# Patient Record
Sex: MALE | Race: WHITE | NOT HISPANIC OR LATINO
[De-identification: names, ages, dates, MRNs, and addresses within clinical notes are randomized per-mention and may not be internally consistent; named-entity substitution may affect disease eponyms.]

---

## 2017-06-26 PROBLEM — Z00.00 ENCOUNTER FOR PREVENTIVE HEALTH EXAMINATION: Status: ACTIVE | Noted: 2017-06-26

## 2017-07-21 ENCOUNTER — APPOINTMENT (OUTPATIENT)
Dept: VASCULAR SURGERY | Facility: CLINIC | Age: 57
End: 2017-07-21

## 2017-10-03 ENCOUNTER — APPOINTMENT (OUTPATIENT)
Dept: VASCULAR SURGERY | Facility: CLINIC | Age: 57
End: 2017-10-03
Payer: MEDICAID

## 2017-10-03 VITALS
BODY MASS INDEX: 37.22 KG/M2 | SYSTOLIC BLOOD PRESSURE: 118 MMHG | WEIGHT: 290 LBS | DIASTOLIC BLOOD PRESSURE: 80 MMHG | HEIGHT: 74 IN

## 2017-10-03 DIAGNOSIS — Z78.9 OTHER SPECIFIED HEALTH STATUS: ICD-10-CM

## 2017-10-03 DIAGNOSIS — Z86.39 PERSONAL HISTORY OF OTHER ENDOCRINE, NUTRITIONAL AND METABOLIC DISEASE: ICD-10-CM

## 2017-10-03 DIAGNOSIS — Z86.79 PERSONAL HISTORY OF OTHER DISEASES OF THE CIRCULATORY SYSTEM: ICD-10-CM

## 2017-10-03 PROCEDURE — 99213 OFFICE O/P EST LOW 20 MIN: CPT

## 2017-10-03 PROCEDURE — 93978 VASCULAR STUDY: CPT

## 2018-02-13 ENCOUNTER — APPOINTMENT (OUTPATIENT)
Dept: VASCULAR SURGERY | Facility: CLINIC | Age: 58
End: 2018-02-13
Payer: MEDICAID

## 2018-02-13 ENCOUNTER — OUTPATIENT (OUTPATIENT)
Dept: OUTPATIENT SERVICES | Facility: HOSPITAL | Age: 58
LOS: 1 days | Discharge: HOME | End: 2018-02-13

## 2018-02-13 DIAGNOSIS — I71.4 ABDOMINAL AORTIC ANEURYSM, WITHOUT RUPTURE: ICD-10-CM

## 2018-02-13 PROCEDURE — 93978 VASCULAR STUDY: CPT

## 2018-02-13 PROCEDURE — 99213 OFFICE O/P EST LOW 20 MIN: CPT

## 2018-02-20 ENCOUNTER — OUTPATIENT (OUTPATIENT)
Dept: OUTPATIENT SERVICES | Facility: HOSPITAL | Age: 58
LOS: 1 days | Discharge: HOME | End: 2018-02-20

## 2018-02-20 DIAGNOSIS — I71.4 ABDOMINAL AORTIC ANEURYSM, WITHOUT RUPTURE: ICD-10-CM

## 2018-02-23 ENCOUNTER — APPOINTMENT (OUTPATIENT)
Dept: VASCULAR SURGERY | Facility: CLINIC | Age: 58
End: 2018-02-23
Payer: MEDICAID

## 2018-02-23 PROCEDURE — 99212 OFFICE O/P EST SF 10 MIN: CPT

## 2018-04-03 ENCOUNTER — APPOINTMENT (OUTPATIENT)
Dept: VASCULAR SURGERY | Facility: CLINIC | Age: 58
End: 2018-04-03

## 2018-08-23 ENCOUNTER — OTHER (OUTPATIENT)
Age: 58
End: 2018-08-23

## 2018-08-24 ENCOUNTER — APPOINTMENT (OUTPATIENT)
Dept: VASCULAR SURGERY | Facility: CLINIC | Age: 58
End: 2018-08-24
Payer: MEDICAID

## 2018-08-24 ENCOUNTER — APPOINTMENT (OUTPATIENT)
Dept: VASCULAR SURGERY | Facility: CLINIC | Age: 58
End: 2018-08-24

## 2018-08-31 ENCOUNTER — APPOINTMENT (OUTPATIENT)
Dept: VASCULAR SURGERY | Facility: CLINIC | Age: 58
End: 2018-08-31
Payer: MEDICAID

## 2018-08-31 PROCEDURE — 99213 OFFICE O/P EST LOW 20 MIN: CPT

## 2018-08-31 PROCEDURE — 93978 VASCULAR STUDY: CPT

## 2018-11-01 ENCOUNTER — OUTPATIENT (OUTPATIENT)
Dept: OUTPATIENT SERVICES | Facility: HOSPITAL | Age: 58
LOS: 1 days | End: 2018-11-01
Payer: MEDICAID

## 2018-11-25 ENCOUNTER — INPATIENT (INPATIENT)
Facility: HOSPITAL | Age: 58
LOS: 1 days | Discharge: HOME | End: 2018-11-27
Attending: INTERNAL MEDICINE | Admitting: INTERNAL MEDICINE

## 2018-11-25 VITALS
HEART RATE: 83 BPM | RESPIRATION RATE: 18 BRPM | SYSTOLIC BLOOD PRESSURE: 193 MMHG | OXYGEN SATURATION: 96 % | TEMPERATURE: 99 F | DIASTOLIC BLOOD PRESSURE: 100 MMHG

## 2018-11-25 LAB
ALBUMIN SERPL ELPH-MCNC: 4 G/DL — SIGNIFICANT CHANGE UP (ref 3.5–5.2)
ALP SERPL-CCNC: 105 U/L — SIGNIFICANT CHANGE UP (ref 30–115)
ALT FLD-CCNC: 30 U/L — SIGNIFICANT CHANGE UP (ref 0–41)
ANION GAP SERPL CALC-SCNC: 16 MMOL/L — HIGH (ref 7–14)
APTT BLD: 33 SEC — SIGNIFICANT CHANGE UP (ref 27–39.2)
AST SERPL-CCNC: 22 U/L — SIGNIFICANT CHANGE UP (ref 0–41)
BASOPHILS # BLD AUTO: 0.07 K/UL — SIGNIFICANT CHANGE UP (ref 0–0.2)
BASOPHILS NFR BLD AUTO: 0.7 % — SIGNIFICANT CHANGE UP (ref 0–1)
BILIRUB SERPL-MCNC: 0.3 MG/DL — SIGNIFICANT CHANGE UP (ref 0.2–1.2)
BUN SERPL-MCNC: 21 MG/DL — HIGH (ref 10–20)
CALCIUM SERPL-MCNC: 10 MG/DL — SIGNIFICANT CHANGE UP (ref 8.5–10.1)
CHLORIDE SERPL-SCNC: 98 MMOL/L — SIGNIFICANT CHANGE UP (ref 98–110)
CO2 SERPL-SCNC: 23 MMOL/L — SIGNIFICANT CHANGE UP (ref 17–32)
CREAT SERPL-MCNC: 1.2 MG/DL — SIGNIFICANT CHANGE UP (ref 0.7–1.5)
EOSINOPHIL # BLD AUTO: 0.31 K/UL — SIGNIFICANT CHANGE UP (ref 0–0.7)
EOSINOPHIL NFR BLD AUTO: 3.3 % — SIGNIFICANT CHANGE UP (ref 0–8)
GLUCOSE BLDC GLUCOMTR-MCNC: 166 MG/DL — HIGH (ref 70–99)
GLUCOSE SERPL-MCNC: 266 MG/DL — HIGH (ref 70–99)
HCT VFR BLD CALC: 46.8 % — SIGNIFICANT CHANGE UP (ref 42–52)
HGB BLD-MCNC: 16.1 G/DL — SIGNIFICANT CHANGE UP (ref 14–18)
IMM GRANULOCYTES NFR BLD AUTO: 0.4 % — HIGH (ref 0.1–0.3)
INR BLD: 0.97 RATIO — SIGNIFICANT CHANGE UP (ref 0.65–1.3)
LYMPHOCYTES # BLD AUTO: 2.49 K/UL — SIGNIFICANT CHANGE UP (ref 1.2–3.4)
LYMPHOCYTES # BLD AUTO: 26.2 % — SIGNIFICANT CHANGE UP (ref 20.5–51.1)
MCHC RBC-ENTMCNC: 30.3 PG — SIGNIFICANT CHANGE UP (ref 27–31)
MCHC RBC-ENTMCNC: 34.4 G/DL — SIGNIFICANT CHANGE UP (ref 32–37)
MCV RBC AUTO: 88.1 FL — SIGNIFICANT CHANGE UP (ref 80–94)
MONOCYTES # BLD AUTO: 0.7 K/UL — HIGH (ref 0.1–0.6)
MONOCYTES NFR BLD AUTO: 7.4 % — SIGNIFICANT CHANGE UP (ref 1.7–9.3)
NEUTROPHILS # BLD AUTO: 5.91 K/UL — SIGNIFICANT CHANGE UP (ref 1.4–6.5)
NEUTROPHILS NFR BLD AUTO: 62 % — SIGNIFICANT CHANGE UP (ref 42.2–75.2)
NRBC # BLD: 0 /100 WBCS — SIGNIFICANT CHANGE UP (ref 0–0)
PLATELET # BLD AUTO: 293 K/UL — SIGNIFICANT CHANGE UP (ref 130–400)
POTASSIUM SERPL-MCNC: 4.7 MMOL/L — SIGNIFICANT CHANGE UP (ref 3.5–5)
POTASSIUM SERPL-SCNC: 4.7 MMOL/L — SIGNIFICANT CHANGE UP (ref 3.5–5)
PROT SERPL-MCNC: 7.3 G/DL — SIGNIFICANT CHANGE UP (ref 6–8)
PROTHROM AB SERPL-ACNC: 11.2 SEC — SIGNIFICANT CHANGE UP (ref 9.95–12.87)
RBC # BLD: 5.31 M/UL — SIGNIFICANT CHANGE UP (ref 4.7–6.1)
RBC # FLD: 12 % — SIGNIFICANT CHANGE UP (ref 11.5–14.5)
SODIUM SERPL-SCNC: 137 MMOL/L — SIGNIFICANT CHANGE UP (ref 135–146)
TROPONIN T SERPL-MCNC: <0.01 NG/ML — SIGNIFICANT CHANGE UP
WBC # BLD: 9.52 K/UL — SIGNIFICANT CHANGE UP (ref 4.8–10.8)
WBC # FLD AUTO: 9.52 K/UL — SIGNIFICANT CHANGE UP (ref 4.8–10.8)

## 2018-11-25 RX ORDER — PANTOPRAZOLE SODIUM 20 MG/1
40 TABLET, DELAYED RELEASE ORAL
Qty: 0 | Refills: 0 | Status: DISCONTINUED | OUTPATIENT
Start: 2018-11-25 | End: 2018-11-27

## 2018-11-25 RX ORDER — SODIUM CHLORIDE 9 MG/ML
1000 INJECTION, SOLUTION INTRAVENOUS ONCE
Qty: 0 | Refills: 0 | Status: COMPLETED | OUTPATIENT
Start: 2018-11-25 | End: 2018-11-25

## 2018-11-25 RX ORDER — ATORVASTATIN CALCIUM 80 MG/1
80 TABLET, FILM COATED ORAL AT BEDTIME
Qty: 0 | Refills: 0 | Status: DISCONTINUED | OUTPATIENT
Start: 2018-11-25 | End: 2018-11-27

## 2018-11-25 RX ORDER — ASPIRIN/CALCIUM CARB/MAGNESIUM 324 MG
325 TABLET ORAL DAILY
Qty: 0 | Refills: 0 | Status: DISCONTINUED | OUTPATIENT
Start: 2018-11-25 | End: 2018-11-27

## 2018-11-25 RX ORDER — CLOPIDOGREL BISULFATE 75 MG/1
75 TABLET, FILM COATED ORAL DAILY
Qty: 0 | Refills: 0 | Status: DISCONTINUED | OUTPATIENT
Start: 2018-11-25 | End: 2018-11-27

## 2018-11-25 RX ORDER — ENOXAPARIN SODIUM 100 MG/ML
40 INJECTION SUBCUTANEOUS DAILY
Qty: 0 | Refills: 0 | Status: DISCONTINUED | OUTPATIENT
Start: 2018-11-25 | End: 2018-11-27

## 2018-11-25 RX ORDER — SODIUM CHLORIDE 9 MG/ML
1000 INJECTION INTRAMUSCULAR; INTRAVENOUS; SUBCUTANEOUS ONCE
Qty: 0 | Refills: 0 | Status: COMPLETED | OUTPATIENT
Start: 2018-11-25 | End: 2018-11-25

## 2018-11-25 RX ORDER — CLOPIDOGREL BISULFATE 75 MG/1
300 TABLET, FILM COATED ORAL ONCE
Qty: 0 | Refills: 0 | Status: COMPLETED | OUTPATIENT
Start: 2018-11-25 | End: 2018-11-25

## 2018-11-25 RX ORDER — LEVOTHYROXINE SODIUM 125 MCG
125 TABLET ORAL DAILY
Qty: 0 | Refills: 0 | Status: DISCONTINUED | OUTPATIENT
Start: 2018-11-25 | End: 2018-11-27

## 2018-11-25 RX ORDER — DILTIAZEM HCL 120 MG
360 CAPSULE, EXT RELEASE 24 HR ORAL DAILY
Qty: 0 | Refills: 0 | Status: DISCONTINUED | OUTPATIENT
Start: 2018-11-25 | End: 2018-11-27

## 2018-11-25 RX ORDER — ASPIRIN/CALCIUM CARB/MAGNESIUM 324 MG
325 TABLET ORAL ONCE
Qty: 0 | Refills: 0 | Status: COMPLETED | OUTPATIENT
Start: 2018-11-25 | End: 2018-11-25

## 2018-11-25 RX ORDER — LISINOPRIL 2.5 MG/1
40 TABLET ORAL DAILY
Qty: 0 | Refills: 0 | Status: DISCONTINUED | OUTPATIENT
Start: 2018-11-25 | End: 2018-11-27

## 2018-11-25 RX ADMIN — CLOPIDOGREL BISULFATE 300 MILLIGRAM(S): 75 TABLET, FILM COATED ORAL at 19:52

## 2018-11-25 RX ADMIN — SODIUM CHLORIDE 500 MILLILITER(S): 9 INJECTION, SOLUTION INTRAVENOUS at 20:00

## 2018-11-25 RX ADMIN — ENOXAPARIN SODIUM 40 MILLIGRAM(S): 100 INJECTION SUBCUTANEOUS at 23:00

## 2018-11-25 RX ADMIN — SODIUM CHLORIDE 2000 MILLILITER(S): 9 INJECTION INTRAMUSCULAR; INTRAVENOUS; SUBCUTANEOUS at 14:18

## 2018-11-25 RX ADMIN — SODIUM CHLORIDE 1000 MILLILITER(S): 9 INJECTION INTRAMUSCULAR; INTRAVENOUS; SUBCUTANEOUS at 15:18

## 2018-11-25 RX ADMIN — SODIUM CHLORIDE 1000 MILLILITER(S): 9 INJECTION, SOLUTION INTRAVENOUS at 20:44

## 2018-11-25 RX ADMIN — Medication 162 MILLIGRAM(S): at 20:00

## 2018-11-25 RX ADMIN — ATORVASTATIN CALCIUM 80 MILLIGRAM(S): 80 TABLET, FILM COATED ORAL at 20:44

## 2018-11-25 NOTE — CONSULT NOTE ADULT - SUBJECTIVE AND OBJECTIVE BOX
CC: Mid basilar artery stenosis and TIA    HPI:  59 y/o right handed male with PMHX of DM II, HTN, hyperlipidemia, abdominal aortic aneurysm last measuring 4.7 2 months ago, RA not on any meds, hypothyroidism presents to ED with 1 week of several transient episodes of right sided weakness and slurred speech. Patient states that he is otherwise a health and active and as of 1 week ago he woke up from his sleep experiencing burning sensation under his eyes then he stood up to go to the bathroom but his right leg was weak and he was having difficulty walking to the bathroom. His symptoms lasted for approx. 5 minute duration with complete resolution. In the following days he had multiple episodes of right sided weakness , slurred speech and blurry vision with varying intensity   reports symptoms first began ~ a week ago, he develops a burning under his eyes, right sided facial numbness, right UE & LE complete paralysis, and slurred speech. This has happened 3 times over the course of the week, lasting one a few minutes. The third time occurred last night, he states symptoms lasted less than 5 minutes while he was sleeping and got up from sleep. He did not come to the hospital as there was no one to take care of his kids and the  came today.     Denies fever, chills, cough, abdominal pain, n/v/d, chest pain, numbness, tingling. Denies blurry vision or visual changes. Pt does not have symptoms at this time while present in the ED and NIHSS scale is 0. Patient's BP was 196/100 HR 90s, was given 1L NS and 1L RL. Patient was also given Plavix 300mg,  with Lipitor 80. (25 Nov 2018 19:40)            Neuro Exam:  Orientation: oriented to person, oriented to place and oriented to time.   Attention: normal concentrating ability and visual attention was not decreased.   Language: no difficulty naming common objects, no difficulty repeating a phrase, no difficulty writing a sentence, fluency intact, comprehension intact and reading intact.   Fund of knowledge: displays adequate knowledge of personal past history.   Cranial Nerves: visual acuity intact bilaterally, visual fields full to confrontation, pupils equal round and reactive to light, extraocular motion intact, facial sensation intact symmetrically, face symmetrical, hearing was intact bilaterally, tongue and palate midline, head turning and shoulder shrug symmetric and there was no tongue deviation with protrusion.   Motor: muscle tone was normal in all four extremities, muscle strength was normal in all four extremities and normal bulk in all four extremities.   Sensory exam: light touch was intact.   Coordination:. normal gait. balance was intact. there was no past-pointing. no tremor present.   Deep tendon reflexes:   Biceps right 2+. Biceps left 2+.    Triceps right 2+. Triceps left 2+.  LOC  Brachioradialis right 2+. Brachioradialis left 2+.    Patella right 2+. Patella left 2+.    Ankle jerk right 2+. Ankle jerk left 2+.   Plantar responses normal on the right, normal on the left.      NIHSS:     Allergies    penicillin (Unknown)    Intolerances      MEDICATIONS  (STANDING):  aspirin  chewable 325 milliGRAM(s) Oral daily  atorvastatin 80 milliGRAM(s) Oral at bedtime  atorvastatin 80 milliGRAM(s) Oral at bedtime  clopidogrel Tablet 75 milliGRAM(s) Oral daily  diltiazem    milliGRAM(s) Oral daily  enoxaparin Injectable 40 milliGRAM(s) SubCutaneous daily  levothyroxine 125 MICROGram(s) Oral daily  lisinopril 40 milliGRAM(s) Oral daily  pantoprazole    Tablet 40 milliGRAM(s) Oral before breakfast    MEDICATIONS  (PRN):      LABS:                        16.1   9.52  )-----------( 293      ( 25 Nov 2018 13:35 )             46.8     11-25    137  |  98  |  21<H>  ----------------------------<  266<H>  4.7   |  23  |  1.2    Ca    10.0      25 Nov 2018 13:35    TPro  7.3  /  Alb  4.0  /  TBili  0.3  /  DBili  x   /  AST  22  /  ALT  30  /  AlkPhos  105  11-25    PT/INR - ( 25 Nov 2018 13:35 )   PT: 11.20 sec;   INR: 0.97 ratio         PTT - ( 25 Nov 2018 13:35 )  PTT:33.0 sec        Neuro Imaging:    EEG:     Echo:   Carotid Doppler: N/A  Telemetry: CC: Mid basilar artery stenosis and TIA    HPI:  57 y/o right handed male with PMHX of DM II, HTN, hyperlipidemia, abdominal aortic aneurysm last measuring 4.7 2 months ago, RA not on any meds, hypothyroidism presents to ED with 1 week of several transient episodes of right sided weakness and slurred speech. Patient states that he is otherwise a health and active and as of 1 week ago he woke up from his sleep experiencing burning sensation under his eyes then he stood up to go to the bathroom but his right leg was weak and he was having difficulty walking to the bathroom. His symptoms lasted for approx. 5 minute duration with complete resolution. In the following days he had multiple episodes of right sided weakness , slurred speech and blurry vision with varying intensity and duration worst being last night.  He did not come to the hospital as he is a single parent and there was no one to take care of his kids and the  came today. Denies fever, chills, cough, abdominal pain, n/v/d, chest pain, numbness, tingling. Pt does not have symptoms at this time in the ED and NIHSS scale is 0. Patient's BP was 196/100 HR 90s. Patient was also given Plavix 300mg,  with Lipitor 80.       Home Medications:  aspirin 81 mg oral tablet, chewable: 1 tab(s) orally once a day   dilTIAZem 360 mg/24 hours oral tablet, extended release: 1 tab(s) orally once a day  levothyroxine 125 mcg (0.125 mg) oral capsule: 1 cap(s) orally once a day   metFORMIN 500 mg oral tablet: 1 tab(s) orally 3 times a day   ramipril 10 mg oral capsule: 1 cap(s) orally once a day     Social history  one and a half ppd x 30 +yrs   denies alcohol and drug use    Family history  Denies significant family history in 1st degree relatives    Neuro Exam:  Orientation: oriented to person, place time and situation, speech fluent , normal naming and repeats, normal comprehension, able to give history of events.  Cranial Nerves: EOMI, VFF, Speech and language intact without dysarthria or aphasia, face is symmetric, facial sensation is intact, tongue protrudes midline , normal shoulder shrug  Motor:  Normal muscle bulk and tone              5/5 throughout / no drift              No abnormal involuntary mob\vements  Sensory exam: lntact and symmetric  Coordination:. no dysmetria or limb ataxia , ROM intact   gait: deferred     NIHSS: 0    Allergies    penicillin (Unknown)    Intolerances      MEDICATIONS  (STANDING):  aspirin  chewable 325 milliGRAM(s) Oral daily  atorvastatin 80 milliGRAM(s) Oral at bedtime  atorvastatin 80 milliGRAM(s) Oral at bedtime  clopidogrel Tablet 75 milliGRAM(s) Oral daily  diltiazem    milliGRAM(s) Oral daily  enoxaparin Injectable 40 milliGRAM(s) SubCutaneous daily  levothyroxine 125 MICROGram(s) Oral daily  lisinopril 40 milliGRAM(s) Oral daily  pantoprazole    Tablet 40 milliGRAM(s) Oral before breakfast    MEDICATIONS  (PRN):      LABS:                        16.1   9.52  )-----------( 293      ( 25 Nov 2018 13:35 )             46.8     11-25    137  |  98  |  21<H>  ----------------------------<  266<H>  4.7   |  23  |  1.2    Ca    10.0      25 Nov 2018 13:35    TPro  7.3  /  Alb  4.0  /  TBili  0.3  /  DBili  x   /  AST  22  /  ALT  30  /  AlkPhos  105  11-25    PT/INR - ( 25 Nov 2018 13:35 )   PT: 11.20 sec;   INR: 0.97 ratio         PTT - ( 25 Nov 2018 13:35 )  PTT:33.0 sec        Neuro Imaging:  < from: CT Head No Cont (11.25.18 @ 17:00) >  NTERPRETATION:  Clinical History / Reason for exam: Slurred speech  The study was performed without IV contrast.  No previous exam.  The cisterns and ventricles appear normal with no shift of midline   structures.  No hemorrhage, infarct or mass formation is seen..  There is several low density areas in the right left central white matter   probably representing chronic ischemic disease.  The skull is intact.  Impression  No acute process seen.    < end of copied text >      < from: CT Angio Neck w/ IV Cont (11.25.18 @ 17:00) >  FINDINGS:    CTA neck:   The visualized aortic arch and great vessel origins demonstrate small   amount of calcific plaque without significant stenosis.    The right common, internal and external carotid arteries are patent.    The left common carotid artery is patent. There is atheromatous plaque at   the left carotid bifurcation without significant stenosis.    The vertebral arteries are patent bilaterally, dominant on the left.    CTA head:   There is calcific plaque at the carotid siphons with mild stenosis on the   left. There is contour irregularity of the left cavernous ICA likely due   to atherosclerosis.    The anterior and middle cerebral arteries are patent.    The nondominant right vertebral artery terminates as the PICA, normal   variant. The left vertebral artery is patent.    Basilar artery demonstrates a short segment critical (greater than 90%)   stenosis on the basis of noncalcified filling defect. There is   desiccation of the distal basilar artery and posterior cerebral arteries.   There is a prominent right posterior communicating artery. The left   posterior communicating artery is not visualized.    The posterior cerebral arteries are patent with mild stenoses bilaterally.    Other:   Mild scattered paranasal sinus mucosal disease.  Moderate cervical spine degenerative changes. Partial fusion across C2-3   there is likely congenital.  Emphysematous changes at the lung apices.     < end of copied text >    EEG:     Echo:   Carotid Doppler: N/A  Telemetry:

## 2018-11-25 NOTE — CONSULT NOTE ADULT - ASSESSMENT
57 y/o right handed male with PMHX of DM II, HTN, hyperlipidemia, abdominal aortic aneurysm last measuring 4.7 2 months ago, RA not on any meds, hypothyroidism presents to ED with 1 week of several transient episodes of right sided weakness , slurred speech and blurry vision , he had multiple episodes with varying intensity and duration worst being last night. Noted to have Basilar artery  short segment critical (greater than 90%) stenosis on the basis of noncalcified filling defect. Case was discussed with neuro interventionalist Dr Nilda Koenig , who recommends no acute neuro intervention at this time. Case was also discussed with neuro attending on call Dr Lani Espinoza. Patient was also given Plavix 300mg,  and  Lipitor 80 while in ED                       Basilar artery stenosis (>90%)    Plan  Admit to ICU  Continuos cardiac monitoring  Neuro checks q 1 hrs with nihss  keep sbp between 130-160  hydration  Bedrest with Hob no greater than 30 degrees  start  mg q daily  start Plavix 75mg q daily  start Lipitor 80mg q daily  check lipid profile and hbaic  For acute onset symptoms please call a stroke code   Neuro attending note will follow 57 y/o right handed male with PMHX of DM II, HTN, hyperlipidemia, abdominal aortic aneurysm last measuring 4.7 2 months ago, RA not on any meds, hypothyroidism presents to ED with 1 week of several transient episodes of right sided weakness , slurred speech and blurry vision , he had multiple episodes with varying intensity and duration worst being last night. Noted to have Basilar artery  short segment critical (greater than 90%) stenosis on the basis of noncalcified filling defect. Case was discussed with neuro interventionalist Dr Nilda Koenig , who recommends no acute neuro intervention at this time. Case was also discussed with neuro attending on call Dr Lani Espinoza. Patient was also given Plavix 300mg,  and  Lipitor 80 while in ED                       Basilar artery stenosis (>90%)    Plan  Admit to ICU  Continuos cardiac monitoring  Neuro checks q 1 hrs with nihss  keep sbp between 130-160  Avoid hypotension, give saline boluses to maintain bp goal   hydration  Bedrest with Hob no greater than 30 degrees  start  mg q daily  start Plavix 75mg q daily  start Lipitor 80mg q daily  check lipid profile and hbaic  For acute onset symptoms please call a stroke code   Neuro attending note will follow 59 y/o right handed male with PMHX of DM II, HTN, hyperlipidemia, abdominal aortic aneurysm last measuring 4.7 2 months ago, RA not on any meds, hypothyroidism presents to ED with 1 week of several transient episodes of right sided weakness , slurred speech and blurry vision , he had multiple episodes with varying intensity and duration worst being last night. Noted to have Basilar artery  short segment critical (greater than 90%) stenosis on the basis of noncalcified filling defect. Case was discussed with neuro interventionalist Dr Nilda Koenig , who recommends no acute neuro intervention at this time. Case was also discussed with neuro attending on call Dr Lani Espinoza. Patient was also given Plavix 300mg,  and  Lipitor 80 while in ED                       Basilar artery stenosis (>90%)    Plan  Admit to ICU  Continuos cardiac monitoring  Neuro checks q 1 hrs with nihss  keep sbp between 130-160  Avoid hypotension, give saline boluses to maintain bp goal   hydration  Bedrest with Hob no greater than 30 degrees  start  mg q daily  start Plavix 75mg q daily  start Lipitor 80mg q daily  check lipid profile and hbaic  N/c mri brain when stable  For acute onset symptoms please call a stroke code   Neuro attending note will follow 59 y/o right handed male with PMHX of DM II, HTN, hyperlipidemia, abdominal aortic aneurysm last measuring 4.7 2 months ago, RA not on any meds, hypothyroidism presents to ED with 1 week of several transient episodes of right sided weakness , slurred speech and blurry vision , he had multiple episodes with varying intensity and duration worst being last night. Noted to have Basilar artery  short segment critical (greater than 90%) stenosis on the basis of noncalcified filling defect. Case was discussed with neuro interventionalist Dr Nilda Koenig , who recommends no acute neuro intervention at this time will do medical management with frequent neuro checks and ICU monitoring  for now. Case was also discussed with neuro attending on call Dr Lani Espinoza. Patient was also given Plavix 300mg,  and  Lipitor 80 while in ED                       Basilar artery stenosis (>90%)    Plan  Admit to ICU  Continuos cardiac monitoring  Neuro checks q 1 hrs with nihss  keep sbp between 130-160  Avoid hypotension, give saline boluses to maintain bp goal   hydration  Bedrest with Hob no greater than 30 degrees  start  mg q daily  start Plavix 75mg q daily  start Lipitor 80mg q daily  check lipid profile and hbaic  N/c mri brain when stable  For acute onset symptoms please call a stroke code   Neuro attending note will follow

## 2018-11-25 NOTE — CONSULT NOTE ADULT - ATTENDING COMMENTS
Patient seen and examined this AM and history reviewed with patient.  Additionally overnight patient had transient episode of diplopia lasting <30 seconds.  Currently asymptomatic     Exam  A+OX3, language and attention normal  CN 2-12 Normal  power 5/5 with no drift  Sensory symmetric to PP, Temp, Vib with no neglect  FTN NL  Gait normal    NIHSS 0  mRankin 0 (baseline and current)    A/P  Patient with multiple TIA's over last 1-2 weeks and new finding of mid basilar severe stenosis.  Patient recently quit smoking cigarettes when symptoms began.  Also increased his aspirin form one baby aspirin to 2 baby aspirins a day.  Will need risk factor modification not an interventional candidate  1. ASA and plavix 75mg QD  2. High intensity statin therapy  3. Diet and weight modification and reduction  4. Check Hgba1c  5. BP control 130-150 goal (permissive hypertension)  6. Neurochecks q4 hours  7. 2DECHO  8. Platelet function assay for aspirin (can be as an out patient if not available as in patient)

## 2018-11-25 NOTE — H&P ADULT - PMH
Aortic aneurysm    Diabetes    HTN (hypertension)    Rheumatoid arthritis    Skull fracture    Thyroid disease

## 2018-11-25 NOTE — ED PROVIDER NOTE - OBJECTIVE STATEMENT
Pt is a 59 y/o Male, PMHX of DM, htn, hyperlipidemia, abdominal aortic aneurysm last measuring 4.7 2 months ago, presents to ED with slurred speech & weakness. Pt reports symptoms first began ~ a week ago, he develops a burning under his eyes, right sided facial numbness, right UE & LE weakness, and slurred speech. This has happened 3 times over the course of the week, lasting one a few minutes. The third time occurred last night, he states symptoms lasted less than 10 minutes. He does reports + family hx of strokes. Pt states he was > 1 pack per day smoker, but quit last week at start of symptoms and has not had cigarette since. Denies fever, chills, cough, abdominal pain, n/v/d, chest pain, numbness, tingling. Denies blurry vision or visual changes. Pt does not have symptoms at this time while present in the ED.

## 2018-11-25 NOTE — ED PROVIDER NOTE - PRINCIPAL DIAGNOSIS
----- Message from Penny Rahman sent at 6/5/2017  8:18 AM CDT -----  Contact: pt  Pt need a refill on his Crestor 20 mg and Coreg 12.5 mg and he would like a call once this is done because he is out.  LOV 6/21/16 Dr. Gordon    Thanks    Basilar artery stenosis

## 2018-11-25 NOTE — ED PROVIDER NOTE - CARE PLAN
This is a 26year old       who presents today for her 6 week postpartum exam.  She gave birth to a 7 lb 15 oz male named Louis Earing on 2017. Her pregnancy was uncomplicated  She had . This birth was uncomplicated. She is currently bottlefeeding . Her bleeding stopped after 3 weeks then started again last week. On 7/3 she called office reporting saturating pad per hour and passing large clots. She was advised by triage to have CBC checked but she did not have blood work done. She states bleeding is now just brown spotting. She has been afebrile. She has not experienced symptoms of postpartum depression. She has had intercourse since delivery. She plans to use oral contraceptive pill for birth control. Polyarthritis                                                 Anemia                                                        JRA (juvenile rheumatoid arthritis) (CMS/[facility])                   Comment: Per patient    Excessive anger                                                 Comment: Has many physical alterations stemming from her               anger. Uterine fibroid                                               Generalized anxiety disorder                                  History of chickenpox                                         Depression during pregnancy                                     Comment: zoloft in last pregnancy    Genital herpes                                                [place]  Depression Scale  Please check the answer that best describes how you have felt over the past 7 days. 1. Have you been able to laugh and see the funny side of things: (0)  As much as I always could  2. I have looked with enjoyment to things: (0)  As much as I ever did  3. I have blamed myself unnecessarily when things went wrong: (2)  Yes, sometimes  4. I have been anxious or worried for no good reason: (2)  Yes, sometimes  5.  I have felt scared or panicky for no good reason: "(0)  No, not at all  6. Things have been getting on top of me (overwhelming): (0)  No, I have been coping as well as ever  7. I have been so unhappy that I have difficulty sleeping: (1)  Sometimes  8. I have felt sad or miserable: (1)  Hardly ever  9. I have been so unhappy that I have been crying: (0)  No, not at all  10. The thought of harming myself has occurred to me: (0)  No, not at all    Total: 6  Any score>10 needs follow up for possible postpartum depression    PHYSICAL EXAM:  Visit Vitals  /62   Ht 5' 4"" (1.626 m)   Wt 67.2 kg   LMP 08/16/2016 (Exact Date)   Breastfeeding? No   BMI 25.44 kg/mÂ²     Thyroid - normal to inspection and palpation  Heart - regular rate and rhythm, no murmurs and no clicks or gallops  Lungs - clear to auscultation bilaterally  Breasts - normal  Abdomen - soft, non-tender  Perineum - healed  Vulva - normal   Vagina - Normal mucosa, no unusual discharge no blood  Cervix - pink  Uterus - well-involuted firm small  Adnexa - nontender, normal size    Impression:  1. Normal 6 week postpartum exam, well-involuted, normal post-partum course    2. No contraindications to OCP    Plan:  1. Pap done    2. Reviewed indications of oral contraceptive pill. Rx Microgestin FE 1.5/30 x 3 mo sent to preferred pharmacy    3. Draw CBC today    4.  Return in 3 months for pill check follow up  " Principal Discharge DX:	Basilar artery stenosis  Secondary Diagnosis:	Weakness  Secondary Diagnosis:	Numbness

## 2018-11-25 NOTE — ED PROVIDER NOTE - MEDICAL DECISION MAKING DETAILS
Patient to be admitted to an intensive care unit. Case discussed with on-call pulmonologist. Care endorsed to ICU resident.

## 2018-11-25 NOTE — ED PROVIDER NOTE - PHYSICAL EXAMINATION
VITAL SIGNS: I have reviewed the initial vital signs.   CONSTITUTIONAL: Awake, alert. Well-developed; well-nourished; in no distress. Non-toxic appearing.   SKIN: No rash, vesicles/lesion, abrasions or lacerations. No ecchymosis or signs of trauma.  HEAD: Normocephalic; atraumatic. No step offs or tenderness.   EYES: Symmetrical, no discharge or signs of trauma. Conjunctiva and sclera clear. PERRL, EOM intact with no nystagmus or pain.   ENT: Airway patent. MMM.   NECK: Supple; non-tender.   CARD: No chest wall deformity or tenderness. S1, S2 normal; no murmurs, gallops, or rubs. Regular rate and rhythm.  RESP: Good air movement. Lungs CTAB. No crackles, wheezes, rales or rhonchi.  ABD: Soft; non-distended; non-tender.   EXT: No bony deformity or tenderness. Normal ROM x 4 extremities.   NEURO: A&Ox3. GCS 15. Normal speech. CN 2-12 intact. Strength 5/5 UE/LE b/l. No sensory deficits. n/v intact UE/LE b/l, pulses symmetrical. Normal gait.

## 2018-11-25 NOTE — H&P ADULT - NSHPPHYSICALEXAM_GEN_ALL_CORE
PHYSICAL EXAM:      Constitutional: obese male with NAD    Respiratory: lungs B/L clear on auscultation    Cardiovascular: S1S2 normal, no murmur heard    Gastrointestinal: Abd soft, non distended, non tender, BS+    Extremities: No pedal edema    Neurological: AAOX3, No FND

## 2018-11-25 NOTE — ED PROVIDER NOTE - PROGRESS NOTE DETAILS
Spoke with Neurointerventionalist Dr. Grier, regarding findings on CT. Explained to me that the best step at this time is medical management. Requesting dual antiplatelet therapy with specifically 325mg ASA PO daily, Plavix 300mg loading with ongoing dosing, IV fluids, maintaining -160 mmHg, and Q1Hour neuro checks - admission to ICU - should symptoms occur again. I discussed the findings extensively with the patient. I spoke with the ICU fellow and resident regarding the admission. Did not get call back from neuro, spoke w/ neuro NP, aware of pt, will come to ER and evaluate.

## 2018-11-25 NOTE — ED ADULT TRIAGE NOTE - CHIEF COMPLAINT QUOTE
pt states "I had a stroke last night" pt reports extreme slurred speech for approximately ten minutes pt also reports right sided weakness. pt reports 3 episodes x 10 days

## 2018-11-25 NOTE — H&P ADULT - ASSESSMENT
IMPRESSION:    Basilar artery stenosis  TIA  HTN      PLAN:    CNS: Keep head at 45.  Send HgA1c, lipid profile  Neuro checks q1hr.  SBP goal 130-160  Start on ASA 325mg daily, Plavix 75mg daily, Lipitor 80mg HS.  If patient has stroke symptoms please called stroke code and Dr. Constantino should be informed.  Neurointerventional Consult for Dr. Constantino (above recommendations are from him)      HEENT:  Oral care    PULMONARY:  HOB @ 45 degrees    CARDIOVASCULAR:  HTN: c/w lisinopril, Cardizem.    GI: GI prophylaxis with protonix                                     Feeding: NPO    RENAL:  F/u  lytes.  Correct as needed. accurate I/O    INFECTIOUS DISEASE: No ABx    HEMATOLOGICAL:  DVT prophylaxis with lovenox    ENDOCRINE:  Follow up FS.  Insulin protocol if needed.  c/w levothyroxine, get TSH  Get HgA1c, lipid profile.    MUSCULOSKELETAL: c/o neck pain, get XR neck as H/O RA.    CODE STATUS: FULL CODE    DISPOSITION: Pt requires continued monitoring in the MICU

## 2018-11-25 NOTE — ED ADULT NURSE NOTE - OBJECTIVE STATEMENT
Patient complains of stroke like symptoms including right sided weakness and slurred speech last night; states this has happened several times over the last few weeks.

## 2018-11-25 NOTE — H&P ADULT - HISTORY OF PRESENT ILLNESS
59 y/o Male, coming from home  PMHX of DM II, HTN, hyperlipidemia, abdominal aortic aneurysm last measuring 4.7 2 months ago, RA not on any meds, hypothyroidism presents to ED with H/O  slurred speech & R sided weakness. Pt reports symptoms first began ~ a week ago, he develops a burning under his eyes, right sided facial numbness, right UE & LE complete paralysis, and slurred speech. This has happened 3 times over the course of the week, lasting one a few minutes. The third time occurred last night, he states symptoms lasted less than 5 minutes while he was sleeping and got up from sleep. He did not come to the hospital as there was no one to take care of his kids and the  came today.     Denies fever, chills, cough, abdominal pain, n/v/d, chest pain, numbness, tingling. Denies blurry vision or visual changes. Pt does not have symptoms at this time while present in the ED and NIHSS scale is 0. Patient's BP was 196/100 HR 90s, was given 1L NS and 1L RL. Patient was also given Plavix 300mg,  with Lipitor 80.

## 2018-11-25 NOTE — ED PROVIDER NOTE - ATTENDING CONTRIBUTION TO CARE
58 y M PMH DM, HTN, smoker 1+ ppd until 1 wk ago at onset of current symptoms, AAA monitored closely by Dr. Raygoza 4.7cm on latest measurement, no proximal aortic pathology on the same studies, pw right facial, right upper and right lower ext numbness lasting 90 seconds to 15 minutes, 3 episodes over the past 1 wk. Also having sound of rushing with each beat of his pulse heard in his right ear over the same period, and b/l neck pain in posterior aspect occurring only with twisting and bending his neck. Last episode of numbness was last night. FHx of massive stroke and brain death in his father at a young age.   Exam: NAD, NCAT, HEENT: mmm, EOMI, PERRLA, Ears w bl nl TMs and external canals. OP clear. Neck: supple, nontender, nl ROM, no auscultated bruit. Heart: RRR, no murmur, 2+ b/l DP and radial pulses intact. Lungs: BCTA, no signs of increased WOB, Abd: NTND, no guarding or rebound, no hernia palpated, no CVAT. No palpated pulsatile mass. MSK: chest, back, and ext nontender, nl rom, no deformity. Neuro: A&Ox3, CN II-XII intact, normal strength 5/5 all 4 ext, nl sensation throughout, normal speech, gait, and coordination.   A/P: Eval TIAs, concern for cervical vessel disease. EKG, labs, imaging, cardiac monitor, reassess.

## 2018-11-25 NOTE — H&P ADULT - NSHPLABSRESULTS_GEN_ALL_CORE
16.1   9.52  )-----------( 293      ( 25 Nov 2018 13:35 )             46.8       11-25    137  |  98  |  21<H>  ----------------------------<  266<H>  4.7   |  23  |  1.2    Ca    10.0      25 Nov 2018 13:35    TPro  7.3  /  Alb  4.0  /  TBili  0.3  /  DBili  x   /  AST  22  /  ALT  30  /  AlkPhos  105  11-25                  PT/INR - ( 25 Nov 2018 13:35 )   PT: 11.20 sec;   INR: 0.97 ratio         PTT - ( 25 Nov 2018 13:35 )  PTT:33.0 sec    Lactate Trend      CARDIAC MARKERS ( 25 Nov 2018 13:35 )  x     / <0.01 ng/mL / x     / x     / x

## 2018-11-25 NOTE — H&P ADULT - ATTENDING COMMENTS
Patient seen and examined independently. Agree with resident note/ history / physical exam and plan of care with following exceptions/additions/updates. Case discussed with house-staff, nursing and patient/pt decision maker.     pt was seen by neurology and cleared for dc home today. has no residual symptoms, wants to go home. ok with neuro, ( spoke with Dr Chang)   pt is ambulating independently. has no slurred speech and no weakness.     advised, hydration, asa, plavix, Glc and BP control, fu with neurology   will dc home,

## 2018-11-25 NOTE — ED PROVIDER NOTE - NS ED ROS FT
Except as documented in HPI, all other ROS negative.   GENERAL: Denies fever/chills, loss of appetite/weight or fatigue.  SKIN: Denies rashes, abrasions, lacerations, ecchymosis, erythema, or edema.  HEAD: Denies headache, dizziness or trauma.  EYES: Denies blurry vision, diplopia, or photophobia.  CARDIAC: Denies chest pain, palpitations, or SOB.   RESPIRATORY: Denies SOB, cough, hemoptysis or wheezing.   GI: Denies abdominal pain, n/v/d.   MSK: Denies myalgias, bony deformity or pain.   NEURO: + slurred speech. + RUE & RLE weakness. + facial numbness.

## 2018-11-26 LAB
ANION GAP SERPL CALC-SCNC: 17 MMOL/L — HIGH (ref 7–14)
BASOPHILS # BLD AUTO: 0.04 K/UL — SIGNIFICANT CHANGE UP (ref 0–0.2)
BASOPHILS NFR BLD AUTO: 0.4 % — SIGNIFICANT CHANGE UP (ref 0–1)
BUN SERPL-MCNC: 17 MG/DL — SIGNIFICANT CHANGE UP (ref 10–20)
CALCIUM SERPL-MCNC: 9.4 MG/DL — SIGNIFICANT CHANGE UP (ref 8.5–10.1)
CHLORIDE SERPL-SCNC: 95 MMOL/L — LOW (ref 98–110)
CHOLEST SERPL-MCNC: 191 MG/DL — SIGNIFICANT CHANGE UP (ref 100–200)
CO2 SERPL-SCNC: 24 MMOL/L — SIGNIFICANT CHANGE UP (ref 17–32)
CREAT SERPL-MCNC: 1.1 MG/DL — SIGNIFICANT CHANGE UP (ref 0.7–1.5)
EOSINOPHIL # BLD AUTO: 0.33 K/UL — SIGNIFICANT CHANGE UP (ref 0–0.7)
EOSINOPHIL NFR BLD AUTO: 3.7 % — SIGNIFICANT CHANGE UP (ref 0–8)
ESTIMATED AVERAGE GLUCOSE: 214 MG/DL — HIGH (ref 68–114)
GLUCOSE SERPL-MCNC: 235 MG/DL — HIGH (ref 70–99)
HBA1C BLD-MCNC: 9.1 % — HIGH (ref 4–5.6)
HCT VFR BLD CALC: 43.8 % — SIGNIFICANT CHANGE UP (ref 42–52)
HDLC SERPL-MCNC: 25 MG/DL — LOW
HGB BLD-MCNC: 15 G/DL — SIGNIFICANT CHANGE UP (ref 14–18)
IMM GRANULOCYTES NFR BLD AUTO: 0.6 % — HIGH (ref 0.1–0.3)
LIPID PNL WITH DIRECT LDL SERPL: 119 MG/DL — SIGNIFICANT CHANGE UP (ref 4–129)
LYMPHOCYTES # BLD AUTO: 2.18 K/UL — SIGNIFICANT CHANGE UP (ref 1.2–3.4)
LYMPHOCYTES # BLD AUTO: 24.3 % — SIGNIFICANT CHANGE UP (ref 20.5–51.1)
MAGNESIUM SERPL-MCNC: 2 MG/DL — SIGNIFICANT CHANGE UP (ref 1.8–2.4)
MCHC RBC-ENTMCNC: 30.4 PG — SIGNIFICANT CHANGE UP (ref 27–31)
MCHC RBC-ENTMCNC: 34.2 G/DL — SIGNIFICANT CHANGE UP (ref 32–37)
MCV RBC AUTO: 88.8 FL — SIGNIFICANT CHANGE UP (ref 80–94)
MONOCYTES # BLD AUTO: 0.74 K/UL — HIGH (ref 0.1–0.6)
MONOCYTES NFR BLD AUTO: 8.2 % — SIGNIFICANT CHANGE UP (ref 1.7–9.3)
NEUTROPHILS # BLD AUTO: 5.63 K/UL — SIGNIFICANT CHANGE UP (ref 1.4–6.5)
NEUTROPHILS NFR BLD AUTO: 62.8 % — SIGNIFICANT CHANGE UP (ref 42.2–75.2)
PHOSPHATE SERPL-MCNC: 2.7 MG/DL — SIGNIFICANT CHANGE UP (ref 2.1–4.9)
PLATELET # BLD AUTO: 278 K/UL — SIGNIFICANT CHANGE UP (ref 130–400)
POTASSIUM SERPL-MCNC: 4.4 MMOL/L — SIGNIFICANT CHANGE UP (ref 3.5–5)
POTASSIUM SERPL-SCNC: 4.4 MMOL/L — SIGNIFICANT CHANGE UP (ref 3.5–5)
RBC # BLD: 4.93 M/UL — SIGNIFICANT CHANGE UP (ref 4.7–6.1)
RBC # FLD: 12.2 % — SIGNIFICANT CHANGE UP (ref 11.5–14.5)
SODIUM SERPL-SCNC: 136 MMOL/L — SIGNIFICANT CHANGE UP (ref 135–146)
TOTAL CHOLESTEROL/HDL RATIO MEASUREMENT: 7.6 RATIO — HIGH (ref 4–5.5)
TRIGL SERPL-MCNC: 377 MG/DL — HIGH (ref 10–149)
WBC # BLD: 8.97 K/UL — SIGNIFICANT CHANGE UP (ref 4.8–10.8)
WBC # FLD AUTO: 8.97 K/UL — SIGNIFICANT CHANGE UP (ref 4.8–10.8)

## 2018-11-26 RX ADMIN — CLOPIDOGREL BISULFATE 75 MILLIGRAM(S): 75 TABLET, FILM COATED ORAL at 12:33

## 2018-11-26 RX ADMIN — ATORVASTATIN CALCIUM 80 MILLIGRAM(S): 80 TABLET, FILM COATED ORAL at 22:46

## 2018-11-26 RX ADMIN — Medication 125 MICROGRAM(S): at 06:36

## 2018-11-26 RX ADMIN — Medication 325 MILLIGRAM(S): at 12:34

## 2018-11-26 RX ADMIN — ENOXAPARIN SODIUM 40 MILLIGRAM(S): 100 INJECTION SUBCUTANEOUS at 12:34

## 2018-11-26 RX ADMIN — Medication 360 MILLIGRAM(S): at 06:36

## 2018-11-26 RX ADMIN — PANTOPRAZOLE SODIUM 40 MILLIGRAM(S): 20 TABLET, DELAYED RELEASE ORAL at 06:36

## 2018-11-26 RX ADMIN — LISINOPRIL 40 MILLIGRAM(S): 2.5 TABLET ORAL at 06:36

## 2018-11-26 NOTE — CONSULT NOTE ADULT - ASSESSMENT
Impression:  CVA   HO HTN and DM  Obesity    PLAN:    CNS: No depressants. Continue with aspirin  plavix and Lipitor for now. Neurology follow up. Q1 neuro checks.     HEENT: Oral care    PULMONARY:  HOB @ 45 degrees. Out patient sleep study.     CARDIOVASCULAR: Keep i=o. ECHO    GI: GI prophylaxis. NPO till neurology follow up.     RENAL:  Follow up lytes.  Correct as needed    INFECTIOUS DISEASE: Follow up cultures    HEMATOLOGICAL:  DVT prophylaxis.    ENDOCRINE:  Follow up FS.      MUSCULOSKELETAL:  MICU for now. Impression:  TIA  HO HTN and DM  Obesity  Probable BARRERA     PLAN:    CNS: No depressants. Continue with aspirin  Plavix and Lipitor for now. Neurology follow up. Q1 neuro checks.     HEENT: Oral care    PULMONARY:  HOB @ 45 degrees. Out patient sleep study.     CARDIOVASCULAR: Keep i=o. ECHO    GI: GI prophylaxis. NPO till neurology follow up.     RENAL:  Follow up lytes.  Correct as needed    INFECTIOUS DISEASE: Follow up cultures    HEMATOLOGICAL:  DVT prophylaxis.    ENDOCRINE:  Follow up FS.      MUSCULOSKELETAL:    MICU or Stroke Unit per Neuro

## 2018-11-26 NOTE — CONSULT NOTE ADULT - SUBJECTIVE AND OBJECTIVE BOX
Patient is a 58y old  Male who presents with a chief complaint of Midbasilar artery stenosis and TIA (25 Nov 2018 21:29)      HPI:  59 y/o Male, coming from home  PMHX of DM II, HTN, hyperlipidemia, abdominal aortic aneurysm last measuring 4.7 2 months ago, RA not on any meds, hypothyroidism presents to ED with H/O  slurred speech & R sided weakness. Pt reports symptoms first began ~ a week ago, he develops a burning under his eyes, right sided facial numbness, right UE & LE complete paralysis, and slurred speech. This has happened 3 times over the course of the week, lasting one a few minutes. The third time occurred last night, he states symptoms lasted less than 5 minutes while he was sleeping and got up from sleep. He did not come to the hospital as there was no one to take care of his kids and the  came today.     Denies fever, chills, cough, abdominal pain, n/v/d, chest pain, numbness, tingling. Denies blurry vision or visual changes. Pt does not have symptoms at this time while present in the ED and NIHSS scale is 0. Patient's BP was 196/100 HR 90s, was given 1L NS and 1L RL. Patient was also given Plavix 300mg,  with Lipitor 80. (25 Nov 2018 19:40)      PAST MEDICAL & SURGICAL HISTORY:  Skull fracture  Rheumatoid arthritis  Thyroid disease  Aortic aneurysm  Diabetes  HTN (hypertension)  No significant past surgical history      SOCIAL HX:   Smoking      : Yes                   ETOH          : No                  Other    FAMILY HISTORY:  Family history of stroke (Father)  :  No known cardiovacular family hisotry     ROS:  See HPI     Allergies    penicillin (Unknown)    PHYSICAL EXAM    ICU Vital Signs Last 24 Hrs  T(C): 36.6 (26 Nov 2018 06:00), Max: 37.1 (25 Nov 2018 13:29)  T(F): 97.9 (26 Nov 2018 06:00), Max: 98.8 (25 Nov 2018 13:29)  HR: 66 (26 Nov 2018 06:00) (66 - 83)  BP: 146/90 (26 Nov 2018 06:00) (146/90 - 193/100)  RR: 20 (26 Nov 2018 06:00) (18 - 22)  SpO2: 100% (26 Nov 2018 06:00) (96% - 100%)      General: In NAD   HEENT:  CURTIS              Lymphatic system: No cervical LN   Lungs: Bilateral BS  Cardiovascular: Regular  Gastrointestinal: Soft, Positive BS  Musculoskeletal: No clubbing.  Moves all extremities.  Full range of motion   Skin: Warm.  Intact  Neurological: No motor or sensory deficit       LABS:                          16.1   9.52  )-----------( 293      ( 25 Nov 2018 13:35 )             46.8                                               11-25    137  |  98  |  21<H>  ----------------------------<  266<H>  4.7   |  23  |  1.2    Ca    10.0      25 Nov 2018 13:35    TPro  7.3  /  Alb  4.0  /  TBili  0.3  /  DBili  x   /  AST  22  /  ALT  30  /  AlkPhos  105  11-25      PT/INR - ( 25 Nov 2018 13:35 )   PT: 11.20 sec;   INR: 0.97 ratio         PTT - ( 25 Nov 2018 13:35 )  PTT:33.0 sec                                           CARDIAC MARKERS ( 25 Nov 2018 13:35 )  x     / <0.01 ng/mL / x     / x     / x                                                LIVER FUNCTIONS - ( 25 Nov 2018 13:35 )  Alb: 4.0 g/dL / Pro: 7.3 g/dL / ALK PHOS: 105 U/L / ALT: 30 U/L / AST: 22 U/L / GGT: x                                                        X-Rays: NO infiltrate                                                                                    ECHO    MEDICATIONS  (STANDING):  aspirin  chewable 325 milliGRAM(s) Oral daily  atorvastatin 80 milliGRAM(s) Oral at bedtime  atorvastatin 80 milliGRAM(s) Oral at bedtime  clopidogrel Tablet 75 milliGRAM(s) Oral daily  diltiazem    milliGRAM(s) Oral daily  enoxaparin Injectable 40 milliGRAM(s) SubCutaneous daily  levothyroxine 125 MICROGram(s) Oral daily  lisinopril 40 milliGRAM(s) Oral daily  pantoprazole    Tablet 40 milliGRAM(s) Oral before breakfast    MEDICATIONS  (PRN):

## 2018-11-26 NOTE — PROGRESS NOTE ADULT - SUBJECTIVE AND OBJECTIVE BOX
58yMale    HPI:  58 year old right handed gentleman with a past medical history of DM II, HTN, hyperlipidemia, abdominal aortic aneurysm last measuring 4.7 2 months ago, RA not on any meds, hypothyroidism presents to ED with dysarthria and right sided weakness. Symptoms first began one week ago, he develops complete right upper and lower extremity paralysis, and dysarthria. These symptoms occurred three times over the course of a week, lasting less than ten minutes. Presented to the ED asymptomatic and with NIHSS score of 0.  CTH failed to reveal acute intracranial pathology, CTA head and neck revealed >90% stenosis of mid basilar artery.He was loaded with Plavix 300mg,  and Lipitor 80. We are seeing the patient from a neuroendovascular prospective.    PMH:  Skull fracture  Rheumatoid arthritis  Thyroid disease  Aortic aneurysm  Diabetes  HTN (hypertension)      Tests:  CTH failed to reveal acute intracranial pathology.  < from: CT Angio Head w/ IV Cont (11.25.18 @ 17:00) >  IMPRESSION:     1.  Short segment filling defect within the mid basilar artery producing   critical (>90%) stenosis. The distal basilar artery is noted to be   opacified which may partially reflect collateral circulation from a   prominent right P-comm.    2.  Mild stenoses of bilateral PCAs.    3.  No other major vascular stenosis demonstrated.    4.  Two soft tissue nodules within the left parotid gland measuring up to   1.4 cm. Recommend follow-up with ENT on an elective basis.    Overnight Events: None    Medication:  aspirin  chewable 325 milliGRAM(s) Oral daily  atorvastatin 80 milliGRAM(s) Oral at bedtime  atorvastatin 80 milliGRAM(s) Oral at bedtime  clopidogrel Tablet 75 milliGRAM(s) Oral daily  diltiazem    milliGRAM(s) Oral daily  enoxaparin Injectable 40 milliGRAM(s) SubCutaneous daily  levothyroxine 125 MICROGram(s) Oral daily  lisinopril 40 milliGRAM(s) Oral daily  pantoprazole    Tablet 40 milliGRAM(s) Oral before breakfast    Vitals  T(F): 96.9 (11-26-18 @ 13:20), Max: 98 (11-25-18 @ 22:26)  HR: 75 (11-26-18 @ 13:20) (66 - 78)  BP: 136/68 (11-26-18 @ 13:20) (135/83 - 184/107)  RR: 18 (11-26-18 @ 13:20) (18 - 22)  SpO2: 97% (11-26-18 @ 13:20) (96% - 100%)                        15.0   8.97  )-----------( 278      ( 26 Nov 2018 09:45 )             43.8     11-26    136  |  95<L>  |  17  ----------------------------<  235<H>  4.4   |  24  |  1.1    Ca    9.4      26 Nov 2018 09:45  Phos  2.7     11-26  Mg     2.0     11-26    TPro  7.3  /  Alb  4.0  /  TBili  0.3  /  DBili  x   /  AST  22  /  ALT  30  /  AlkPhos  105  11-25    PT/INR - ( 25 Nov 2018 13:35 )   PT: 11.20 sec;   INR: 0.97 ratio         PTT - ( 25 Nov 2018 13:35 )  PTT:33.0 sec    LIVER FUNCTIONS - ( 25 Nov 2018 13:35 )  Alb: 4.0 g/dL / Pro: 7.3 g/dL / ALK PHOS: 105 U/L / ALT: 30 U/L / AST: 22 U/L / GGT: x             Neuro Exam:  Patient awake alert oriented to self place time.  PEARLA EOMI no visual deficit.  No facial asymmetry  No dysarthria No Aphasia  No sensory deficit  Bilateral upper and lower extremities without drift.    NIHSS:0    Impression:  58 year old right handed gentleman with a past medical history of DM II, HTN, hyperlipidemia, abdominal aortic aneurysm last measuring 4.7 2 months ago, RA not on any meds, hypothyroidism presents to ED with dysarthria and right sided weakness.Presented to the ED asymptomatic and with NIHSS score of 0.  CTH failed to reveal acute intracranial pathology, CTA head and neck revealed >90% stenosis of mid basilar artery. He is now cared for in the ED and is asymptomatic.      Suggestions:  No intervention at this time, first lifestyle modifications and well as modification of cerebrovascular risk factors are most important.  Mediterranean diet.  Glycemic control.  Aerobic exercise 30 minutes per day for 5 days per week.  Drink 3 liters of water per day.    Continue ASA/PLavix.  Continue Lipitor.  Continue stroke workup.    Disposition:  From a NI perspective please follow up in the neuro endovascular clinic in 2-3 weeks.      Melvin Abreu NP  [phone] 58yMale    HPI:  58 year old right handed overweight gentleman with a past medical history of smoking, DM II, HTN, and hyperlipidemiapresents to ED with transient episode of dysarthria and right sided weakness. Symptoms first began one week ago, he develops complete right upper and lower extremity paralysis, and dysarthria. These symptoms occurred three times over the course of a week, lasting less than ten minutes. Presently he is asymptomatic and with NIHSS score of 0.  CTH failed to reveal acute intracranial pathology, CTA head and neck revealed >90% stenosis of mid basilar artery.He was loaded with Plavix 300mg,  and Lipitor 80. We are seeing the patient from a neuroendovascular prospective. He has been on baby Aspirin for unknown reason for past few months, but denies taking statin, doing aerobic exercise and having healthy lifestyle.    PMH:  Skull fracture  Rheumatoid arthritis  Thyroid disease  Aortic aneurysm  Diabetes  HTN (hypertension)      Tests:  CTH failed to reveal acute intracranial pathology.  < from: CT Angio Head w/ IV Cont (11.25.18 @ 17:00) >  IMPRESSION:     1.  Short segment filling defect within the mid basilar artery producing   critical (>90%) stenosis. The distal basilar artery is noted to be   opacified which may partially reflect collateral circulation from a   prominent right P-comm.    2.  Mild stenoses of bilateral PCAs.    3.  No other major vascular stenosis demonstrated.    4.  Two soft tissue nodules within the left parotid gland measuring up to   1.4 cm. Recommend follow-up with ENT on an elective basis.    Overnight Events: None    Medication:  aspirin  chewable 325 milliGRAM(s) Oral daily  atorvastatin 80 milliGRAM(s) Oral at bedtime  atorvastatin 80 milliGRAM(s) Oral at bedtime  clopidogrel Tablet 75 milliGRAM(s) Oral daily  diltiazem    milliGRAM(s) Oral daily  enoxaparin Injectable 40 milliGRAM(s) SubCutaneous daily  levothyroxine 125 MICROGram(s) Oral daily  lisinopril 40 milliGRAM(s) Oral daily  pantoprazole    Tablet 40 milliGRAM(s) Oral before breakfast    Vitals  T(F): 96.9 (11-26-18 @ 13:20), Max: 98 (11-25-18 @ 22:26)  HR: 75 (11-26-18 @ 13:20) (66 - 78)  BP: 136/68 (11-26-18 @ 13:20) (135/83 - 184/107)  RR: 18 (11-26-18 @ 13:20) (18 - 22)  SpO2: 97% (11-26-18 @ 13:20) (96% - 100%)                        15.0   8.97  )-----------( 278      ( 26 Nov 2018 09:45 )             43.8     11-26    136  |  95<L>  |  17  ----------------------------<  235<H>  4.4   |  24  |  1.1    Ca    9.4      26 Nov 2018 09:45  Phos  2.7     11-26  Mg     2.0     11-26    TPro  7.3  /  Alb  4.0  /  TBili  0.3  /  DBili  x   /  AST  22  /  ALT  30  /  AlkPhos  105  11-25    PT/INR - ( 25 Nov 2018 13:35 )   PT: 11.20 sec;   INR: 0.97 ratio         PTT - ( 25 Nov 2018 13:35 )  PTT:33.0 sec    LIVER FUNCTIONS - ( 25 Nov 2018 13:35 )  Alb: 4.0 g/dL / Pro: 7.3 g/dL / ALK PHOS: 105 U/L / ALT: 30 U/L / AST: 22 U/L / GGT: x             Neuro Exam:  Patient awake alert oriented to self place time.  PEARLA EOMI no visual deficit.  No facial asymmetry  No dysarthria No Aphasia  No sensory deficit  Bilateral upper and lower extremities without drift.    NIHSS:0    Impression:  A 58 year old right handed overweight gentleman with a past medical history of smoking, DM II, HTN, and hyperlipidemia experienced multiple episodes of transient right sided weakness and dysarthria. His proximal Basilar Artery is very stenotic, to my eyes, however his right PCOM and P1 segment seem to be prominent, assisting in blood supply of basilar system.     Suggestions:  No intervention at this time, first lifestyle modifications and well as modification of cerebrovascular risk factors are most important.  Mediterranean diet and weight loss.  Glycemic control.  Aerobic exercise 30 minutes per day for >5 days per week.  Drink 3 liters of water per day.  Continue ASA/PLavix for now.  Continue Lipitor.  TIA/Stroke workup per Stroke team.  If despite of following all the above-mentioned suggestions, he still experiences those symptoms at that point neurointervention may be warranted.    Disposition:  From a NI perspective please follow up in the neuro endovascular clinic in 2-3 months with a new CTA of the head and neck.      -I spent more than 45 minutes to review the chart, gather necessary information, evaluate the patient and discuss the case with primary team and counseling the patient to his satisfaction. Total visit time more than 60min.  Mariana Munguia MD.  [phone]

## 2018-11-26 NOTE — ED ADULT NURSE REASSESSMENT NOTE - NS ED NURSE REASSESS COMMENT FT1
a&o x3,jvaed, [facility], no c/o - awaits bed assignemnt
pt is npo , bs 166
vito c/o - javed, mariana, abd prabhakar gonzales, sr on montior, will continue to monotroer
a&o x3, lsc, abd large, soft, nt, # 18 l ac h/l site wnl, on cm sr, vs as noted, - PERRL, ambulating w/ steady gait, dneies any c/o or sx - awaits icu bed assignement - will comtinue to monitor
laciees c/oo, lsc, abd sfot, nt, large, sr on monitor, # 18 l ac h/l site wnl, no neuro deficits oted, ambulates in er, gait strady

## 2018-11-27 ENCOUNTER — TRANSCRIPTION ENCOUNTER (OUTPATIENT)
Age: 58
End: 2018-11-27

## 2018-11-27 VITALS
DIASTOLIC BLOOD PRESSURE: 73 MMHG | RESPIRATION RATE: 18 BRPM | SYSTOLIC BLOOD PRESSURE: 148 MMHG | OXYGEN SATURATION: 98 % | HEART RATE: 65 BPM | TEMPERATURE: 98 F

## 2018-11-27 DIAGNOSIS — Z71.89 OTHER SPECIFIED COUNSELING: ICD-10-CM

## 2018-11-27 LAB
ALBUMIN SERPL ELPH-MCNC: 4.2 G/DL — SIGNIFICANT CHANGE UP (ref 3.5–5.2)
ALP SERPL-CCNC: 98 U/L — SIGNIFICANT CHANGE UP (ref 30–115)
ALT FLD-CCNC: 28 U/L — SIGNIFICANT CHANGE UP (ref 0–41)
ANION GAP SERPL CALC-SCNC: 16 MMOL/L — HIGH (ref 7–14)
APTT BLD: 33.5 SEC — SIGNIFICANT CHANGE UP (ref 27–39.2)
AST SERPL-CCNC: 19 U/L — SIGNIFICANT CHANGE UP (ref 0–41)
BASOPHILS # BLD AUTO: 0.06 K/UL — SIGNIFICANT CHANGE UP (ref 0–0.2)
BASOPHILS NFR BLD AUTO: 0.7 % — SIGNIFICANT CHANGE UP (ref 0–1)
BILIRUB DIRECT SERPL-MCNC: <0.2 MG/DL — SIGNIFICANT CHANGE UP (ref 0–0.2)
BILIRUB INDIRECT FLD-MCNC: >0.1 MG/DL — LOW (ref 0.2–1.2)
BILIRUB SERPL-MCNC: 0.3 MG/DL — SIGNIFICANT CHANGE UP (ref 0.2–1.2)
BUN SERPL-MCNC: 20 MG/DL — SIGNIFICANT CHANGE UP (ref 10–20)
CALCIUM SERPL-MCNC: 9.6 MG/DL — SIGNIFICANT CHANGE UP (ref 8.5–10.1)
CHLORIDE SERPL-SCNC: 99 MMOL/L — SIGNIFICANT CHANGE UP (ref 98–110)
CO2 SERPL-SCNC: 23 MMOL/L — SIGNIFICANT CHANGE UP (ref 17–32)
CREAT SERPL-MCNC: 1.1 MG/DL — SIGNIFICANT CHANGE UP (ref 0.7–1.5)
EOSINOPHIL # BLD AUTO: 0.3 K/UL — SIGNIFICANT CHANGE UP (ref 0–0.7)
EOSINOPHIL NFR BLD AUTO: 3.3 % — SIGNIFICANT CHANGE UP (ref 0–8)
GLUCOSE SERPL-MCNC: 206 MG/DL — HIGH (ref 70–99)
HCT VFR BLD CALC: 46.8 % — SIGNIFICANT CHANGE UP (ref 42–52)
HGB BLD-MCNC: 16 G/DL — SIGNIFICANT CHANGE UP (ref 14–18)
IMM GRANULOCYTES NFR BLD AUTO: 0.6 % — HIGH (ref 0.1–0.3)
INR BLD: 0.91 RATIO — SIGNIFICANT CHANGE UP (ref 0.65–1.3)
LYMPHOCYTES # BLD AUTO: 2.5 K/UL — SIGNIFICANT CHANGE UP (ref 1.2–3.4)
LYMPHOCYTES # BLD AUTO: 27.8 % — SIGNIFICANT CHANGE UP (ref 20.5–51.1)
MAGNESIUM SERPL-MCNC: 2.1 MG/DL — SIGNIFICANT CHANGE UP (ref 1.8–2.4)
MCHC RBC-ENTMCNC: 30.4 PG — SIGNIFICANT CHANGE UP (ref 27–31)
MCHC RBC-ENTMCNC: 34.2 G/DL — SIGNIFICANT CHANGE UP (ref 32–37)
MCV RBC AUTO: 89 FL — SIGNIFICANT CHANGE UP (ref 80–94)
MONOCYTES # BLD AUTO: 0.75 K/UL — HIGH (ref 0.1–0.6)
MONOCYTES NFR BLD AUTO: 8.3 % — SIGNIFICANT CHANGE UP (ref 1.7–9.3)
NEUTROPHILS # BLD AUTO: 5.34 K/UL — SIGNIFICANT CHANGE UP (ref 1.4–6.5)
NEUTROPHILS NFR BLD AUTO: 59.3 % — SIGNIFICANT CHANGE UP (ref 42.2–75.2)
PLATELET # BLD AUTO: 278 K/UL — SIGNIFICANT CHANGE UP (ref 130–400)
POTASSIUM SERPL-MCNC: 4.6 MMOL/L — SIGNIFICANT CHANGE UP (ref 3.5–5)
POTASSIUM SERPL-SCNC: 4.6 MMOL/L — SIGNIFICANT CHANGE UP (ref 3.5–5)
PROT SERPL-MCNC: 7.1 G/DL — SIGNIFICANT CHANGE UP (ref 6–8)
PROTHROM AB SERPL-ACNC: 10.5 SEC — SIGNIFICANT CHANGE UP (ref 9.95–12.87)
RBC # BLD: 5.26 M/UL — SIGNIFICANT CHANGE UP (ref 4.7–6.1)
RBC # FLD: 12 % — SIGNIFICANT CHANGE UP (ref 11.5–14.5)
SODIUM SERPL-SCNC: 138 MMOL/L — SIGNIFICANT CHANGE UP (ref 135–146)
TSH SERPL-MCNC: 4.88 UIU/ML — HIGH (ref 0.27–4.2)
WBC # BLD: 9 K/UL — SIGNIFICANT CHANGE UP (ref 4.8–10.8)
WBC # FLD AUTO: 9 K/UL — SIGNIFICANT CHANGE UP (ref 4.8–10.8)

## 2018-11-27 RX ORDER — ATORVASTATIN CALCIUM 80 MG/1
1 TABLET, FILM COATED ORAL
Qty: 30 | Refills: 0
Start: 2018-11-27 | End: 2018-12-26

## 2018-11-27 RX ORDER — CLOPIDOGREL BISULFATE 75 MG/1
1 TABLET, FILM COATED ORAL
Qty: 30 | Refills: 0 | OUTPATIENT
Start: 2018-11-27 | End: 2018-12-26

## 2018-11-27 RX ADMIN — Medication 360 MILLIGRAM(S): at 06:04

## 2018-11-27 RX ADMIN — Medication 125 MICROGRAM(S): at 06:04

## 2018-11-27 RX ADMIN — Medication 325 MILLIGRAM(S): at 12:36

## 2018-11-27 RX ADMIN — LISINOPRIL 40 MILLIGRAM(S): 2.5 TABLET ORAL at 06:04

## 2018-11-27 RX ADMIN — CLOPIDOGREL BISULFATE 75 MILLIGRAM(S): 75 TABLET, FILM COATED ORAL at 12:36

## 2018-11-27 RX ADMIN — PANTOPRAZOLE SODIUM 40 MILLIGRAM(S): 20 TABLET, DELAYED RELEASE ORAL at 06:04

## 2018-11-27 NOTE — DISCHARGE NOTE ADULT - CARE PLAN
Principal Discharge DX:	Cerebrovascular accident (CVA), unspecified mechanism  Goal:	prevention of future strokes  Assessment and plan of treatment:	treatment and control of risk factors, cont asa and plavix and lipitor, follow up with neurologist  Secondary Diagnosis:	Type 2 diabetes mellitus without complication, without long-term current use of insulin  Goal:	sugar control  Assessment and plan of treatment:	cont meds, follow up with PMD  Secondary Diagnosis:	Hypertension, unspecified type  Goal:	Blood pressure control  Assessment and plan of treatment:	cont meds, follow up with PMD  Secondary Diagnosis:	Thyroid disease  Goal:	control  Assessment and plan of treatment:	cont meds, follow up with PMD

## 2018-11-27 NOTE — DISCHARGE NOTE ADULT - SECONDARY DIAGNOSIS.
Type 2 diabetes mellitus without complication, without long-term current use of insulin Hypertension, unspecified type Thyroid disease

## 2018-11-27 NOTE — PROGRESS NOTE ADULT - SUBJECTIVE AND OBJECTIVE BOX
HPI:  59 y/o Male, coming from home  PMHX of DM II, HTN, hyperlipidemia, abdominal aortic aneurysm last measuring 4.7 2 months ago, RA not on any meds, hypothyroidism presents to ED with H/O  slurred speech & R sided weakness. Pt reports symptoms first began ~ a week ago, he develops a burning under his eyes, right sided facial numbness, right UE & LE complete paralysis, and slurred speech. This has happened 3 times over the course of the week, lasting one a few minutes. The third time occurred last night, he states symptoms lasted less than 5 minutes while he was sleeping and got up from sleep. He did not come to the hospital as there was no one to take care of his kids and the  came today.     Denies fever, chills, cough, abdominal pain, n/v/d, chest pain, numbness, tingling. Denies blurry vision or visual changes. Pt does not have symptoms at this time while present in the ED and NIHSS scale is 0. Patient's BP was 196/100 HR 90s, was given 1L NS and 1L RL. Patient was also given Plavix 300mg,  with Lipitor 80. (25 Nov 2018 19:40)    today he has no residual symptoms    discussed with neuro    Vital Signs Last 24 Hrs  T(C): 36.7 (27 Nov 2018 08:06), Max: 36.7 (27 Nov 2018 08:06)  T(F): 98 (27 Nov 2018 08:06), Max: 98 (27 Nov 2018 08:06)  HR: 65 (27 Nov 2018 08:06) (51 - 76)  BP: 148/73 (27 Nov 2018 08:06) (136/68 - 157/86)  BP(mean): --  RR: 18 (27 Nov 2018 08:06) (18 - 18)  SpO2: 98% (27 Nov 2018 08:06) (94% - 98%)    Physical exam:   constitutional NAD, AAOX3, Respiratory  lungs CTA, CVS heart RRR, GI: abdomen Soft NT, ND, BS+, skin: intact  Neurological Exam:   Mental status: Awake, alert and oriented x3.  Recent and remote memory intact.  Naming, repetition and comprehension intact.  Attention/concentration intact.  No dysarthria, no aphasia.  Fund of knowledge appropriate.    Cranial nerves: pupils equally round and reactive to light, visual fields full, no nystagmus, extraocular muscles intact, V1 through V3 intact bilaterally and symmetric, face symmetric, hearing intact to finger rub, palate elevation symmetric, tongue was midline, sternocleidomastoid/shoulder shrug strength bilaterally 5/5.    Motor:  Normal bulk and tone, strength 5/5 in bilateral upper and lower extremities.   strength 5/5.  Rapid alternating movements intact and symmetric.   Sensation: Intact to light touch, proprioception, and pinprick.  No neglect.   Coordination: No dysmetria on finger-to-nose and heel-to-shin.  No clumsiness.  Reflexes: 2+ in upper and lower extremities, downgoing toes bilaterally  Gait: Narrow and steady. No ataxia.  Romberg negative                        16.0   9.00  )-----------( 278      ( 27 Nov 2018 09:43 )             46.8   11-27    138  |  99  |  20  ----------------------------<  206<H>  4.6   |  23  |  1.1    Ca    9.6      27 Nov 2018 09:43  Phos  2.7     11-26  Mg     2.1     11-27    TPro  7.1  /  Alb  4.2  /  TBili  0.3  /  DBili  <0.2  /  AST  19  /  ALT  28  /  AlkPhos  98  11-27    < from: CT Angio Neck w/ IV Cont (11.25.18 @ 17:00) >  1.  Short segment filling defect within the mid basilar artery producing   critical (>90%) stenosis. The distal basilar artery is noted to be   opacified which may partially reflect collateral circulation from a   prominent right P-comm.    2.  Mild stenoses of bilateral PCAs.    3.  No other major vascular stenosis demonstrated.    4.  Two soft tissue nodules within the left parotid gland measuring up to   1.4 cm. Recommend follow-up with ENT on an elective basis.    < end of copied text >    < from: MR Head No Cont (11.26.18 @ 18:09) >  1.  Acute left pontine lacunarinfarct.    2.  Scattered T2 and FLAIR hyperintensities periventricular and   subcortical white matter and bilateral maria del carmen which are nonspecific and   without mass effect most likely consistent with chronic small vessel   ischemic changes.    3.  No acute intracranial hemorrhage.    < end of copied text >    Assessment- Patient with multiple TIA's for last 2 weeks found to have severe midbasilar stenosis and found to have acute lacunar stroke left pontine .  Not interventional candidate, needs aggressive management of risk factors   Plan  1. Discharge on ASA 81mg and plavix 75mg QD and statin 80mg QD  2. Diet and hydration counseling provided.  3. Weight reduction  4. Smoking cessation (quit 10 days ago)  5. Exercise regimen  6. F/u with me next week and with Dr. Munguia in 3 weeks  7. F/u with PMD    dc home, time spent 35 min

## 2018-11-27 NOTE — PROGRESS NOTE ADULT - SUBJECTIVE AND OBJECTIVE BOX
Neurology Follow up note    Name  JORI BOYKIN    HPI:  57 y/o Male, coming from home  PMHX of DM II, HTN, hyperlipidemia, abdominal aortic aneurysm last measuring 4.7 2 months ago, RA not on any meds, hypothyroidism presents to ED with H/O  slurred speech & R sided weakness. Pt reports symptoms first began ~ a week ago, he develops a burning under his eyes, right sided facial numbness, right UE & LE complete paralysis, and slurred speech. This has happened 3 times over the course of the week, lasting one a few minutes. The third time occurred last night, he states symptoms lasted less than 5 minutes while he was sleeping and got up from sleep. He did not come to the hospital as there was no one to take care of his kids and the  came today.     Denies fever, chills, cough, abdominal pain, n/v/d, chest pain, numbness, tingling. Denies blurry vision or visual changes. Pt does not have symptoms at this time while present in the ED and NIHSS scale is 0. Patient's BP was 196/100 HR 90s, was given 1L NS and 1L RL. Patient was also given Plavix 300mg,  with Lipitor 80. (25 Nov 2018 19:40)      Interval History - Patient doing well with no new complaints.  Reviewed MRI findings with patient and spent 25 minutes on counseling about risk factors for worsening cerebrovascular disease          Vital Signs Last 24 Hrs  T(C): 36.7 (27 Nov 2018 08:06), Max: 36.7 (27 Nov 2018 08:06)  T(F): 98 (27 Nov 2018 08:06), Max: 98 (27 Nov 2018 08:06)  HR: 65 (27 Nov 2018 08:06) (51 - 76)  BP: 148/73 (27 Nov 2018 08:06) (136/68 - 157/86)  BP(mean): --  RR: 18 (27 Nov 2018 08:06) (18 - 18)  SpO2: 98% (27 Nov 2018 08:06) (94% - 98%)  ICU Vital Signs Last 24 Hrs  T(C): 36.7 (27 Nov 2018 08:06), Max: 36.7 (27 Nov 2018 08:06)  T(F): 98 (27 Nov 2018 08:06), Max: 98 (27 Nov 2018 08:06)  HR: 65 (27 Nov 2018 08:06) (51 - 76)  BP: 148/73 (27 Nov 2018 08:06) (136/68 - 157/86)  BP(mean): --  ABP: --  ABP(mean): --  RR: 18 (27 Nov 2018 08:06) (18 - 18)  SpO2: 98% (27 Nov 2018 08:06) (94% - 98%)          Neurological Exam:   Mental status: Awake, alert and oriented x3.  Recent and remote memory intact.  Naming, repetition and comprehension intact.  Attention/concentration intact.  No dysarthria, no aphasia.  Fund of knowledge appropriate.    Cranial nerves: pupils equally round and reactive to light, visual fields full, no nystagmus, extraocular muscles intact, V1 through V3 intact bilaterally and symmetric, face symmetric, hearing intact to finger rub, palate elevation symmetric, tongue was midline, sternocleidomastoid/shoulder shrug strength bilaterally 5/5.    Motor:  Normal bulk and tone, strength 5/5 in bilateral upper and lower extremities.   strength 5/5.  Rapid alternating movements intact and symmetric.   Sensation: Intact to light touch, proprioception, and pinprick.  No neglect.   Coordination: No dysmetria on finger-to-nose and heel-to-shin.  No clumsiness.  Reflexes: 2+ in upper and lower extremities, downgoing toes bilaterally  Gait: Narrow and steady. No ataxia.  Romberg negative    Medications  aspirin  chewable 325 milliGRAM(s) Oral daily  atorvastatin 80 milliGRAM(s) Oral at bedtime  atorvastatin 80 milliGRAM(s) Oral at bedtime  clopidogrel Tablet 75 milliGRAM(s) Oral daily  diltiazem    milliGRAM(s) Oral daily  enoxaparin Injectable 40 milliGRAM(s) SubCutaneous daily  levothyroxine 125 MICROGram(s) Oral daily  lisinopril 40 milliGRAM(s) Oral daily  pantoprazole    Tablet 40 milliGRAM(s) Oral before breakfast      Lab                        16.0   9.00  )-----------( 278      ( 27 Nov 2018 09:43 )             46.8     11-27    138  |  99  |  20  ----------------------------<  206<H>  4.6   |  23  |  1.1    Ca    9.6      27 Nov 2018 09:43  Phos  2.7     11-26  Mg     2.1     11-27    TPro  7.1  /  Alb  4.2  /  TBili  0.3  /  DBili  <0.2  /  AST  19  /  ALT  28  /  AlkPhos  98  11-27    CAPILLARY BLOOD GLUCOSE        LIVER FUNCTIONS - ( 27 Nov 2018 09:43 )  Alb: 4.2 g/dL / Pro: 7.1 g/dL / ALK PHOS: 98 U/L / ALT: 28 U/L / AST: 19 U/L / GGT: x           PT/INR - ( 27 Nov 2018 09:43 )   PT: 10.50 sec;   INR: 0.91 ratio         PTT - ( 27 Nov 2018 09:43 )  PTT:33.5 sec    Radiology  < from: MR Head No Cont (11.26.18 @ 18:09) >  EXAM:  MR BRAIN            PROCEDURE DATE:  11/26/2018            INTERPRETATION:  Clinical History / Reason for exam: Slurred speech.   Stroke. Rule out stroke.    Technique: Sagittal and axial T1-FLAIR, axial T2-FSE-FS images, axial   W2-ZWJWN-NIoaycil, axial gradient echo images, axial SWAN images and   axial diffusion weighted images of the brain were obtained on the 3.0   Safia magnet without administration of intravenous contrast.      Comparison: Noncontrast CT head dated 11/25/2018.     Findings: The ventricles, basal cisterns and sulcal pattern are slightly   prominent consistent with parenchymal volume loss.  There are scattered   patchy and punctate foci of T2 and FLAIR hyperintensities in the   bilateral frontal and parietal periventricular and subcortical white   matter and bilateral maria [place] car[place] which are nonspecific and without mass effect   most likely consistent with chronic small vessel ischemic changes in a   patient of this stated age.      Small focus of signal abnormalityis noted in the left maria [place] carmen just to the   left of midline (series 3; image #13) which demonstrates a hyperintense   signal on axial diffusion-weighted images with a drop out of signal on   ADC map consistent with a small focal acute lacunar infarct. There is no   acute mass effect, midline shift or hemorrhage.  No extra axial fluid   collections are identified.    There are no acute infarcts on diffusion weighted images.  Expected   signal flow voids are noted in the major intracranial vessels consistent   with their patency.    Globes and orbits are grossly within normal limits. Signal abnormality is   noted in the left greater than right ethmoid, sphenoid and right greater   than left maxillary sinuses most likely consistent with thickening and   small mucous retention cyst. The remaining paranasal sinuses and   bilateral mastoid complexes are within normal limits.     There is no bone marrow signal abnormality. The sella is unremarkable.      IMPRESSION:    1.  Acute left pontine lacunarinfarct.    2.  Scattered T2 and FLAIR hyperintensities periventricular and   subcortical white matter and bilateral maria [place] carmen which are nonspecific and   without mass effect most likely consistent with chronic small vessel   ischemic changes.    3.  No acute intracranial hemorrhage.      < end of copied text >      < from: CT Angio Neck w/ IV Cont (11.25.18 @ 17:00) >  EXAM:  CT ANGIO NECK (W)AW IC        EXAM:  CT ANGIO BRAIN (W)AW IC            PROCEDURE DATE:  11/25/2018            INTERPRETATION:  CLINICAL HISTORY / REASON FOR EXAM: Slurred speech.    TECHNIQUE: CTA of the head and neck was performed after the intravenous   administration of 120 cc Optiray 350 contrast. Sagittal, coronal and   axial reformatted images were obtained as well as 3-D volume rendered   images.    COMPARISON: None.    FINDINGS:    CTA neck:   The visualized aortic arch and great vessel origins demonstrate small   amount of calcific plaque without significant stenosis.    The right common, internal and external carotid arteries are patent.    The left common carotid artery is patent. There is atheromatous plaque at   the left carotid bifurcation without significant stenosis.    The vertebral arteries are patent bilaterally, dominant on the left.    CTA head:   There is calcific plaque at the carotid siphons with mild stenosis on the   left. There is contour irregularity of the left cavernous ICA likely due   to atherosclerosis.    The anterior and middle cerebral arteries are patent.    The nondominant right vertebral artery terminates as the PICA, normal   variant. The left vertebral artery is patent.    Basilar artery demonstrates a short segment critical (greater than 90%)   stenosis on the basis of noncalcified filling defect. There is   desiccation of the distal basilar artery and posterior cerebral arteries.   There is a prominent right posterior communicating artery. The left   posterior communicating artery is not visualized.    The posterior cerebral arteries are patent with mild stenoses bilaterally.    Other:   Mild scattered paranasal sinus mucosal disease.  Moderate cervical spine degenerative changes. Partial fusion across C2-3   there is likely congenital.  Emphysematous changes at the lung apices.     Large confluent maxillary central periapical lucencies traversing the   bilateral central and lateral incisor teeth, and additional scattered   periapical lucencies noted. Recommend follow-up with dental exam.    There is two soft tissue nodules within the left parotid gland measuring   1.4 and 0.9 cm.    IMPRESSION:     1.  Short segment filling defect within the mid basilar artery producing   critical (>90%) stenosis. The distal basilar artery is noted to be   opacified which may partially reflect collateral circulation from a   prominent right P-comm.    2.  Mild stenoses of bilateral PCAs.    3.  No other major vascular stenosis demonstrated.    4.  Two soft tissue nodules within the left parotid gland measuring up to   1.4 cm. Recommend follow-up with ENT on an elective basis.            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  Not interventional candidate and time spent on discussing medical management of basilar disease.    Plan  1. Discharge on ASA 81mg and plavix 75mg QD and statin 80mg QD  2. Diet and hydration counseling provided.  3. Weight reduction  4. Smoking cessation (quit 10 days ago)  5. Exercise regimen  6. F/u with me next week and with Dr. Munguia in 3 weeks  7. F/u with Cardiologist Dr. Asher

## 2018-11-27 NOTE — DISCHARGE NOTE ADULT - CARE PROVIDERS DIRECT ADDRESSES
[email or web address]

## 2018-11-27 NOTE — DISCHARGE NOTE ADULT - PLAN OF CARE
prevention of future strokes treatment and control of risk factors, cont asa and plavix and lipitor, follow up with neurologist sugar control cont meds, follow up with PMD Blood pressure control control

## 2018-11-27 NOTE — DISCHARGE NOTE ADULT - NS AS DC STROKE ED MATERIALS
Call 911 for Stroke/Stroke Education Booklet/Risk Factors for Stroke/Stroke Warning Signs and Symptoms/Prescribed Medications/Need for Followup After Discharge

## 2018-11-27 NOTE — DISCHARGE NOTE ADULT - PATIENT PORTAL LINK FT
You can access the [facility] Patient Portal, offered by [facility], by registering with the following website: [email or web address]

## 2018-11-27 NOTE — DISCHARGE NOTE ADULT - CARE PROVIDER_API CALL
Anish Sullivan), EEGEpilepsy; Neurology  [address]  [address]  [place] [address]  Phone: [phone]  Fax: [phone]    David Cox), Family Medicine  [address] [place] [id number]  Phone: [phone]  Fax: [phone]

## 2018-11-27 NOTE — DISCHARGE NOTE ADULT - HOSPITAL COURSE
HPI:  59 y/o Male, coming from home  PMHX of DM II, HTN, hyperlipidemia, abdominal aortic aneurysm last measuring 4.7 2 months ago, RA not on any meds, hypothyroidism presents to ED with H/O  slurred speech & R sided weakness. Pt reports symptoms first began ~ a week ago, he develops a burning under his eyes, right sided facial numbness, right UE & LE complete paralysis, and slurred speech. This has happened 3 times over the course of the week, lasting one a few minutes. The third time occurred last night, he states symptoms lasted less than 5 minutes while he was sleeping and got up from sleep. He did not come to the hospital as there was no one to take care of his kids and the  came today.   pt was seen by neurology, has no residual symptoms, ok to dc home and follow up with neurology as outpt  In MRI found to have a pontine stroke.   Case discussed with neurology.

## 2018-11-27 NOTE — DISCHARGE NOTE ADULT - MEDICATION SUMMARY - MEDICATIONS TO TAKE
I will START or STAY ON the medications listed below when I get home from the hospital:    aspirin 81 mg oral tablet, chewable  -- 1 tab(s) by mouth once a day  -- Indication: For Stroke    ramipril 10 mg oral capsule  -- 1 cap(s) by mouth once a day  -- Indication: For Hypertension    dilTIAZem 360 mg/24 hours oral tablet, extended release  -- 1 tab(s) by mouth once a day  -- Indication: For Hypertension    metFORMIN 500 mg oral tablet  -- 1 tab(s) by mouth 3 times a day  -- Indication: For Diabetes    atorvastatin 80 mg oral tablet  -- 1 tab(s) by mouth once a day (at bedtime)  -- Indication: For Stroke    clopidogrel 75 mg oral tablet  -- 1 tab(s) by mouth once a day  -- Indication: For Stroke    levothyroxine 125 mcg (0.125 mg) oral capsule  -- 1 cap(s) by mouth once a day  -- Indication: For Hypothyroidism

## 2018-11-28 PROBLEM — E11.9 TYPE 2 DIABETES MELLITUS WITHOUT COMPLICATIONS: Chronic | Status: ACTIVE | Noted: 2018-11-25

## 2018-11-28 PROBLEM — I71.9 AORTIC ANEURYSM OF UNSPECIFIED SITE, WITHOUT RUPTURE: Chronic | Status: ACTIVE | Noted: 2018-11-25

## 2018-11-28 PROBLEM — S02.91XA UNSPECIFIED FRACTURE OF SKULL, INITIAL ENCOUNTER FOR CLOSED FRACTURE: Chronic | Status: ACTIVE | Noted: 2018-11-25

## 2018-11-28 PROBLEM — I10 ESSENTIAL (PRIMARY) HYPERTENSION: Chronic | Status: ACTIVE | Noted: 2018-11-25

## 2018-11-28 PROBLEM — E07.9 DISORDER OF THYROID, UNSPECIFIED: Chronic | Status: ACTIVE | Noted: 2018-11-25

## 2018-12-02 DIAGNOSIS — R47.81 SLURRED SPEECH: ICD-10-CM

## 2018-12-02 DIAGNOSIS — F17.200 NICOTINE DEPENDENCE, UNSPECIFIED, UNCOMPLICATED: ICD-10-CM

## 2018-12-02 DIAGNOSIS — Z88.0 ALLERGY STATUS TO PENICILLIN: ICD-10-CM

## 2018-12-02 DIAGNOSIS — I10 ESSENTIAL (PRIMARY) HYPERTENSION: ICD-10-CM

## 2018-12-02 DIAGNOSIS — R20.0 ANESTHESIA OF SKIN: ICD-10-CM

## 2018-12-02 DIAGNOSIS — M06.9 RHEUMATOID ARTHRITIS, UNSPECIFIED: ICD-10-CM

## 2018-12-02 DIAGNOSIS — G81.91 HEMIPLEGIA, UNSPECIFIED AFFECTING RIGHT DOMINANT SIDE: ICD-10-CM

## 2018-12-02 DIAGNOSIS — G47.33 OBSTRUCTIVE SLEEP APNEA (ADULT) (PEDIATRIC): ICD-10-CM

## 2018-12-02 DIAGNOSIS — I63.9 CEREBRAL INFARCTION, UNSPECIFIED: ICD-10-CM

## 2018-12-02 DIAGNOSIS — Z79.82 LONG TERM (CURRENT) USE OF ASPIRIN: ICD-10-CM

## 2018-12-02 DIAGNOSIS — Z28.21 IMMUNIZATION NOT CARRIED OUT BECAUSE OF PATIENT REFUSAL: ICD-10-CM

## 2018-12-02 DIAGNOSIS — I65.1 OCCLUSION AND STENOSIS OF BASILAR ARTERY: ICD-10-CM

## 2018-12-02 DIAGNOSIS — I71.9 AORTIC ANEURYSM OF UNSPECIFIED SITE, WITHOUT RUPTURE: ICD-10-CM

## 2018-12-02 DIAGNOSIS — Z87.81 PERSONAL HISTORY OF (HEALED) TRAUMATIC FRACTURE: ICD-10-CM

## 2018-12-02 DIAGNOSIS — E78.5 HYPERLIPIDEMIA, UNSPECIFIED: ICD-10-CM

## 2018-12-02 DIAGNOSIS — R47.1 DYSARTHRIA AND ANARTHRIA: ICD-10-CM

## 2018-12-02 DIAGNOSIS — Z79.84 LONG TERM (CURRENT) USE OF ORAL HYPOGLYCEMIC DRUGS: ICD-10-CM

## 2018-12-02 DIAGNOSIS — E03.9 HYPOTHYROIDISM, UNSPECIFIED: ICD-10-CM

## 2018-12-02 DIAGNOSIS — G45.9 TRANSIENT CEREBRAL ISCHEMIC ATTACK, UNSPECIFIED: ICD-10-CM

## 2018-12-02 DIAGNOSIS — E11.9 TYPE 2 DIABETES MELLITUS WITHOUT COMPLICATIONS: ICD-10-CM

## 2018-12-04 ENCOUNTER — APPOINTMENT (OUTPATIENT)
Dept: VASCULAR SURGERY | Facility: CLINIC | Age: 58
End: 2018-12-04
Payer: MEDICAID

## 2018-12-04 PROCEDURE — 99212 OFFICE O/P EST SF 10 MIN: CPT

## 2018-12-04 PROCEDURE — 93978 VASCULAR STUDY: CPT

## 2018-12-18 DIAGNOSIS — Z76.89 PERSONS ENCOUNTERING HEALTH SERVICES IN OTHER SPECIFIED CIRCUMSTANCES: ICD-10-CM

## 2018-12-30 ENCOUNTER — INPATIENT (INPATIENT)
Facility: HOSPITAL | Age: 58
LOS: 1 days | Discharge: HOME | End: 2019-01-01
Attending: INTERNAL MEDICINE | Admitting: INTERNAL MEDICINE

## 2018-12-30 VITALS
OXYGEN SATURATION: 99 % | SYSTOLIC BLOOD PRESSURE: 212 MMHG | WEIGHT: 294.98 LBS | RESPIRATION RATE: 18 BRPM | DIASTOLIC BLOOD PRESSURE: 129 MMHG | HEART RATE: 97 BPM | TEMPERATURE: 97 F

## 2018-12-30 DIAGNOSIS — Z98.890 OTHER SPECIFIED POSTPROCEDURAL STATES: Chronic | ICD-10-CM

## 2018-12-30 DIAGNOSIS — Z90.49 ACQUIRED ABSENCE OF OTHER SPECIFIED PARTS OF DIGESTIVE TRACT: Chronic | ICD-10-CM

## 2018-12-30 LAB
ALBUMIN SERPL ELPH-MCNC: 4.2 G/DL — SIGNIFICANT CHANGE UP (ref 3.5–5.2)
ALP SERPL-CCNC: 130 U/L — HIGH (ref 30–115)
ALT FLD-CCNC: 29 U/L — SIGNIFICANT CHANGE UP (ref 0–41)
ANION GAP SERPL CALC-SCNC: 19 MMOL/L — HIGH (ref 7–14)
APPEARANCE UR: CLEAR — SIGNIFICANT CHANGE UP
APTT BLD: 33.9 SEC — SIGNIFICANT CHANGE UP (ref 27–39.2)
AST SERPL-CCNC: 18 U/L — SIGNIFICANT CHANGE UP (ref 0–41)
BASOPHILS # BLD AUTO: 0.09 K/UL — SIGNIFICANT CHANGE UP (ref 0–0.2)
BASOPHILS NFR BLD AUTO: 0.9 % — SIGNIFICANT CHANGE UP (ref 0–1)
BILIRUB SERPL-MCNC: 0.3 MG/DL — SIGNIFICANT CHANGE UP (ref 0.2–1.2)
BILIRUB UR-MCNC: NEGATIVE — SIGNIFICANT CHANGE UP
BUN SERPL-MCNC: 21 MG/DL — HIGH (ref 10–20)
CALCIUM SERPL-MCNC: 9.4 MG/DL — SIGNIFICANT CHANGE UP (ref 8.5–10.1)
CHLORIDE SERPL-SCNC: 94 MMOL/L — LOW (ref 98–110)
CO2 SERPL-SCNC: 22 MMOL/L — SIGNIFICANT CHANGE UP (ref 17–32)
COLOR SPEC: YELLOW — SIGNIFICANT CHANGE UP
CREAT SERPL-MCNC: 1.3 MG/DL — SIGNIFICANT CHANGE UP (ref 0.7–1.5)
DIFF PNL FLD: NEGATIVE — SIGNIFICANT CHANGE UP
EOSINOPHIL # BLD AUTO: 0.29 K/UL — SIGNIFICANT CHANGE UP (ref 0–0.7)
EOSINOPHIL NFR BLD AUTO: 3.1 % — SIGNIFICANT CHANGE UP (ref 0–8)
GLUCOSE BLDC GLUCOMTR-MCNC: 197 MG/DL — HIGH (ref 70–99)
GLUCOSE BLDC GLUCOMTR-MCNC: 310 MG/DL — HIGH (ref 70–99)
GLUCOSE SERPL-MCNC: 353 MG/DL — HIGH (ref 70–99)
GLUCOSE UR QL: >=1000
HCT VFR BLD CALC: 45.5 % — SIGNIFICANT CHANGE UP (ref 42–52)
HGB BLD-MCNC: 15.6 G/DL — SIGNIFICANT CHANGE UP (ref 14–18)
IMM GRANULOCYTES NFR BLD AUTO: 0.6 % — HIGH (ref 0.1–0.3)
INR BLD: 0.93 RATIO — SIGNIFICANT CHANGE UP (ref 0.65–1.3)
KETONES UR-MCNC: NEGATIVE — SIGNIFICANT CHANGE UP
LEUKOCYTE ESTERASE UR-ACNC: NEGATIVE — SIGNIFICANT CHANGE UP
LYMPHOCYTES # BLD AUTO: 2.71 K/UL — SIGNIFICANT CHANGE UP (ref 1.2–3.4)
LYMPHOCYTES # BLD AUTO: 28.5 % — SIGNIFICANT CHANGE UP (ref 20.5–51.1)
MCHC RBC-ENTMCNC: 30.1 PG — SIGNIFICANT CHANGE UP (ref 27–31)
MCHC RBC-ENTMCNC: 34.3 G/DL — SIGNIFICANT CHANGE UP (ref 32–37)
MCV RBC AUTO: 87.7 FL — SIGNIFICANT CHANGE UP (ref 80–94)
MONOCYTES # BLD AUTO: 0.84 K/UL — HIGH (ref 0.1–0.6)
MONOCYTES NFR BLD AUTO: 8.8 % — SIGNIFICANT CHANGE UP (ref 1.7–9.3)
NEUTROPHILS # BLD AUTO: 5.51 K/UL — SIGNIFICANT CHANGE UP (ref 1.4–6.5)
NEUTROPHILS NFR BLD AUTO: 58.1 % — SIGNIFICANT CHANGE UP (ref 42.2–75.2)
NITRITE UR-MCNC: NEGATIVE — SIGNIFICANT CHANGE UP
NRBC # BLD: 0 /100 WBCS — SIGNIFICANT CHANGE UP (ref 0–0)
PH UR: 6 — SIGNIFICANT CHANGE UP (ref 5–8)
PLATELET # BLD AUTO: 320 K/UL — SIGNIFICANT CHANGE UP (ref 130–400)
POTASSIUM SERPL-MCNC: 4.1 MMOL/L — SIGNIFICANT CHANGE UP (ref 3.5–5)
POTASSIUM SERPL-SCNC: 4.1 MMOL/L — SIGNIFICANT CHANGE UP (ref 3.5–5)
PROT SERPL-MCNC: 7.3 G/DL — SIGNIFICANT CHANGE UP (ref 6–8)
PROT UR-MCNC: NEGATIVE — SIGNIFICANT CHANGE UP
PROTHROM AB SERPL-ACNC: 10.7 SEC — SIGNIFICANT CHANGE UP (ref 9.95–12.87)
RBC # BLD: 5.19 M/UL — SIGNIFICANT CHANGE UP (ref 4.7–6.1)
RBC # FLD: 12.1 % — SIGNIFICANT CHANGE UP (ref 11.5–14.5)
SODIUM SERPL-SCNC: 135 MMOL/L — SIGNIFICANT CHANGE UP (ref 135–146)
SP GR SPEC: >=1.03 — SIGNIFICANT CHANGE UP (ref 1.01–1.03)
TROPONIN T SERPL-MCNC: <0.01 NG/ML — SIGNIFICANT CHANGE UP
TROPONIN T SERPL-MCNC: <0.01 NG/ML — SIGNIFICANT CHANGE UP
UROBILINOGEN FLD QL: 0.2 — SIGNIFICANT CHANGE UP (ref 0.2–0.2)
WBC # BLD: 9.5 K/UL — SIGNIFICANT CHANGE UP (ref 4.8–10.8)
WBC # FLD AUTO: 9.5 K/UL — SIGNIFICANT CHANGE UP (ref 4.8–10.8)

## 2018-12-30 RX ORDER — LEVOTHYROXINE SODIUM 125 MCG
125 TABLET ORAL DAILY
Qty: 0 | Refills: 0 | Status: DISCONTINUED | OUTPATIENT
Start: 2018-12-30 | End: 2019-01-01

## 2018-12-30 RX ORDER — ASPIRIN/CALCIUM CARB/MAGNESIUM 324 MG
325 TABLET ORAL DAILY
Qty: 0 | Refills: 0 | Status: DISCONTINUED | OUTPATIENT
Start: 2018-12-31 | End: 2019-01-01

## 2018-12-30 RX ORDER — NITROGLYCERIN 6.5 MG
0.4 CAPSULE, EXTENDED RELEASE ORAL
Qty: 0 | Refills: 0 | Status: DISCONTINUED | OUTPATIENT
Start: 2018-12-30 | End: 2019-01-01

## 2018-12-30 RX ORDER — PANTOPRAZOLE SODIUM 20 MG/1
40 TABLET, DELAYED RELEASE ORAL
Qty: 0 | Refills: 0 | Status: DISCONTINUED | OUTPATIENT
Start: 2018-12-31 | End: 2019-01-01

## 2018-12-30 RX ORDER — DILTIAZEM HCL 120 MG
360 CAPSULE, EXT RELEASE 24 HR ORAL ONCE
Qty: 0 | Refills: 0 | Status: COMPLETED | OUTPATIENT
Start: 2018-12-30 | End: 2018-12-30

## 2018-12-30 RX ORDER — ATORVASTATIN CALCIUM 80 MG/1
80 TABLET, FILM COATED ORAL AT BEDTIME
Qty: 0 | Refills: 0 | Status: DISCONTINUED | OUTPATIENT
Start: 2018-12-30 | End: 2019-01-01

## 2018-12-30 RX ORDER — LISINOPRIL 2.5 MG/1
10 TABLET ORAL DAILY
Qty: 0 | Refills: 0 | Status: DISCONTINUED | OUTPATIENT
Start: 2018-12-30 | End: 2018-12-30

## 2018-12-30 RX ORDER — ASPIRIN/CALCIUM CARB/MAGNESIUM 324 MG
325 TABLET ORAL ONCE
Qty: 0 | Refills: 0 | Status: COMPLETED | OUTPATIENT
Start: 2018-12-30 | End: 2018-12-30

## 2018-12-30 RX ORDER — ENOXAPARIN SODIUM 100 MG/ML
40 INJECTION SUBCUTANEOUS EVERY 24 HOURS
Qty: 0 | Refills: 0 | Status: DISCONTINUED | OUTPATIENT
Start: 2018-12-30 | End: 2019-01-01

## 2018-12-30 RX ORDER — FAMOTIDINE 10 MG/ML
20 INJECTION INTRAVENOUS ONCE
Qty: 0 | Refills: 0 | Status: COMPLETED | OUTPATIENT
Start: 2018-12-30 | End: 2018-12-30

## 2018-12-30 RX ORDER — CHLORHEXIDINE GLUCONATE 213 G/1000ML
1 SOLUTION TOPICAL
Qty: 0 | Refills: 0 | Status: DISCONTINUED | OUTPATIENT
Start: 2018-12-30 | End: 2019-01-01

## 2018-12-30 RX ORDER — LISINOPRIL 2.5 MG/1
10 TABLET ORAL ONCE
Qty: 0 | Refills: 0 | Status: COMPLETED | OUTPATIENT
Start: 2018-12-30 | End: 2018-12-30

## 2018-12-30 RX ORDER — INSULIN LISPRO 100/ML
VIAL (ML) SUBCUTANEOUS
Qty: 0 | Refills: 0 | Status: DISCONTINUED | OUTPATIENT
Start: 2018-12-30 | End: 2019-01-01

## 2018-12-30 RX ORDER — DILTIAZEM HCL 120 MG
360 CAPSULE, EXT RELEASE 24 HR ORAL DAILY
Qty: 0 | Refills: 0 | Status: DISCONTINUED | OUTPATIENT
Start: 2018-12-31 | End: 2019-01-01

## 2018-12-30 RX ORDER — LEVOTHYROXINE SODIUM 125 MCG
125 TABLET ORAL ONCE
Qty: 0 | Refills: 0 | Status: COMPLETED | OUTPATIENT
Start: 2018-12-30 | End: 2018-12-30

## 2018-12-30 RX ORDER — LISINOPRIL 2.5 MG/1
20 TABLET ORAL DAILY
Qty: 0 | Refills: 0 | Status: DISCONTINUED | OUTPATIENT
Start: 2018-12-31 | End: 2019-01-01

## 2018-12-30 RX ORDER — DEXTROSE 50 % IN WATER 50 %
25 SYRINGE (ML) INTRAVENOUS ONCE
Qty: 0 | Refills: 0 | Status: DISCONTINUED | OUTPATIENT
Start: 2018-12-30 | End: 2019-01-01

## 2018-12-30 RX ORDER — INSULIN LISPRO 100/ML
10 VIAL (ML) SUBCUTANEOUS
Qty: 0 | Refills: 0 | Status: DISCONTINUED | OUTPATIENT
Start: 2018-12-30 | End: 2019-01-01

## 2018-12-30 RX ORDER — ASPIRIN/CALCIUM CARB/MAGNESIUM 324 MG
1 TABLET ORAL
Qty: 0 | Refills: 0 | COMMUNITY

## 2018-12-30 RX ORDER — PANTOPRAZOLE SODIUM 20 MG/1
40 TABLET, DELAYED RELEASE ORAL ONCE
Qty: 0 | Refills: 0 | Status: COMPLETED | OUTPATIENT
Start: 2018-12-30 | End: 2018-12-30

## 2018-12-30 RX ORDER — LABETALOL HCL 100 MG
20 TABLET ORAL ONCE
Qty: 0 | Refills: 0 | Status: DISCONTINUED | OUTPATIENT
Start: 2018-12-30 | End: 2018-12-30

## 2018-12-30 RX ORDER — INSULIN GLARGINE 100 [IU]/ML
25 INJECTION, SOLUTION SUBCUTANEOUS AT BEDTIME
Qty: 0 | Refills: 0 | Status: DISCONTINUED | OUTPATIENT
Start: 2018-12-30 | End: 2019-01-01

## 2018-12-30 RX ADMIN — Medication 8: at 18:59

## 2018-12-30 RX ADMIN — INSULIN GLARGINE 25 UNIT(S): 100 INJECTION, SOLUTION SUBCUTANEOUS at 22:17

## 2018-12-30 RX ADMIN — FAMOTIDINE 104 MILLIGRAM(S): 10 INJECTION INTRAVENOUS at 15:30

## 2018-12-30 RX ADMIN — LISINOPRIL 10 MILLIGRAM(S): 2.5 TABLET ORAL at 03:14

## 2018-12-30 RX ADMIN — Medication 325 MILLIGRAM(S): at 06:29

## 2018-12-30 RX ADMIN — Medication 125 MICROGRAM(S): at 06:29

## 2018-12-30 RX ADMIN — ATORVASTATIN CALCIUM 80 MILLIGRAM(S): 80 TABLET, FILM COATED ORAL at 22:17

## 2018-12-30 RX ADMIN — Medication 360 MILLIGRAM(S): at 06:29

## 2018-12-30 RX ADMIN — LISINOPRIL 10 MILLIGRAM(S): 2.5 TABLET ORAL at 20:21

## 2018-12-30 RX ADMIN — Medication 10 UNIT(S): at 18:59

## 2018-12-30 RX ADMIN — PANTOPRAZOLE SODIUM 40 MILLIGRAM(S): 20 TABLET, DELAYED RELEASE ORAL at 15:31

## 2018-12-30 NOTE — ED ADULT NURSE NOTE - OBJECTIVE STATEMENT
pt c/o cp x 3 days intermittently. Pt h/o TIA and aneurysm in the past. Denies n/v/d, denies fevers/chills.

## 2018-12-30 NOTE — H&P ADULT - ATTENDING COMMENTS
58y Male with a PMH of HTN, type II DM, HLD, CVA, AAA, Hypothyroidism, and RA who presented with a 2-week history of chest pain and on admission found to have abnormal stress test and ST abnormalities. Case d/w CV Medicine and will be added on to [facility] Schedule as an add on today. Continue with all care per orders (list below). Atypical CP rule out Coronary Artery Disease.      Vital Signs Last 24 Hrs  T(C): 36.7 (31 Dec 2018 07:48), Max: 36.9 (30 Dec 2018 19:49)  T(F): 98 (31 Dec 2018 07:48), Max: 98.5 (30 Dec 2018 19:49)  HR: 76 (31 Dec 2018 07:48) (76 - 81)  BP: 163/99 (31 Dec 2018 07:48) (137/74 - 163/99)  RR: 20 (31 Dec 2018 07:48) (19 - 20)  SpO2: 97% (31 Dec 2018 07:48) (97% - 97%)    PHYSICAL EXAM:  GENERAL: NAD, well-developed  HEAD:  Atraumatic, Normocephalic  EYES: EOMI, PERRLA, conjunctiva and sclera clear  NECK: Supple, No JVD  CHEST/LUNG: Clear to auscultation bilaterally; No wheeze  HEART: Regular rate and rhythm; No murmurs, rubs, or gallops  ABDOMEN: Soft, Nontender, Nondistended; Bowel sounds present  EXTREMITIES:  2+ Peripheral Pulses, No clubbing, cyanosis, or edema  PSYCH: AAOx3  NEUROLOGY: non-focal  SKIN: No rashes or lesions    MEDICATIONS  (STANDING):  aspirin 325 milliGRAM(s) Oral daily  atorvastatin 80 milliGRAM(s) Oral at bedtime  chlorhexidine 4% Liquid 1 Application(s) Topical <User Schedule>  dextrose 50% Injectable 25 Gram(s) IV Push once  diltiazem    milliGRAM(s) Oral daily  enoxaparin Injectable 40 milliGRAM(s) SubCutaneous every 24 hours  insulin glargine Injectable (LANTUS) 25 Unit(s) SubCutaneous at bedtime  insulin lispro (HumaLOG) corrective regimen sliding scale   SubCutaneous three times a day before meals  insulin lispro Injectable (HumaLOG) 10 Unit(s) SubCutaneous three times a day before meals  levothyroxine 125 MICROGram(s) Oral daily  lisinopril 20 milliGRAM(s) Oral daily  pantoprazole    Tablet 40 milliGRAM(s) Oral before breakfast    MEDICATIONS  (PRN):  calcium carbonate    500 mG (Tums) Chewable 1 Tablet(s) Chew four times a day PRN Dyspepsia  nitroglycerin     SubLingual 0.4 milliGRAM(s) SubLingual every 5 minutes PRN Chest Pain

## 2018-12-30 NOTE — ED PROVIDER NOTE - PHYSICAL EXAMINATION
CONSTITUTIONAL: Well-appearing; well-nourished; in no apparent distress.   EYES: PERRL; EOM intact.   CARDIOVASCULAR: Normal S1, S2; no murmurs, rubs, or gallops.   RESPIRATORY: Normal chest excursion with respiration; breath sounds clear and equal bilaterally; no wheezes, rhonchi, or rales.  GI/: Normal bowel sounds; non-distended; non-tender; no palpable organomegaly.   MS: No evidence of trauma or deformity. pulses are equal to all extremities.   SKIN: Normal for age and race; warm; dry; good turgor; no apparent lesions or exudate.   NEURO/PSYCH: A & O x 4; grossly unremarkable.

## 2018-12-30 NOTE — ED PROVIDER NOTE - PROGRESS NOTE DETAILS
I personally evaluated the patient. I reviewed the Physician Assistant Fellow's note and agree with the findings and plan. called answering service , states dr. pulliam does not admit to [facility]. will admit to hospitalist

## 2018-12-30 NOTE — H&P ADULT - NSHPPHYSICALEXAM_GEN_ALL_CORE
Vital Signs Last 24 Hrs  T(C): 36.2 (30 Dec 2018 15:52), Max: 36.9 (30 Dec 2018 07:30)  T(F): 97.2 (30 Dec 2018 15:52), Max: 98.5 (30 Dec 2018 07:30)  HR: 73 (30 Dec 2018 15:52) (65 - 97)  BP: 183/103 (30 Dec 2018 15:52) (133/83 - 212/129)  BP(mean): --  RR: 18 (30 Dec 2018 15:52) (18 - 18)  SpO2: 98% (30 Dec 2018 15:52) (98% - 99%)    POCT Blood Glucose.: 330 mg/dL (30 Dec 2018 06:59)    Physical Exam:    -     General :     -      HEENT:    -      Cardiac:    -      Pulm:    -      GI:    -      Musculoskeletal:    -      Neuro: Vital Signs Last 24 Hrs  T(C): 36.2 (30 Dec 2018 15:52), Max: 36.9 (30 Dec 2018 07:30)  T(F): 97.2 (30 Dec 2018 15:52), Max: 98.5 (30 Dec 2018 07:30)  HR: 73 (30 Dec 2018 15:52) (65 - 97)  BP: 183/103 (30 Dec 2018 15:52) (133/83 - 212/129)  BP(mean): --  RR: 18 (30 Dec 2018 15:52) (18 - 18)  SpO2: 98% (30 Dec 2018 15:52) (98% - 99%)    POCT Blood Glucose.: 330 mg/dL (30 Dec 2018 06:59)    Physical Exam:    -     General : Alert, awake and in no acute distress    -      HEENT: normocephalic     -      Cardiac: RRR, normal S1/S2    -      Pulm: CTA B/L    -      GI: abdomen soft, non-tender    -      Musculoskeletal: no lower extremity edema    -      Neuro: AAO x3

## 2018-12-30 NOTE — ED PROVIDER NOTE - OBJECTIVE STATEMENT
59 yo male hx of CVA/DM/HTN/abdominal aneurysm present c/o chest pressure radiating to back worsening at night off/on x 1 week. Felt pain associates with diaphoresis tonight so he comes to ED evaluation. denies SOB/weakness assoc wit the pain. denies exogenous hormone use/hx of DVT. denies recent illness/fever/chill/HA/dizziness/sob/abd pain/n/v/d/urinary sxs.

## 2018-12-30 NOTE — ED CDU PROVIDER INITIAL DAY NOTE - PROGRESS NOTE DETAILS
patient signed out from anat mosquera, no complaint will continue to monitor patient states ok , no chest pain, states feeling reflux after eating will try ppi, patient also states has appoitmnent for vascular dr. cartagnea 2/19, also states has appoitment with neurology dr. marie, states primary care and cardiology apoitment 1/4/19. will continue to monitor called dr. pulliam service awaiting call back, also placed  call page paging system . informed of patient result from nuclear stress test, informed of ed plan and management state ok . will continue to monitor

## 2018-12-30 NOTE — H&P ADULT - HISTORY OF PRESENT ILLNESS
The patient is a 58-year-old male with a PMH of HTN, type II DM, DLD, CVA, AAA, and RA who presented with The patient is a 58-year-old male with a PMH of HTN, type II DM, DLD, CVA, AAA, hypothyroidism, and RA who presented with a 2-week history of chest pain.  He describes the pain as midsternal, sharp/burning in nature, rated as 7/10 in intensity, intermittent (lasting 2-3 minutes at a time), and associated with radiation to the jaw and back.  He reports aggravation of the pain when laying down at night and some alleviation when sitting upright.  He has never experienced similar pain in the past.  He reports no association with food intake.  His symptoms progressed over the past 2 days which he described as the intensity becoming a "12 out of 10."  He also experienced diaphoresis so he presented to the ED for further evaluation.  To note, he was recently admitted for a stroke.  Imaging done at that time revealed an acute left pontine lacunar infarct.  His medications were optimized and he has since followed-up with his neurologist.  He was taking ASA 81 mg and Plavix 75 mg since discharge but was recently instructed to discontinue taking Plavix and start full-dose ASA.

## 2018-12-30 NOTE — ED CDU PROVIDER DISPOSITION NOTE - CLINICAL COURSE
Patient was sent to EDOU for further evaluation of atypical chest pains, has remained in no distress and with stable vitals, ekgs times two no evidence of ischemic changes, enzymes times two normal, Nuclear stress testing reveals a large reversable defects consistent with ischemia, MRA neck vertebral artery stenosis ( patient has history of recent stroke), Patient admitted to telemetry fo further cardiac evaluation, MAR aware of admission

## 2018-12-30 NOTE — ED PROVIDER NOTE - ATTENDING CONTRIBUTION TO CARE
57 yo m with pmh of basilar artery stenosis, cva, htn, hld, dm, aortic aneurysm 4.5 cm, presents with c/o left chest pain radiating to the back.  pt says has been having left chest pain at night x 1 week, but tonight persistent with radiation to the back.  pt presented with elevated bp.  denies headache, no abd pain, no leg pain or swelling.  no n/v/d, no fever or chills.  exam: nad, ncat, perrl, eomi, mmm, rrr, ctab, abd soft, nt,nd, obese, aox3, imp; pt with chest pain radiating to back, elevated bp, will r/o aortic dissection, troponin, ekg

## 2018-12-30 NOTE — ED PROVIDER NOTE - MEDICAL DECISION MAKING DETAILS
pt with multiple comorbidities, with chest pain, ekg and labs unremarkable in ED, will place in cardiac obs for nuc stress

## 2018-12-30 NOTE — H&P ADULT - ASSESSMENT
Assessment:  A 58y Male with a PMH of HTN, type II DM, DLD, CVA, AAA, and RA who presented with    Plan:  1. Chest pain  - troponin <0.01 x2  - CXR: no radiographic evidence of acute cardio-pulmonary disease  - CTA chest: no evidence of aortic dissection  - nuclear stress test: large reversible defect involving inferior and inferior septal walls of the LV extending to the apex consistent with ischemia  - 2D echo from 11/2018: normal LV systolic function      . HTN  -     . Type II DM  - Hgb A1c was 9.1% from November 2018    . DLD  -     . History of CVA  -     . AAA  - CTA chest: unchanged 4.5 cm infrarenal AAA    . Rheumatoid arthritis  -     . Pulmonary nodule  - CTA chest: RML 4 mm solid pulmonary nodule; follow-up CT chest may be obtained in 12 months to assess stability    . Renal hypodensity  - CTA chest: unchanged left lower pole 2.3 x 2.0 cm indeterminate hypodensity; outpatient MRI with contrast is recommended for further evaluation    . DVT and GI prophylaxis  -     . Disposition  - Assessment:  A 58y Male with a PMH of HTN, type II DM, DLD, CVA, AAA, hypothyroidism, and RA who presented with a 2-week history of chest pain.    Plan:  1. Chest pain r/o ACS  - admit to telemetry  - DEDE score is 3  - EKG: T-wave inversions in inferior leads which are not seen when comparing to last available EKG from 11/2018  - troponin <0.01 x2  - CXR: no radiographic evidence of acute cardio-pulmonary disease  - CTA chest: no evidence of aortic dissection  - nuclear stress test: large reversible defect involving inferior and inferior septal walls of the LV extending to the apex consistent with ischemia  - 2D echo from 11/2018: normal LV systolic function  - cardiology consult pending  - continue ASA, statin, and ACEI; NTG prn chest pain    2. HTN  - uncontrolled  - increase Lisinopril from 10 mg to 20 mg PO daily  - continue Diltiazem  mg PO daily  - monitor BP with optimal goal of <130/80 mm Hg    3. Type II DM  - Hgb A1c was 9.1% from November 2018  - hold oral hypoglycemic medications  - start insuline regimen and monitor FS    4. DLD  - continue Lipitor    5. History of CVA  - continue ASA and statin  - f/u with neurology as outpatient    6. AAA  - CTA chest: unchanged 4.5 cm infrarenal AAA  - f/u with vascular surgery as outpatient    7. Rheumatoid arthritis  - f/u with rheumatology as outpatient    8. Hypothyroidism  - continue Synthroid    9. Pulmonary nodule  - CTA chest: RML 4 mm solid pulmonary nodule; follow-up CT chest may be obtained in 12 months to assess stability    10. Renal hypodensity  - CTA chest: unchanged left lower pole 2.3 x 2.0 cm indeterminate hypodensity; outpatient MRI with contrast is recommended for further evaluation    11. DVT and GI prophylaxis  - LMWH and PPI    12. Disposition  - anticipate discharge home once medically stable Assessment:  A 58y Male with a PMH of HTN, type II DM, DLD, CVA, AAA, hypothyroidism, and RA who presented with a 2-week history of chest pain.    Plan:  1. Chest pain r/o ACS  - admit to telemetry  - DEDE score is 3  - EKG: T-wave inversions in inferior leads which are not seen when comparing to last available EKG from 11/2018  - troponin <0.01 x2  - CXR: no radiographic evidence of acute cardio-pulmonary disease  - CTA chest: no evidence of aortic dissection  - nuclear stress test: large reversible defect involving inferior and inferior septal walls of the LV extending to the apex consistent with ischemia  - 2D echo from 11/2018: normal LV systolic function  - cardiology consult pending  - continue ASA, statin, and ACEI; NTG prn chest pain    2. HTN  - uncontrolled  - increase Lisinopril from 10 mg to 20 mg PO daily  - continue Diltiazem  mg PO daily  - monitor BP; optimal goal is <130/80 mm Hg    3. Type II DM  - Hgb A1c was 9.1% from November 2018  - hold oral hypoglycemic medications  - start insulin regimen and monitor FS    4. DLD  - continue Lipitor    5. History of CVA  - continue ASA and statin  - f/u with neurology as outpatient    6. AAA  - CTA chest: unchanged 4.5 cm infrarenal AAA  - f/u with vascular surgery as outpatient    7. Rheumatoid arthritis  - f/u with rheumatology as outpatient    8. Hypothyroidism  - continue Synthroid  - TSH was 4.88 from November 2018    9. Pulmonary nodule  - CTA chest: right middle lobe 4 mm solid pulmonary nodule; follow-up CT chest may be obtained in 12 months to assess stability    10. Renal hypodensity  - CTA chest: unchanged left lower pole 2.3 x 2.0 cm indeterminate hypodensity; outpatient MRI with contrast is recommended for further evaluation    11. DVT and GI prophylaxis  - LMWH and PPI daily    12. Disposition  - anticipate discharge home once medically stable

## 2018-12-30 NOTE — ED CDU PROVIDER DISPOSITION NOTE - NS ED ATTENDING STATEMENT MOD
I have personally performed a face to face diagnostic evaluation on this patient. I have reviewed the ACP note and agree with the history, exam and plan of care, except as noted.
all other ROS negative except as per HPI

## 2018-12-30 NOTE — H&P ADULT - NSHPLABSRESULTS_GEN_ALL_CORE
Labs:                        15.6   9.50  )-----------( 320      ( 30 Dec 2018 01:28 )             45.5             12-30    135  |  94<L>  |  21<H>  ----------------------------<  353<H>  4.1   |  22  |  1.3    Ca    9.4      30 Dec 2018 01:28    TPro  7.3  /  Alb  4.2  /  TBili  0.3  /  DBili  x   /  AST  18  /  ALT  29  /  AlkPhos  130<H>  12-30    LIVER FUNCTIONS - ( 30 Dec 2018 01:28 )  Alb: 4.2 g/dL / Pro: 7.3 g/dL / ALK PHOS: 130 U/L / ALT: 29 U/L / AST: 18 U/L / GGT: x         PT/INR - ( 30 Dec 2018 01:28 )   PT: 10.70 sec;   INR: 0.93 ratio      PTT - ( 30 Dec 2018 01:28 )  PTT:33.9 sec  CARDIAC MARKERS ( 30 Dec 2018 06:41 )  x     / <0.01 ng/mL / x     / x     / x      CARDIAC MARKERS ( 30 Dec 2018 01:28 )  x     / <0.01 ng/mL / x     / x     / x        Urinalysis Basic - ( 30 Dec 2018 06:41 )  Color: Yellow / Appearance: Clear / SG: >=1.030 / pH: x  Gluc: x / Ketone: Negative  / Bili: Negative / Urobili: 0.2   Blood: x / Protein: Negative / Nitrite: Negative   Leuk Esterase: Negative / RBC: x / WBC x   Sq Epi: x / Non Sq Epi: x / Bacteria: x

## 2018-12-30 NOTE — ED CDU PROVIDER INITIAL DAY NOTE - OBJECTIVE STATEMENT
59y/o male with pmh of aortic aneurysm, dm, htn, hld, cva on asa 25mg, pt. c/o mid sternal cp for about 10 days. sx started when he began taking a statin. pt. has an appointment next week with his cardiologist dr. Asher. last stress test was about 5 years ago

## 2018-12-30 NOTE — H&P ADULT - FAMILY HISTORY
Father  Still living? No  Family history of stroke, Age at diagnosis: Age Unknown Father  Still living? No  Family history of stroke, Age at diagnosis: Age Unknown  Family history of CABG, Age at diagnosis: Age Unknown

## 2018-12-30 NOTE — ED ADULT TRIAGE NOTE - CHIEF COMPLAINT QUOTE
pt c/o cp x 3 days. pt stated approx 40 mins ago the pain became worse. pt complaining of midsternal cp. pt has hx of basilar art stenosis.

## 2018-12-31 ENCOUNTER — TRANSCRIPTION ENCOUNTER (OUTPATIENT)
Age: 58
End: 2018-12-31

## 2018-12-31 LAB
ANION GAP SERPL CALC-SCNC: 16 MMOL/L — HIGH (ref 7–14)
BLD GP AB SCN SERPL QL: SIGNIFICANT CHANGE UP
BUN SERPL-MCNC: 21 MG/DL — HIGH (ref 10–20)
CALCIUM SERPL-MCNC: 9.3 MG/DL — SIGNIFICANT CHANGE UP (ref 8.5–10.1)
CHLORIDE SERPL-SCNC: 98 MMOL/L — SIGNIFICANT CHANGE UP (ref 98–110)
CO2 SERPL-SCNC: 25 MMOL/L — SIGNIFICANT CHANGE UP (ref 17–32)
CREAT SERPL-MCNC: 1.1 MG/DL — SIGNIFICANT CHANGE UP (ref 0.7–1.5)
GLUCOSE BLDC GLUCOMTR-MCNC: 121 MG/DL — HIGH (ref 70–99)
GLUCOSE BLDC GLUCOMTR-MCNC: 158 MG/DL — HIGH (ref 70–99)
GLUCOSE BLDC GLUCOMTR-MCNC: 194 MG/DL — HIGH (ref 70–99)
GLUCOSE BLDC GLUCOMTR-MCNC: 276 MG/DL — HIGH (ref 70–99)
GLUCOSE SERPL-MCNC: 184 MG/DL — HIGH (ref 70–99)
MAGNESIUM SERPL-MCNC: 2.1 MG/DL — SIGNIFICANT CHANGE UP (ref 1.8–2.4)
POTASSIUM SERPL-MCNC: 4.4 MMOL/L — SIGNIFICANT CHANGE UP (ref 3.5–5)
POTASSIUM SERPL-SCNC: 4.4 MMOL/L — SIGNIFICANT CHANGE UP (ref 3.5–5)
SODIUM SERPL-SCNC: 139 MMOL/L — SIGNIFICANT CHANGE UP (ref 135–146)
TROPONIN T SERPL-MCNC: <0.01 NG/ML — SIGNIFICANT CHANGE UP
TYPE + AB SCN PNL BLD: SIGNIFICANT CHANGE UP

## 2018-12-31 RX ORDER — METFORMIN HYDROCHLORIDE 850 MG/1
1 TABLET ORAL
Qty: 0 | Refills: 0 | COMMUNITY

## 2018-12-31 RX ORDER — FAMOTIDINE 10 MG/ML
20 INJECTION INTRAVENOUS ONCE
Qty: 0 | Refills: 0 | Status: COMPLETED | OUTPATIENT
Start: 2018-12-31 | End: 2018-12-31

## 2018-12-31 RX ORDER — PANTOPRAZOLE SODIUM 20 MG/1
1 TABLET, DELAYED RELEASE ORAL
Qty: 30 | Refills: 0 | OUTPATIENT
Start: 2018-12-31 | End: 2019-01-29

## 2018-12-31 RX ORDER — FAMOTIDINE 10 MG/ML
1 INJECTION INTRAVENOUS
Qty: 30 | Refills: 0
Start: 2018-12-31 | End: 2019-01-29

## 2018-12-31 RX ORDER — CALCIUM CARBONATE 500(1250)
1 TABLET ORAL
Qty: 0 | Refills: 0 | Status: DISCONTINUED | OUTPATIENT
Start: 2018-12-31 | End: 2019-01-01

## 2018-12-31 RX ORDER — EPTIFIBATIDE 2 MG/ML
2 INJECTION, SOLUTION INTRAVENOUS
Qty: 75 | Refills: 0 | Status: DISCONTINUED | OUTPATIENT
Start: 2018-12-31 | End: 2019-01-01

## 2018-12-31 RX ORDER — TICAGRELOR 90 MG/1
90 TABLET ORAL
Qty: 0 | Refills: 0 | Status: DISCONTINUED | OUTPATIENT
Start: 2019-01-01 | End: 2019-01-01

## 2018-12-31 RX ORDER — SODIUM CHLORIDE 9 MG/ML
1000 INJECTION INTRAMUSCULAR; INTRAVENOUS; SUBCUTANEOUS
Qty: 0 | Refills: 0 | Status: DISCONTINUED | OUTPATIENT
Start: 2018-12-31 | End: 2019-01-01

## 2018-12-31 RX ADMIN — Medication 10 UNIT(S): at 09:05

## 2018-12-31 RX ADMIN — LISINOPRIL 20 MILLIGRAM(S): 2.5 TABLET ORAL at 06:04

## 2018-12-31 RX ADMIN — FAMOTIDINE 104 MILLIGRAM(S): 10 INJECTION INTRAVENOUS at 12:16

## 2018-12-31 RX ADMIN — INSULIN GLARGINE 25 UNIT(S): 100 INJECTION, SOLUTION SUBCUTANEOUS at 21:37

## 2018-12-31 RX ADMIN — ATORVASTATIN CALCIUM 80 MILLIGRAM(S): 80 TABLET, FILM COATED ORAL at 21:36

## 2018-12-31 RX ADMIN — Medication 360 MILLIGRAM(S): at 06:03

## 2018-12-31 RX ADMIN — ENOXAPARIN SODIUM 40 MILLIGRAM(S): 100 INJECTION SUBCUTANEOUS at 06:04

## 2018-12-31 RX ADMIN — PANTOPRAZOLE SODIUM 40 MILLIGRAM(S): 20 TABLET, DELAYED RELEASE ORAL at 06:06

## 2018-12-31 RX ADMIN — SODIUM CHLORIDE 100 MILLILITER(S): 9 INJECTION INTRAMUSCULAR; INTRAVENOUS; SUBCUTANEOUS at 19:59

## 2018-12-31 RX ADMIN — Medication 325 MILLIGRAM(S): at 11:35

## 2018-12-31 RX ADMIN — Medication 125 MICROGRAM(S): at 06:04

## 2018-12-31 RX ADMIN — EPTIFIBATIDE 21.41 MICROGRAM(S)/KG/MIN: 2 INJECTION, SOLUTION INTRAVENOUS at 19:59

## 2018-12-31 RX ADMIN — Medication 2: at 09:04

## 2018-12-31 RX ADMIN — Medication 6: at 13:02

## 2018-12-31 NOTE — CONSULT NOTE ADULT - ASSESSMENT
Atypical chest pain  + large defect inf/infroseptal wall  DM  Recent CVA, lacunar infarct no residual  AAA 4.7 cm  Basilar artery stenosis    NPO  Add on for cath today  ASA/BB/Statin  Risk factor modification

## 2018-12-31 NOTE — PROGRESS NOTE ADULT - SUBJECTIVE AND OBJECTIVE BOX
SUBJECTIVE:    Today is hospital day 1d.   OVERNIGHT EVENTS: none  Today, patient is doing well.  no complaints.  Wants to go home. NPO for now. Plan for cath. if cath negative, pt will be d/c'ed      MEDICATIONS:  STANDING MEDICATIONS  aspirin 325 milliGRAM(s) Oral daily  atorvastatin 80 milliGRAM(s) Oral at bedtime  chlorhexidine 4% Liquid 1 Application(s) Topical <User Schedule>  dextrose 50% Injectable 25 Gram(s) IV Push once  diltiazem    milliGRAM(s) Oral daily  enoxaparin Injectable 40 milliGRAM(s) SubCutaneous every 24 hours  insulin glargine Injectable (LANTUS) 25 Unit(s) SubCutaneous at bedtime  insulin lispro (HumaLOG) corrective regimen sliding scale   SubCutaneous three times a day before meals  insulin lispro Injectable (HumaLOG) 10 Unit(s) SubCutaneous three times a day before meals  levothyroxine 125 MICROGram(s) Oral daily  lisinopril 20 milliGRAM(s) Oral daily  pantoprazole    Tablet 40 milliGRAM(s) Oral before breakfast    PRN MEDICATIONS  calcium carbonate    500 mG (Tums) Chewable 1 Tablet(s) Chew four times a day PRN  nitroglycerin     SubLingual 0.4 milliGRAM(s) SubLingual every 5 minutes PRN    VITALS:   T(F): 98  HR: 76  BP: 163/99  RR: 20  SpO2: 97%          LABS:                        15.6   9.50  )-----------( 320      ( 30 Dec 2018 01:28 )             45.5     12-31    139  |  98  |  21<H>  ----------------------------<  184<H>  4.4   |  25  |  1.1    Ca    9.3      31 Dec 2018 08:00  Mg     2.1     12-31    TPro  7.3  /  Alb  4.2  /  TBili  0.3  /  DBili  x   /  AST  18  /  ALT  29  /  AlkPhos  130<H>  12-30    PT/INR - ( 30 Dec 2018 01:28 )   PT: 10.70 sec;   INR: 0.93 ratio         PTT - ( 30 Dec 2018 01:28 )  PTT:33.9 sec  Urinalysis Basic - ( 30 Dec 2018 06:41 )    Color: Yellow / Appearance: Clear / SG: >=1.030 / pH: x  Gluc: x / Ketone: Negative  / Bili: Negative / Urobili: 0.2   Blood: x / Protein: Negative / Nitrite: Negative   Leuk Esterase: Negative / RBC: x / WBC x   Sq Epi: x / Non Sq Epi: x / Bacteria: x        Troponin T, Serum: <0.01 ng/mL (12-31-18 @ 08:00)      CARDIAC MARKERS ( 31 Dec 2018 08:00 )  x     / <0.01 ng/mL / x     / x     / x      CARDIAC MARKERS ( 30 Dec 2018 06:41 )  x     / <0.01 ng/mL / x     / x     / x      CARDIAC MARKERS ( 30 Dec 2018 01:28 )  x     / <0.01 ng/mL / x     / x     / x          RADIOLOGY:    PHYSICAL EXAM:  GEN: awake, alert  HEAD:  Atraumatic, normocephaic  Eyes:  EOMI, Sclera white  LUNGS: ctab  HEART: RRR, S1 and S2  ABD: soft non tender non distended  EXT: no edema  NEURO: oriented x 4, non focal

## 2018-12-31 NOTE — DISCHARGE NOTE ADULT - PATIENT PORTAL LINK FT
You can access the [facility] Portal, offered by [facility], by registering with the following website: [email or web address]

## 2018-12-31 NOTE — DISCHARGE NOTE ADULT - MEDICATION SUMMARY - MEDICATIONS TO TAKE
I will START or STAY ON the medications listed below when I get home from the hospital:    aspirin 325 mg oral tablet  -- 1 tab(s) by mouth once a day  -- Indication: For Stroke    ramipril 10 mg oral capsule  -- 1 cap(s) by mouth once a day  -- Indication: For Hypertension    dilTIAZem 360 mg/24 hours oral tablet, extended release  -- 1 tab(s) by mouth once a day  -- Indication: For Hypertension    metFORMIN 500 mg oral tablet  -- 1 tab(s) by mouth 3 times a day  DO NOT TAKE UNTIL 1/2/2019  -- Indication: For Diabetes- do not take till 1/2/2019    atorvastatin 80 mg oral tablet  -- 1 tab(s) by mouth once a day (at bedtime)  -- Indication: For Stroke    famotidine 40 mg oral tablet  -- 1 tab(s) by mouth once a day (at bedtime)   -- It is very important that you take or use this exactly as directed.  Do not skip doses or discontinue unless directed by your doctor.  Obtain medical advice before taking any non-prescription drugs as some may affect the action of this medication.    -- Indication: For Reflux    Protonix 40 mg oral delayed release tablet  -- 1 tab(s) by mouth once a day   -- It is very important that you take or use this exactly as directed.  Do not skip doses or discontinue unless directed by your doctor.  Obtain medical advice before taking any non-prescription drugs as some may affect the action of this medication.  Swallow whole.  Do not crush.    -- Indication: For Reflux    levothyroxine 125 mcg (0.125 mg) oral capsule  -- 1 cap(s) by mouth once a day  -- Indication: For Hypothyroidism I will START or STAY ON the medications listed below when I get home from the hospital:    aspirin 325 mg oral tablet  -- 1 tab(s) by mouth once a day  -- Indication: For Stroke    ramipril 10 mg oral capsule  -- 1 cap(s) by mouth once a day  -- Indication: For Hypertension    dilTIAZem 360 mg/24 hours oral tablet, extended release  -- 1 tab(s) by mouth once a day  -- Indication: For Hypertension    metFORMIN 500 mg oral tablet  -- 1 tab(s) by mouth 3 times a day  DO NOT TAKE UNTIL 1/2/2019  -- Indication: For Diabetes- do not take till 1/2/2019    atorvastatin 80 mg oral tablet  -- 1 tab(s) by mouth once a day (at bedtime)  -- Indication: For Stroke    ticagrelor 90 mg oral tablet  -- 1 tab(s) by mouth 2 times a day MDD:2  -- It is very important that you take or use this exactly as directed.  Do not skip doses or discontinue unless directed by your doctor.  Obtain medical advice before taking any non-prescription drugs as some may affect the action of this medication.    -- Indication: For ACS    famotidine 40 mg oral tablet  -- 1 tab(s) by mouth once a day (at bedtime)   -- It is very important that you take or use this exactly as directed.  Do not skip doses or discontinue unless directed by your doctor.  Obtain medical advice before taking any non-prescription drugs as some may affect the action of this medication.    -- Indication: For Reflux    Protonix 40 mg oral delayed release tablet  -- 1 tab(s) by mouth once a day   -- It is very important that you take or use this exactly as directed.  Do not skip doses or discontinue unless directed by your doctor.  Obtain medical advice before taking any non-prescription drugs as some may affect the action of this medication.  Swallow whole.  Do not crush.    -- Indication: For Reflux    levothyroxine 125 mcg (0.125 mg) oral capsule  -- 1 cap(s) by mouth once a day  -- Indication: For Hypothyroidism I will START or STAY ON the medications listed below when I get home from the hospital:    Adult Aspirin 81 mg oral tablet, chewable  -- 1 tab(s) chewed once a day   -- Chew tablets before swallowing  Take with food or milk.    -- Indication: For Abnormal stress test    ramipril 10 mg oral capsule  -- 1 cap(s) by mouth once a day  -- Indication: For Hypertension    dilTIAZem 360 mg/24 hours oral tablet, extended release  -- 1 tab(s) by mouth once a day  -- Indication: For Hypertension    metFORMIN 500 mg oral tablet  -- 1 tab(s) by mouth 3 times a day  DO NOT TAKE UNTIL 1/2/2019  -- Indication: For Diabetes- do not take till 1/2/2019    atorvastatin 80 mg oral tablet  -- 1 tab(s) by mouth once a day (at bedtime)  -- Indication: For Stroke    ticagrelor 90 mg oral tablet  -- 1 tab(s) by mouth 2 times a day MDD:2  -- It is very important that you take or use this exactly as directed.  Do not skip doses or discontinue unless directed by your doctor.  Obtain medical advice before taking any non-prescription drugs as some may affect the action of this medication.    -- Indication: For ACS    famotidine 40 mg oral tablet  -- 1 tab(s) by mouth once a day (at bedtime)   -- It is very important that you take or use this exactly as directed.  Do not skip doses or discontinue unless directed by your doctor.  Obtain medical advice before taking any non-prescription drugs as some may affect the action of this medication.    -- Indication: For Reflux    Protonix 40 mg oral delayed release tablet  -- 1 tab(s) by mouth once a day   -- It is very important that you take or use this exactly as directed.  Do not skip doses or discontinue unless directed by your doctor.  Obtain medical advice before taking any non-prescription drugs as some may affect the action of this medication.  Swallow whole.  Do not crush.    -- Indication: For Reflux    levothyroxine 125 mcg (0.125 mg) oral capsule  -- 1 cap(s) by mouth once a day  -- Indication: For Hypothyroidism

## 2018-12-31 NOTE — DISCHARGE NOTE ADULT - PLAN OF CARE
Resolution Please take the medications as prescribed.  Please follow up with Dr. Hoyt - primary medical doctor.  Please do not take metformin until 1/2/2019 as you cannot take it for 48 hours after having a catheterization procedure. Resolution of symptoms

## 2018-12-31 NOTE — PROGRESS NOTE ADULT - ASSESSMENT
1. Chest pain r/o ACS  - EKG: T-wave inversions in inferior leads which are not seen when comparing to last available EKG from 11/2018  - troponin <0.01 x3  - CXR: no radiographic evidence of acute cardio-pulmonary disease  - CTA chest: no evidence of aortic dissection  - nuclear stress test: large reversible defect involving inferior and inferior septal walls of the LV extending to the apex consistent with ischemia  - 2D echo from 11/2018: normal LV systolic function  - continue ASA, statin, and ACEI; NTG prn chest pain  -NPO, plan for cath today    2. HTN  - increase Lisinopril from 10 mg to 20 mg PO daily  - continue Diltiazem  mg PO daily  - monitor BP; optimal goal is <130/80 mm Hg    3. Type II DM  - Hgb A1c was 9.1% from November 2018  - hold oral hypoglycemic medications  - start insulin regimen and monitor FS    4. DLD  - continue Lipitor    5. History of CVA  - continue ASA and statin  - f/u with neurology as outpatient    6. AAA  - CTA chest: unchanged 4.5 cm infrarenal AAA  - f/u with vascular surgery as outpatient    7. Rheumatoid arthritis  - f/u with rheumatology as outpatient    8. Hypothyroidism  - continue Synthroid  - TSH was 4.88 from November 2018    9. Pulmonary nodule  - CTA chest: right middle lobe 4 mm solid pulmonary nodule; follow-up CT chest may be obtained in 12 months to assess stability    10. Renal hypodensity  - CTA chest: unchanged left lower pole 2.3 x 2.0 cm indeterminate hypodensity; outpatient MRI with contrast is recommended for further evaluation    11. DVT and GI prophylaxis  - LMWH and PPI daily    12. Disposition  - anticipate discharge home once medically stable

## 2018-12-31 NOTE — CONSULT NOTE ADULT - ATTENDING COMMENTS
Pt seen and examined in ED3, Multiple risk factersor CAD with atypical chest pain but some new T wave changes on EKG and abnormal nuckaer stress test. Pt to have cardiac catheterization possibly later today. Lipid profile on lipitor needed.

## 2018-12-31 NOTE — DISCHARGE NOTE ADULT - CARE PROVIDER_API CALL
Steven Hoyt), Geriatric Medicine; Internal Medicine  [address] [place] [address]  Phone: [phone]  Fax: [phone] Steven Hoyt), Geriatric Medicine; Internal Medicine  [address] [place] [address]  Phone: [phone]  Fax: [phone]    Montrell Asher), Cardiology; Internal Medicine  [address] [place] [address]  Phone: [phone]  Fax: [phone]

## 2018-12-31 NOTE — CHART NOTE - NSCHARTNOTEFT_GEN_A_CORE
PRE-OP DIAGNOSIS: suspected CAD    PROCEDURE: LHC, PCI    Physician: Dr. Packer   Assistant: Dr. Fernando    ANESTHESIA TYPE:  [  ]General Anesthesia  [  x] Sedation  [  x] Local/Regional    ESTIMATED BLOOD LOSS:   < 10 mL    CONDITION  [  ] Critical  [  ] Serious  [  ]Fair  [  x]Good    ACCESS  [  x] Right radial   [  ] Right femoral       FINDINGS    Left main -normal     LAD:  mild atherosclerosis                    Diag: mild atherosclerosis    Left Circumflex: mild atherosclerosis   OM: mild atherosclerosis    Right Coronary Artery: mid RCA 99 % stenosis       INTERVENTION/ IMPLANTS: aspiration thrombectomy, ANTONIO to mid RCA    POST-OP DIAGNOSIS    significant 1 vessel CAD  PCI of mid RCA      PLAN OF CARE  [ ] D/C Home today  [ ] D/C in AM  [ x] Return to In-patient bed  [ ] Admit for observation  [ ] Return for staged procedure  [ ] CT Surgery consult called  [ x] Continue DAPT, B-blocker & Statin therapy PRE-OP DIAGNOSIS: suspected CAD    PROCEDURE: LHC, PCI    Physician: Dr. Packer   Assistant: Dr. Fernando    ANESTHESIA TYPE:  [  ]General Anesthesia  [  x] Sedation  [  x] Local/Regional    ESTIMATED BLOOD LOSS:   < 10 mL    CONDITION  [  ] Critical  [  ] Serious  [  ]Fair  [  x]Good    ACCESS  [  x] Right radial   [  ] Right femoral       FINDINGS    Left main -normal     LAD:  mild atherosclerosis                    Diag: mild atherosclerosis    Left Circumflex: mild atherosclerosis   OM: mild atherosclerosis    Right Coronary Artery: mid RCA 99 % stenosis       INTERVENTION/ IMPLANTS: aspiration thrombectomy, ANTONIO to mid RCA    POST-OP DIAGNOSIS    significant 1 vessel CAD  PCI of mid RCA      PLAN OF CARE  [ ] D/C Home today  [ ] D/C in AM  [ x] Return to In-patient bed  [ ] Admit for observation  [ ] Return for staged procedure  [ ] CT Surgery consult called  [ x] Continue DAPT, Statin therapy

## 2018-12-31 NOTE — DISCHARGE NOTE ADULT - HOSPITAL COURSE
58-year-old male with a PMH of HTN, type II DM, DLD, CVA, AAA, hypothyroidism, and RA who presented with a 2-week history of chest pain.  He describes the pain as midsternal, sharp/burning in nature, rated as 7/10 in intensity, intermittent (lasting 2-3 minutes at a time), and associated with radiation to the jaw and back. He also experienced diaphoresis.  Pt recently admitted for a stroke.  Imaging done at that time revealed an acute left pontine lacunar infarct.  He was taking ASA 81 mg and Plavix 75 mg since discharge but was recently instructed to discontinue taking Plavix and start full-dose ASA.    Patient planned for cath on 12/31.  Pending cath, patient stable for discharge.  Patient also complaining of reflux.  Sent famotidine and protonix to pharmacy and he knows to follow up with a primary care doctor and a gastroenterologist.  MED REC MAY NEED TO BE REVISED AS CATH WILL BE PERFORMED LATER TODAY AND UNSURE OF THE RESULTS.

## 2018-12-31 NOTE — CONSULT NOTE ADULT - SUBJECTIVE AND OBJECTIVE BOX
Chief complaint:  Chest Pain    HPI:  The patient is a 58-year-old male with a PMH of HTN, type II DM, DLD, CVA, AAA, hypothyroidism, and RA who presented with a 2-week history of chest pain.  He describes the pain as midsternal, sharp/burning in nature, rated as 7/10 in intensity, intermittent (lasting 2-3 minutes at a time), and associated with radiation to the jaw and back.  He reports aggravation of the pain when laying down at night and some alleviation when sitting upright.  He has never experienced similar pain in the past.  He reports no association with food intake.  His symptoms progressed over the past 2 days which he described as the intensity becoming a "12 out of 10."  He also experienced diaphoresis so he presented to the ED for further evaluation.  To note, he was recently admitted for a stroke.  Imaging done at that time revealed an acute left pontine lacunar infarct.  His medications were optimized and he has since followed-up with his neurologist.  He was taking ASA 81 mg and Plavix 75 mg since discharge but was recently instructed to discontinue taking Plavix and start full-dose ASA. (30 Dec 2018 17:00)      ROS:  Constitutional: No fever, chill, sweats  Eye: No recent visual problem  ENMT: No ear pain, nasal congestion, throat pain  Respiratoty: No SOB, cough  Cardiovascular: No chest pain, palpitaion, syncope  Gastrointestinal: No nausea, vomitting, diarhea  Genitourinary: No dysuria, hematuria  Heam/Lymp: No brusing tendency, no swollen glands  Endocrine: Negative for excessive hunger, thirst  Musculoskeletal: No neck pain, back pain, joint pain  Intergumentory: No rash, skin lesions  Neurologic: alert and oriented    PAST MEDICAL & SURGICAL HISTORY  Skull fracture  Rheumatoid arthritis  Thyroid disease  Aortic aneurysm  Diabetes  HTN (hypertension)  History of hand surgery  History of appendectomy      FAMILY HISTORY:  FAMILY HISTORY:  Family history of CABG (Father)  Family history of stroke (Father)      SOCIAL HISTORY:  []smoker  []Alcohol  []Drug    ALLERGIES:  penicillin (Unknown)      MEDICATIONS:  MEDICATIONS  (STANDING):  aspirin 325 milliGRAM(s) Oral daily  atorvastatin 80 milliGRAM(s) Oral at bedtime  chlorhexidine 4% Liquid 1 Application(s) Topical <User Schedule>  dextrose 50% Injectable 25 Gram(s) IV Push once  diltiazem    milliGRAM(s) Oral daily  enoxaparin Injectable 40 milliGRAM(s) SubCutaneous every 24 hours  insulin glargine Injectable (LANTUS) 25 Unit(s) SubCutaneous at bedtime  insulin lispro (HumaLOG) corrective regimen sliding scale   SubCutaneous three times a day before meals  insulin lispro Injectable (HumaLOG) 10 Unit(s) SubCutaneous three times a day before meals  levothyroxine 125 MICROGram(s) Oral daily  lisinopril 20 milliGRAM(s) Oral daily  pantoprazole    Tablet 40 milliGRAM(s) Oral before breakfast    MEDICATIONS  (PRN):  calcium carbonate    500 mG (Tums) Chewable 1 Tablet(s) Chew four times a day PRN Dyspepsia  nitroglycerin     SubLingual 0.4 milliGRAM(s) SubLingual every 5 minutes PRN Chest Pain      HOME MEDICATIONS:  Home Medications:  aspirin 325 mg oral tablet: 1 tab(s) orally once a day (30 Dec 2018 17:55)  dilTIAZem 360 mg/24 hours oral tablet, extended release: 1 tab(s) orally once a day (30 Dec 2018 17:55)  levothyroxine 125 mcg (0.125 mg) oral capsule: 1 cap(s) orally once a day (30 Dec 2018 17:55)  metFORMIN 500 mg oral tablet: 1 tab(s) orally 3 times a day  DO NOT TAKE UNTIL 1/2/2019 (31 Dec 2018 13:09)  ramipril 10 mg oral capsule: 1 cap(s) orally once a day (30 Dec 2018 17:55)      VITALS:   T(F): 98 (12-31 @ 07:48), Max: 98.5 (12-30 @ 07:30)  HR: 76 (12-31 @ 07:48) (65 - 97)  BP: 163/99 (12-31 @ 07:48) (133/83 - 212/129)  BP(mean): --  RR: 20 (12-31 @ 07:48) (18 - 20)  SpO2: 97% (12-31 @ 07:48) (97% - 99%)    I&O's Summary      PHYSICAL EXAM:  GEN: Alert and oriented X 3, Well nourished, No acute distress  NECK: Supple, non tender, NO JVD, No carotid bruit,   LUNGS: Clear to auscultation bilaterally, non labored respiration  CARDIOVASCULAR: S1/S2 present, RRR , no murmus or rubs, + PP bilaterally  ABD: Soft, non-tender, non-distended,   EXT: No Lower extreimity edema, no tenderness  NEURO: Non focal  SKIN: Intact    LABS:                        15.6   9.50  )-----------( 320      ( 30 Dec 2018 01:28 )             45.5     12-31    139  |  98  |  21<H>  ----------------------------<  184<H>  4.4   |  25  |  1.1    Ca    9.3      31 Dec 2018 08:00  Mg     2.1     12-31    TPro  7.3  /  Alb  4.2  /  TBili  0.3  /  DBili  x   /  AST  18  /  ALT  29  /  AlkPhos  130<H>  12-30    PT/INR - ( 30 Dec 2018 01:28 )   PT: 10.70 sec;   INR: 0.93 ratio         PTT - ( 30 Dec 2018 01:28 )  PTT:33.9 sec  Troponin T, Serum: <0.01 ng/mL (12-31-18 @ 08:00)    CARDIAC MARKERS ( 31 Dec 2018 08:00 )  x     / <0.01 ng/mL / x     / x     / x      CARDIAC MARKERS ( 30 Dec 2018 06:41 )  x     / <0.01 ng/mL / x     / x     / x      CARDIAC MARKERS ( 30 Dec 2018 01:28 )  x     / <0.01 ng/mL / x     / x     / x        RADIOLOGY:  -CXR:    < from: Xray Chest 1 View AP/PA (12.30.18 @ 02:05) >  Impression:      No radiographic evidence of acute cardiopulmonarydisease.    < end of copied text >    -TTE:    < from: Transthoracic Echocardiogram (11.26.18 @ 17:27) >  Summary:   1. Normal global left ventricular systolic function.   2. Mildly increased LV wall thickness.   3. Mild mitral annular calcification.   4. Trace tricuspid regurgitation.   5. Dilatation of the aortic root.    < end of copied text >    -STRESS TEST:    < from: NM Nuclear Stress Pharmacologic Multiple (12.30.18 @ 14:38) >  Impression:     1. LARGE REVERSIBLE DEFECT INVOLVING INFERIOR AND INFERIOR SEPTAL WALLS   OF THE LEFT VENTRICLE EXTENDING TO THE APEX CONSISTENT WITH ISCHEMIA.    2. NORMAL RESTING LEFT VENTRICULAR WALL MOTION AND WALL THICKENING.    3. LEFT VENTRICULAR EJECTION FRACTIONOF  57 % WHICH IS WITHIN RANGE OF   NORMAL.    < end of copied text >      ECG:  < from: 12 Lead ECG (12.30.18 @ 06:17) >  Diagnosis Line Normal sinus rhythm  T wave abnormality, consider inferior ischemia  Abnormal ECG    < end of copied text >

## 2019-01-01 VITALS
SYSTOLIC BLOOD PRESSURE: 150 MMHG | OXYGEN SATURATION: 97 % | HEART RATE: 88 BPM | TEMPERATURE: 98 F | DIASTOLIC BLOOD PRESSURE: 95 MMHG | RESPIRATION RATE: 23 BRPM

## 2019-01-01 LAB
ALBUMIN SERPL ELPH-MCNC: 3.7 G/DL — SIGNIFICANT CHANGE UP (ref 3.5–5.2)
ALP SERPL-CCNC: 94 U/L — SIGNIFICANT CHANGE UP (ref 30–115)
ALT FLD-CCNC: 28 U/L — SIGNIFICANT CHANGE UP (ref 0–41)
ANION GAP SERPL CALC-SCNC: 17 MMOL/L — HIGH (ref 7–14)
APTT BLD: 31.2 SEC — SIGNIFICANT CHANGE UP (ref 27–39.2)
AST SERPL-CCNC: 19 U/L — SIGNIFICANT CHANGE UP (ref 0–41)
BASOPHILS # BLD AUTO: 0.04 K/UL — SIGNIFICANT CHANGE UP (ref 0–0.2)
BASOPHILS NFR BLD AUTO: 0.3 % — SIGNIFICANT CHANGE UP (ref 0–1)
BILIRUB SERPL-MCNC: 0.4 MG/DL — SIGNIFICANT CHANGE UP (ref 0.2–1.2)
BUN SERPL-MCNC: 20 MG/DL — SIGNIFICANT CHANGE UP (ref 10–20)
CALCIUM SERPL-MCNC: 9.4 MG/DL — SIGNIFICANT CHANGE UP (ref 8.5–10.1)
CHLORIDE SERPL-SCNC: 99 MMOL/L — SIGNIFICANT CHANGE UP (ref 98–110)
CO2 SERPL-SCNC: 22 MMOL/L — SIGNIFICANT CHANGE UP (ref 17–32)
CREAT SERPL-MCNC: 1.2 MG/DL — SIGNIFICANT CHANGE UP (ref 0.7–1.5)
EOSINOPHIL # BLD AUTO: 0.16 K/UL — SIGNIFICANT CHANGE UP (ref 0–0.7)
EOSINOPHIL NFR BLD AUTO: 1.3 % — SIGNIFICANT CHANGE UP (ref 0–8)
GLUCOSE BLDC GLUCOMTR-MCNC: 190 MG/DL — HIGH (ref 70–99)
GLUCOSE BLDC GLUCOMTR-MCNC: 236 MG/DL — HIGH (ref 70–99)
GLUCOSE BLDC GLUCOMTR-MCNC: 239 MG/DL — HIGH (ref 70–99)
GLUCOSE SERPL-MCNC: 222 MG/DL — HIGH (ref 70–99)
HCT VFR BLD CALC: 43.3 % — SIGNIFICANT CHANGE UP (ref 42–52)
HGB BLD-MCNC: 15 G/DL — SIGNIFICANT CHANGE UP (ref 14–18)
IMM GRANULOCYTES NFR BLD AUTO: 0.6 % — HIGH (ref 0.1–0.3)
INR BLD: 1.05 RATIO — SIGNIFICANT CHANGE UP (ref 0.65–1.3)
LYMPHOCYTES # BLD AUTO: 17.6 % — LOW (ref 20.5–51.1)
LYMPHOCYTES # BLD AUTO: 2.23 K/UL — SIGNIFICANT CHANGE UP (ref 1.2–3.4)
MAGNESIUM SERPL-MCNC: 2 MG/DL — SIGNIFICANT CHANGE UP (ref 1.8–2.4)
MCHC RBC-ENTMCNC: 30.4 PG — SIGNIFICANT CHANGE UP (ref 27–31)
MCHC RBC-ENTMCNC: 34.6 G/DL — SIGNIFICANT CHANGE UP (ref 32–37)
MCV RBC AUTO: 87.8 FL — SIGNIFICANT CHANGE UP (ref 80–94)
MONOCYTES # BLD AUTO: 0.89 K/UL — HIGH (ref 0.1–0.6)
MONOCYTES NFR BLD AUTO: 7 % — SIGNIFICANT CHANGE UP (ref 1.7–9.3)
NEUTROPHILS # BLD AUTO: 9.29 K/UL — HIGH (ref 1.4–6.5)
NEUTROPHILS NFR BLD AUTO: 73.2 % — SIGNIFICANT CHANGE UP (ref 42.2–75.2)
NRBC # BLD: 0 /100 WBCS — SIGNIFICANT CHANGE UP (ref 0–0)
PLATELET # BLD AUTO: 305 K/UL — SIGNIFICANT CHANGE UP (ref 130–400)
POTASSIUM SERPL-MCNC: 4.3 MMOL/L — SIGNIFICANT CHANGE UP (ref 3.5–5)
POTASSIUM SERPL-SCNC: 4.3 MMOL/L — SIGNIFICANT CHANGE UP (ref 3.5–5)
PROT SERPL-MCNC: 6.8 G/DL — SIGNIFICANT CHANGE UP (ref 6–8)
PROTHROM AB SERPL-ACNC: 12.1 SEC — SIGNIFICANT CHANGE UP (ref 9.95–12.87)
RBC # BLD: 4.93 M/UL — SIGNIFICANT CHANGE UP (ref 4.7–6.1)
RBC # FLD: 12.4 % — SIGNIFICANT CHANGE UP (ref 11.5–14.5)
SODIUM SERPL-SCNC: 138 MMOL/L — SIGNIFICANT CHANGE UP (ref 135–146)
WBC # BLD: 12.68 K/UL — HIGH (ref 4.8–10.8)
WBC # FLD AUTO: 12.68 K/UL — HIGH (ref 4.8–10.8)

## 2019-01-01 RX ORDER — TICAGRELOR 90 MG/1
1 TABLET ORAL
Qty: 60 | Refills: 0
Start: 2019-01-01 | End: 2019-01-30

## 2019-01-01 RX ORDER — ASPIRIN/CALCIUM CARB/MAGNESIUM 324 MG
81 TABLET ORAL DAILY
Qty: 0 | Refills: 0 | Status: DISCONTINUED | OUTPATIENT
Start: 2019-01-01 | End: 2019-01-01

## 2019-01-01 RX ORDER — ASPIRIN/CALCIUM CARB/MAGNESIUM 324 MG
1 TABLET ORAL
Qty: 0 | Refills: 0 | COMMUNITY

## 2019-01-01 RX ORDER — ASPIRIN/CALCIUM CARB/MAGNESIUM 324 MG
1 TABLET ORAL
Qty: 30 | Refills: 0
Start: 2019-01-01 | End: 2019-01-30

## 2019-01-01 RX ADMIN — Medication 81 MILLIGRAM(S): at 11:54

## 2019-01-01 RX ADMIN — PANTOPRAZOLE SODIUM 40 MILLIGRAM(S): 20 TABLET, DELAYED RELEASE ORAL at 05:37

## 2019-01-01 RX ADMIN — Medication 10 UNIT(S): at 08:26

## 2019-01-01 RX ADMIN — Medication 360 MILLIGRAM(S): at 05:36

## 2019-01-01 RX ADMIN — LISINOPRIL 20 MILLIGRAM(S): 2.5 TABLET ORAL at 05:36

## 2019-01-01 RX ADMIN — Medication 125 MICROGRAM(S): at 05:36

## 2019-01-01 RX ADMIN — Medication 2: at 08:27

## 2019-01-01 RX ADMIN — TICAGRELOR 90 MILLIGRAM(S): 90 TABLET ORAL at 05:37

## 2019-01-01 NOTE — PROGRESS NOTE ADULT - SUBJECTIVE AND OBJECTIVE BOX
SUBJECTIVE:  58-year-old male with a PMH of HTN, type II DM, DLD, CVA, AAA, hypothyroidism, and RA who presented with a 2-week history of chest pain.  He describes the pain as midsternal, sharp/burning in nature, rated as 7/10 in intensity, intermittent (lasting 2-3 minutes at a time), and associated with radiation to the jaw and back. He also experienced diaphoresis.  Pt recently admitted for a stroke.  Imaging done at that time revealed an acute left pontine lacunar infarct.  He was taking ASA 81 mg and Plavix 75 mg since discharge but was recently instructed to discontinue taking Plavix and start full-dose ASA.    Patient planned for cath on 12/31. Plan was subsequently to discharge patient home, Cath was + for PCI of mid RCA, upgraded to CCU.    MEDICATIONS:  MEDICATIONS  (STANDING):  aspirin 325 milliGRAM(s) Oral daily  atorvastatin 80 milliGRAM(s) Oral at bedtime  chlorhexidine 4% Liquid 1 Application(s) Topical <User Schedule>  dextrose 50% Injectable 25 Gram(s) IV Push once  diltiazem    milliGRAM(s) Oral daily  enoxaparin Injectable 40 milliGRAM(s) SubCutaneous every 24 hours  eptifibatide Infusion 75 mg/100 mL 2 MICROgram(s)/kG/Min (21.408 mL/Hr) IV Continuous <Continuous>  insulin glargine Injectable (LANTUS) 25 Unit(s) SubCutaneous at bedtime  insulin lispro (HumaLOG) corrective regimen sliding scale   SubCutaneous three times a day before meals  insulin lispro Injectable (HumaLOG) 10 Unit(s) SubCutaneous three times a day before meals  levothyroxine 125 MICROGram(s) Oral daily  lisinopril 20 milliGRAM(s) Oral daily  pantoprazole    Tablet 40 milliGRAM(s) Oral before breakfast  sodium chloride 0.9%. 1000 milliLiter(s) (100 mL/Hr) IV Continuous <Continuous>  ticagrelor 90 milliGRAM(s) Oral two times a day    MEDICATIONS  (PRN):  calcium carbonate    500 mG (Tums) Chewable 1 Tablet(s) Chew four times a day PRN Dyspepsia  nitroglycerin     SubLingual 0.4 milliGRAM(s) SubLingual every 5 minutes PRN Chest Pain    VITALS:   T(C): 36.8   HR: 96   BP: 153/94  RR: 20  SpO2: 97%    LABS:                            15.0   12.68 )-----------( 305      ( 01 Jan 2019 04:46 )             43.3     01-01    138  |  99  |  20  ----------------------------<  222<H>  4.3   |  22  |  1.2    Ca    9.4      01 Jan 2019 04:46  Mg     2.0     01-01    TPro  6.8  /  Alb  3.7  /  TBili  0.4  /  DBili  x   /  AST  19  /  ALT  28  /  AlkPhos  94  01-01    PT/INR - ( 30 Dec 2018 01:28 )   PT: 10.70 sec;   INR: 0.93 ratio         PTT - ( 30 Dec 2018 01:28 )  PTT:33.9 sec  Urinalysis Basic - ( 30 Dec 2018 06:41 )    Color: Yellow / Appearance: Clear / SG: >=1.030 / pH: x  Gluc: x / Ketone: Negative  / Bili: Negative / Urobili: 0.2   Blood: x / Protein: Negative / Nitrite: Negative   Leuk Esterase: Negative / RBC: x / WBC x   Sq Epi: x / Non Sq Epi: x / Bacteria: x    Troponin T, Serum: <0.01 ng/mL (12-31-18 @ 08:00)    CARDIAC MARKERS ( 31 Dec 2018 08:00 )  x     / <0.01 ng/mL / x     / x     / x      CARDIAC MARKERS ( 30 Dec 2018 06:41 )  x     / <0.01 ng/mL / x     / x     / x      CARDIAC MARKERS ( 30 Dec 2018 01:28 )  x     / <0.01 ng/mL / x     / x     / x        RADIOLOGY:    PHYSICAL EXAM:  GEN: awake, alert  HEAD:  Atraumatic, normocephaic  Eyes:  EOMI, Sclera white  LUNGS: ctab  HEART: RRR, S1 and S2  ABD: soft non tender non distended  EXT: no edema  NEURO: oriented x 4, non focal

## 2019-01-01 NOTE — PROGRESS NOTE ADULT - SUBJECTIVE AND OBJECTIVE BOX
SUBJ:  no cp or sob     MEDICATIONS  (STANDING):  aspirin 325 milliGRAM(s) Oral daily  atorvastatin 80 milliGRAM(s) Oral at bedtime  chlorhexidine 4% Liquid 1 Application(s) Topical <User Schedule>  dextrose 50% Injectable 25 Gram(s) IV Push once  diltiazem    milliGRAM(s) Oral daily  enoxaparin Injectable 40 milliGRAM(s) SubCutaneous every 24 hours  eptifibatide Infusion 75 mg/100 mL 2 MICROgram(s)/kG/Min (21.408 mL/Hr) IV Continuous <Continuous>  insulin glargine Injectable (LANTUS) 25 Unit(s) SubCutaneous at bedtime  insulin lispro (HumaLOG) corrective regimen sliding scale   SubCutaneous three times a day before meals  insulin lispro Injectable (HumaLOG) 10 Unit(s) SubCutaneous three times a day before meals  levothyroxine 125 MICROGram(s) Oral daily  lisinopril 20 milliGRAM(s) Oral daily  pantoprazole    Tablet 40 milliGRAM(s) Oral before breakfast  sodium chloride 0.9%. 1000 milliLiter(s) (100 mL/Hr) IV Continuous <Continuous>  ticagrelor 90 milliGRAM(s) Oral two times a day    MEDICATIONS  (PRN):  calcium carbonate    500 mG (Tums) Chewable 1 Tablet(s) Chew four times a day PRN Dyspepsia  nitroglycerin     SubLingual 0.4 milliGRAM(s) SubLingual every 5 minutes PRN Chest Pain            Vital Signs Last 24 Hrs  T(C): 36.8 (01 Jan 2019 04:00), Max: 36.8 (01 Jan 2019 00:00)  T(F): 98.2 (01 Jan 2019 04:00), Max: 98.2 (01 Jan 2019 00:00)  HR: 96 (01 Jan 2019 06:00) (50 - 102)  BP: 147/100 (01 Jan 2019 06:00) (79/58 - 179/117)  BP(mean): 124 (01 Jan 2019 06:00) (68 - 132)  RR: 27 (01 Jan 2019 06:00) (9 - 36)  SpO2: 97% (01 Jan 2019 06:00) (97% - 99%)          PHYSICAL EXAM:  · CONSTITUTIONAL:	Well-developed, well nourished    BMI-  ·RESPIRATORY:   airway patent; breath sounds equal; good air movement; respirations non-labored; clear to auscultation bilaterally; no chest wall tenderness; no intercostal retractions; no rales,rhonchi or wheeze  · CARDIOVASCULAR	regular rate and rhythm  no rub  no murmur  normal PMI  · EXTREMITIES: No cyanosis, clubbing or edema  · VASCULAR: 	Equal and normal pulses (carotid, femoral, dorsalis pedis) right wrist intact post cath   	  TELEMETRY:    ECG:    TTE:    LABS:                        15.0   12.68 )-----------( 305      ( 01 Jan 2019 04:46 )             43.3     01-01    138  |  99  |  20  ----------------------------<  222<H>  4.3   |  22  |  1.2    Ca    9.4      01 Jan 2019 04:46  Mg     2.0     01-01    TPro  6.8  /  Alb  3.7  /  TBili  0.4  /  DBili  x   /  AST  19  /  ALT  28  /  AlkPhos  94  01-01    CARDIAC MARKERS ( 31 Dec 2018 08:00 )  x     / <0.01 ng/mL / x     / x     / x          PT/INR - ( 01 Jan 2019 04:46 )   PT: 12.10 sec;   INR: 1.05 ratio         PTT - ( 01 Jan 2019 04:46 )  PTT:31.2 sec    I&O's Summary    31 Dec 2018 07:01  -  01 Jan 2019 07:00  --------------------------------------------------------  IN: 1480 mL / OUT: 2000 mL / NET: -520 mL      BNP  RADIOLOGY & ADDITIONAL STUDIES:    IMPRESSION AND PLAN:    s/p stent RCA due to abnl thall  Tn (-)   AAA  HTN  RA  s/p CVA     Rec   can d/c to home   will follow up in office

## 2019-01-01 NOTE — PROGRESS NOTE ADULT - ASSESSMENT
1. Chest pain r/o ACS  - EKG: T-wave inversions in inferior leads which are not seen when comparing to last available EKG from 11/2018  - troponin <0.01 x3  - CXR: no radiographic evidence of acute cardio-pulmonary disease  - CTA chest: no evidence of aortic dissection  - nuclear stress test: large reversible defect involving inferior and inferior septal walls of the LV extending to the apex consistent with ischemia  - 2D echo from 11/2018: normal LV systolic function  - continue ASA, statin, and ACEI; NTG prn chest pain  - Cath - significant for 1 vessel CAD s/p PCI of mid-RCA    2. HTN  - Lisinopril 20 mg PO daily  - continue Diltiazem  mg PO daily  - monitor BP; optimal goal is <130/80 mm Hg    3. Type II DM  - Hgb A1c was 9.1% from November 2018  - hold oral hypoglycemic medications  - start insulin regimen and monitor FS    4. DLD  - continue Lipitor    5. History of CVA  - continue ASA and statin  - f/u with neurology as outpatient    6. AAA  - CTA chest: unchanged 4.5 cm infrarenal AAA  - f/u with vascular surgery as outpatient    7. Rheumatoid arthritis  - f/u with rheumatology as outpatient    8. Hypothyroidism  - continue Synthroid  - TSH was 4.88 from November 2018    9. Pulmonary nodule  - CTA chest: right middle lobe 4 mm solid pulmonary nodule; follow-up CT chest may be obtained in 12 months to assess stability    10. Renal hypodensity  - CTA chest: unchanged left lower pole 2.3 x 2.0 cm indeterminate hypodensity; outpatient MRI with contrast is recommended for further evaluation    11. DVT and GI prophylaxis  - LMWH and PPI daily    12. Disposition  - CCU for now

## 2019-01-04 ENCOUNTER — OUTPATIENT (OUTPATIENT)
Dept: OUTPATIENT SERVICES | Facility: HOSPITAL | Age: 59
LOS: 1 days | Discharge: HOME | End: 2019-01-04

## 2019-01-04 DIAGNOSIS — Z90.49 ACQUIRED ABSENCE OF OTHER SPECIFIED PARTS OF DIGESTIVE TRACT: Chronic | ICD-10-CM

## 2019-01-04 DIAGNOSIS — Z98.890 OTHER SPECIFIED POSTPROCEDURAL STATES: Chronic | ICD-10-CM

## 2019-01-05 DIAGNOSIS — R53.82 CHRONIC FATIGUE, UNSPECIFIED: ICD-10-CM

## 2019-01-05 DIAGNOSIS — E13.9 OTHER SPECIFIED DIABETES MELLITUS WITHOUT COMPLICATIONS: ICD-10-CM

## 2019-01-05 DIAGNOSIS — N39.0 URINARY TRACT INFECTION, SITE NOT SPECIFIED: ICD-10-CM

## 2019-01-05 DIAGNOSIS — E29.1 TESTICULAR HYPOFUNCTION: ICD-10-CM

## 2019-01-05 DIAGNOSIS — E55.9 VITAMIN D DEFICIENCY, UNSPECIFIED: ICD-10-CM

## 2019-01-05 DIAGNOSIS — D51.9 VITAMIN B12 DEFICIENCY ANEMIA, UNSPECIFIED: ICD-10-CM

## 2019-01-05 DIAGNOSIS — R94.6 ABNORMAL RESULTS OF THYROID FUNCTION STUDIES: ICD-10-CM

## 2019-01-05 DIAGNOSIS — R80.9 PROTEINURIA, UNSPECIFIED: ICD-10-CM

## 2019-01-05 DIAGNOSIS — E78.5 HYPERLIPIDEMIA, UNSPECIFIED: ICD-10-CM

## 2019-01-15 DIAGNOSIS — M06.9 RHEUMATOID ARTHRITIS, UNSPECIFIED: ICD-10-CM

## 2019-01-15 DIAGNOSIS — Z82.49 FAMILY HISTORY OF ISCHEMIC HEART DISEASE AND OTHER DISEASES OF THE CIRCULATORY SYSTEM: ICD-10-CM

## 2019-01-15 DIAGNOSIS — E78.5 HYPERLIPIDEMIA, UNSPECIFIED: ICD-10-CM

## 2019-01-15 DIAGNOSIS — I10 ESSENTIAL (PRIMARY) HYPERTENSION: ICD-10-CM

## 2019-01-15 DIAGNOSIS — Z79.84 LONG TERM (CURRENT) USE OF ORAL HYPOGLYCEMIC DRUGS: ICD-10-CM

## 2019-01-15 DIAGNOSIS — I71.4 ABDOMINAL AORTIC ANEURYSM, WITHOUT RUPTURE: ICD-10-CM

## 2019-01-15 DIAGNOSIS — Z87.891 PERSONAL HISTORY OF NICOTINE DEPENDENCE: ICD-10-CM

## 2019-01-15 DIAGNOSIS — Z82.3 FAMILY HISTORY OF STROKE: ICD-10-CM

## 2019-01-15 DIAGNOSIS — R07.9 CHEST PAIN, UNSPECIFIED: ICD-10-CM

## 2019-01-15 DIAGNOSIS — E03.9 HYPOTHYROIDISM, UNSPECIFIED: ICD-10-CM

## 2019-01-15 DIAGNOSIS — Z88.0 ALLERGY STATUS TO PENICILLIN: ICD-10-CM

## 2019-01-15 DIAGNOSIS — I25.10 ATHEROSCLEROTIC HEART DISEASE OF NATIVE CORONARY ARTERY WITHOUT ANGINA PECTORIS: ICD-10-CM

## 2019-01-15 DIAGNOSIS — Z86.73 PERSONAL HISTORY OF TRANSIENT ISCHEMIC ATTACK (TIA), AND CEREBRAL INFARCTION WITHOUT RESIDUAL DEFICITS: ICD-10-CM

## 2019-01-15 DIAGNOSIS — E11.9 TYPE 2 DIABETES MELLITUS WITHOUT COMPLICATIONS: ICD-10-CM

## 2019-01-15 DIAGNOSIS — Z79.82 LONG TERM (CURRENT) USE OF ASPIRIN: ICD-10-CM

## 2019-02-08 ENCOUNTER — OUTPATIENT (OUTPATIENT)
Dept: OUTPATIENT SERVICES | Facility: HOSPITAL | Age: 59
LOS: 1 days | Discharge: HOME | End: 2019-02-08

## 2019-02-08 DIAGNOSIS — Z98.890 OTHER SPECIFIED POSTPROCEDURAL STATES: Chronic | ICD-10-CM

## 2019-02-08 DIAGNOSIS — Z90.49 ACQUIRED ABSENCE OF OTHER SPECIFIED PARTS OF DIGESTIVE TRACT: Chronic | ICD-10-CM

## 2019-02-19 DIAGNOSIS — I65.1 OCCLUSION AND STENOSIS OF BASILAR ARTERY: ICD-10-CM

## 2019-03-01 ENCOUNTER — APPOINTMENT (OUTPATIENT)
Dept: VASCULAR SURGERY | Facility: CLINIC | Age: 59
End: 2019-03-01
Payer: MEDICAID

## 2019-03-01 PROCEDURE — 93978 VASCULAR STUDY: CPT

## 2019-03-01 PROCEDURE — 99213 OFFICE O/P EST LOW 20 MIN: CPT

## 2019-03-01 NOTE — DATA REVIEWED
[FreeTextEntry1] : I performed an arterial duplex which was medically necessary to evaluate for growth of AAA. It showed AAA at 4.6 cm unchanged.\par

## 2019-03-01 NOTE — ASSESSMENT
[FreeTextEntry1] : 58 y/o gentleman with h/o AAA presents for followup. Recently  admitted for MI and underwent coronary angioplasty and stent 12/31/18, is on Aspirin and Brilinta. His aneurysm remains 4.7 cm in size on duplex, unchanged. I will obtain a CTA of abdomen and pelvis in six months time.

## 2019-03-01 NOTE — CONSULT LETTER
[Dear  ___] : Dear  [unfilled], [Courtesy Letter:] : I had the pleasure of seeing your patient, [unfilled], in my office today. [Please see my note below.] : Please see my note below. [FreeTextEntry2] : Dear Dr. Anish Esqueda,

## 2019-03-01 NOTE — HISTORY OF PRESENT ILLNESS
[FreeTextEntry1] : 58 y/o gentleman with h/o AAA presents for followup. Recently  admitted for MI and underwent coronary angioplasty and stent 12/31/18, is on Aspirin and Brilinta.

## 2019-07-01 PROCEDURE — G9001: CPT

## 2019-07-01 PROCEDURE — G9005: CPT

## 2019-08-06 ENCOUNTER — APPOINTMENT (OUTPATIENT)
Dept: NEUROLOGY | Facility: CLINIC | Age: 59
End: 2019-08-06

## 2019-10-22 ENCOUNTER — APPOINTMENT (OUTPATIENT)
Dept: VASCULAR SURGERY | Facility: CLINIC | Age: 59
End: 2019-10-22
Payer: MEDICAID

## 2019-10-23 ENCOUNTER — APPOINTMENT (OUTPATIENT)
Dept: CARDIOLOGY | Facility: CLINIC | Age: 59
End: 2019-10-23
Payer: MEDICAID

## 2019-10-23 PROCEDURE — 93000 ELECTROCARDIOGRAM COMPLETE: CPT

## 2019-10-23 PROCEDURE — 99214 OFFICE O/P EST MOD 30 MIN: CPT | Mod: 25

## 2019-11-02 NOTE — PATIENT PROFILE ADULT - EQUIPMENT CURRENTLY USED AT HOME
Prep Survey      Responses   Facility patient discharged from?  [facility]   Is patient eligible?  Yes   Discharge diagnosis  Acute Right Lower Extremity DVT,  Pulmonary embolus   Does the patient have one of the following disease processes/diagnoses(primary or secondary)?  Other   Does the patient have Home health ordered?  No   Is there a DME ordered?  No   Prep survey completed?  Yes          Isabelle Hugo RN         no

## 2019-11-05 ENCOUNTER — APPOINTMENT (OUTPATIENT)
Dept: VASCULAR SURGERY | Facility: CLINIC | Age: 59
End: 2019-11-05
Payer: MEDICAID

## 2019-11-05 PROCEDURE — 93978 VASCULAR STUDY: CPT

## 2019-11-05 PROCEDURE — 99213 OFFICE O/P EST LOW 20 MIN: CPT

## 2019-11-05 NOTE — DATA REVIEWED
[FreeTextEntry1] : I performed an arterial duplex which was medically necessary to evaluate for growth of AAA. It showed AAA at 4.7 cm unchanged.\par

## 2019-11-05 NOTE — HISTORY OF PRESENT ILLNESS
[FreeTextEntry1] : 60 y/o gentleman with h/o AAA presents for followup. Hx of MI and underwent coronary angioplasty and stent 12/31/18, is on Aspirin and Brilinta.

## 2019-11-05 NOTE — ASSESSMENT
[FreeTextEntry1] : 58 y/o gentleman with h/o AAA presents for followup. History of an  MI and underwent coronary angioplasty and stent 12/31/18, is on Aspirin and Brilinta. His aneurysm remains 4.7 cm in size on duplex, unchanged. I will obtain a CTA of abdomen and pelvis in six months time.

## 2019-11-06 ENCOUNTER — APPOINTMENT (OUTPATIENT)
Dept: CARDIOLOGY | Facility: CLINIC | Age: 59
End: 2019-11-06
Payer: MEDICAID

## 2019-11-06 PROCEDURE — 93306 TTE W/DOPPLER COMPLETE: CPT

## 2019-11-07 ENCOUNTER — APPOINTMENT (OUTPATIENT)
Dept: NEUROLOGY | Facility: CLINIC | Age: 59
End: 2019-11-07
Payer: MEDICAID

## 2019-11-07 ENCOUNTER — OUTPATIENT (OUTPATIENT)
Dept: OUTPATIENT SERVICES | Facility: HOSPITAL | Age: 59
LOS: 1 days | Discharge: HOME | End: 2019-11-07

## 2019-11-07 VITALS
HEART RATE: 91 BPM | HEIGHT: 74 IN | SYSTOLIC BLOOD PRESSURE: 161 MMHG | WEIGHT: 315 LBS | BODY MASS INDEX: 40.43 KG/M2 | DIASTOLIC BLOOD PRESSURE: 106 MMHG

## 2019-11-07 DIAGNOSIS — Z98.890 OTHER SPECIFIED POSTPROCEDURAL STATES: Chronic | ICD-10-CM

## 2019-11-07 DIAGNOSIS — I63.9 CEREBRAL INFARCTION, UNSPECIFIED: ICD-10-CM

## 2019-11-07 DIAGNOSIS — Z87.891 PERSONAL HISTORY OF NICOTINE DEPENDENCE: ICD-10-CM

## 2019-11-07 DIAGNOSIS — Z90.49 ACQUIRED ABSENCE OF OTHER SPECIFIED PARTS OF DIGESTIVE TRACT: Chronic | ICD-10-CM

## 2019-11-07 PROCEDURE — 99214 OFFICE O/P EST MOD 30 MIN: CPT

## 2019-11-07 NOTE — DISCUSSION/SUMMARY
[FreeTextEntry1] : Mr. Perez is a 58yo man with history of stroke and severe basilar artery stenosis.\par 1. Repeat CTA H+N to look for any progression of disease\par 2. Continue aspirin and brilinta and statin\par 3. Recommended diet and exercise and nutrionist if needed\par 4. f/u in 6 months\par 
Warm

## 2019-11-07 NOTE — HISTORY OF PRESENT ILLNESS
[FreeTextEntry1] : Mr. Perez is a 59-year-old man last seen by me on 2/11/2019 for follow-up of his stroke with severe basilar stenosis.  Since last visit he had follow up with Dr. Raygoza and is scheduled to follow up on the abdominal aneurysm in May 2020.  He has had no new complaints.  He has not lost any weight since last visit and believes it was because he quit smoking.\par He is still raising his 14yo and 15 yo children independently.\par He does not sleep well and usually gets only 3-4 hours of sleep per night\par

## 2019-11-07 NOTE — PHYSICAL EXAM
[FreeTextEntry1] : He is alert and oriented to person, place, and time.  Cranial nerves II through XII are normal.  Power is 5/5 in all extremities.  Sensory exam is symmetric to pinprick, temperature and vibration.  Finger-to-nose is normal.  Gait is normal.\par \par NIHSS 0\par mrankin 0\par

## 2019-11-08 LAB
ANION GAP SERPL CALC-SCNC: 17 MMOL/L
BUN SERPL-MCNC: 18 MG/DL
CALCIUM SERPL-MCNC: 9.8 MG/DL
CHLORIDE SERPL-SCNC: 99 MMOL/L
CO2 SERPL-SCNC: 25 MMOL/L
CREAT SERPL-MCNC: 1.2 MG/DL
GLUCOSE SERPL-MCNC: 145 MG/DL
POTASSIUM SERPL-SCNC: 4.4 MMOL/L
SODIUM SERPL-SCNC: 141 MMOL/L

## 2019-11-23 ENCOUNTER — OUTPATIENT (OUTPATIENT)
Dept: OUTPATIENT SERVICES | Facility: HOSPITAL | Age: 59
LOS: 1 days | Discharge: HOME | End: 2019-11-23
Payer: MEDICAID

## 2019-11-23 DIAGNOSIS — I63.9 CEREBRAL INFARCTION, UNSPECIFIED: ICD-10-CM

## 2019-11-23 DIAGNOSIS — Z90.49 ACQUIRED ABSENCE OF OTHER SPECIFIED PARTS OF DIGESTIVE TRACT: Chronic | ICD-10-CM

## 2019-11-23 DIAGNOSIS — Z98.890 OTHER SPECIFIED POSTPROCEDURAL STATES: Chronic | ICD-10-CM

## 2019-11-23 PROCEDURE — 70498 CT ANGIOGRAPHY NECK: CPT | Mod: 26

## 2019-11-23 PROCEDURE — 70496 CT ANGIOGRAPHY HEAD: CPT | Mod: 26

## 2019-11-26 PROCEDURE — 70498 CT ANGIOGRAPHY NECK: CPT | Mod: 26

## 2019-12-30 ENCOUNTER — APPOINTMENT (OUTPATIENT)
Dept: CARDIOLOGY | Facility: CLINIC | Age: 59
End: 2019-12-30
Payer: MEDICAID

## 2019-12-30 PROCEDURE — 93000 ELECTROCARDIOGRAM COMPLETE: CPT

## 2019-12-30 PROCEDURE — 99214 OFFICE O/P EST MOD 30 MIN: CPT

## 2019-12-31 ENCOUNTER — TRANSCRIPTION ENCOUNTER (OUTPATIENT)
Age: 59
End: 2019-12-31

## 2020-05-18 ENCOUNTER — APPOINTMENT (OUTPATIENT)
Dept: NEUROLOGY | Facility: CLINIC | Age: 60
End: 2020-05-18
Payer: MEDICAID

## 2020-05-18 PROCEDURE — 99442: CPT

## 2020-07-07 ENCOUNTER — APPOINTMENT (OUTPATIENT)
Dept: VASCULAR SURGERY | Facility: CLINIC | Age: 60
End: 2020-07-07

## 2020-08-02 NOTE — PATIENT PROFILE ADULT - [facility]
4) no limitation

## 2020-10-05 ENCOUNTER — RECORD ABSTRACTING (OUTPATIENT)
Age: 60
End: 2020-10-05

## 2020-10-05 DIAGNOSIS — Z86.79 PERSONAL HISTORY OF OTHER DISEASES OF THE CIRCULATORY SYSTEM: ICD-10-CM

## 2020-10-05 RX ORDER — RAMIPRIL 10 MG/1
10 CAPSULE ORAL
Refills: 0 | Status: ACTIVE | COMMUNITY

## 2020-10-05 RX ORDER — ATORVASTATIN CALCIUM 80 MG/1
80 TABLET, FILM COATED ORAL DAILY
Refills: 0 | Status: ACTIVE | COMMUNITY

## 2020-11-17 ENCOUNTER — APPOINTMENT (OUTPATIENT)
Dept: NEUROLOGY | Facility: CLINIC | Age: 60
End: 2020-11-17
Payer: MEDICARE

## 2020-11-17 PROCEDURE — 99213 OFFICE O/P EST LOW 20 MIN: CPT | Mod: 95

## 2020-11-17 NOTE — PHYSICAL EXAM
EXAM note      Chief Complaint  Chief Complaint   Patient presents with   • Physical     1. Right great toenail/toe pain       History    Abril Brizuela is a 39 year old female who presents to the clinic for routine physical exam without pap and pelvic.  Patient has history of hysterectomy due to history of severe-cervical dysplasia-carcinoma in situ.  Patient does complete yearly exam with OB/GYN.  Denies unusual vaginal bleeding, pelvic pain or vaginal discharge. She does complain of constipation. This has been ongoing. She has tried over-the-counter MiraLAX which is helpful but is not taking on a daily basis. Denies sudden change in bowel habits. Denies blood in stools.  She also complains of right great toe pain and nail thickening to right third toe.  She has not attempted anything over-the-counter for her to toe pain or nails.    Past Medical History:   Diagnosis Date   • Anxiety    • PONV (postoperative nausea and vomiting)    • Severe cervical dysplasia        Current Outpatient Prescriptions   Medication Sig Dispense Refill   • clonazePAM (KLONOPIN) 0.5 MG tablet Take 0.5 tablets by mouth daily as needed for Anxiety. 10 tablet 0   • fluconazole (DIFLUCAN) 150 MG tablet Take 1 tablet by mouth today and 1 tomorrow 2 tablet 0     No current facility-administered medications for this visit.        ALLERGIES:   Allergen Reactions   • Norco [Hydrocodone-Acetaminophen] PRURITUS        Social History     Social History   • Marital status:      Spouse name: N/A   • Number of children: N/A   • Years of education: N/A     Occupational History   • Not on file.     Social History Main Topics   • Smoking status: Never Smoker   • Smokeless tobacco: Never Used   • Alcohol use No   • Drug use: No   • Sexual activity: Yes     Birth control/ protection: Surgical     Other Topics Concern   • Not on file     Social History Narrative   • No narrative on file         Review of systems    Constitutional:  Patient denies  FreeTextEntry1] : A+Ox3 language and attention normal\par NO facial\par Movements symmetric\par Gait normal\par  return to clinic with any worsening or change in symptoms.  Patient has verbalized understanding and agreed to plan of care.    Health Maintenance:    1.  Discussed importance of Tdap, however patient has declined.

## 2020-11-17 NOTE — DISCUSSION/SUMMARY
[FreeTextEntry1] : Mr. Perez is a 61yo man with history of stroke and posterior circulation stenosis\par \par 1. Repeat CTA H+N in 6 months to assess stability of basilar artery stenosis and brilinta aspirin therapy \par 2. If needs surgery for abdominal aneurysm will obtain CTA prior to surgery to risk stratify but he will need his blood pressure maintained normotensive to slightly elevated during any procedure with highest risk due too drop in blood pressure\par 3. Continue adequate fluid hydration exercise and diet\par 4. Given warning signs and told to call or goto ED for any new neurological symptoms.

## 2020-11-25 ENCOUNTER — APPOINTMENT (OUTPATIENT)
Dept: CARDIOLOGY | Facility: CLINIC | Age: 60
End: 2020-11-25
Payer: MEDICARE

## 2020-11-25 VITALS — SYSTOLIC BLOOD PRESSURE: 126 MMHG | DIASTOLIC BLOOD PRESSURE: 84 MMHG

## 2020-11-25 VITALS
TEMPERATURE: 98.3 F | SYSTOLIC BLOOD PRESSURE: 162 MMHG | BODY MASS INDEX: 40.43 KG/M2 | HEIGHT: 74 IN | WEIGHT: 315 LBS | HEART RATE: 92 BPM | DIASTOLIC BLOOD PRESSURE: 92 MMHG

## 2020-11-25 PROCEDURE — 93000 ELECTROCARDIOGRAM COMPLETE: CPT

## 2020-11-25 RX ORDER — THYROID 120 MG/1
120 TABLET ORAL DAILY
Refills: 0 | Status: COMPLETED | COMMUNITY
End: 2020-11-25

## 2020-11-25 RX ORDER — ATORVASTATIN CALCIUM 80 MG/1
TABLET, FILM COATED ORAL DAILY
Refills: 0 | Status: COMPLETED | COMMUNITY
End: 2020-11-25

## 2020-11-25 NOTE — ASSESSMENT
[FreeTextEntry1] : AAA seeing carmen may need procedure\par PVC\par NIDDM\par basilar artery stenosis seeing neuro \par had TIA involving right side of body 2018 see more and yaz \par HTN increase ramapril to 20 \par s/p hosp 1/2019 + thall inferiorly had cath with lesion and clot in RCA other coronaries stable \par also has nodule in lung should f/u pulmonary still needs to was done \par not smoking now 2 years \par saw renal (-)  \par  echo NL EF 2019 \par ASC aorta 3.8\par probable venous insuff \par was on diuretic will use prn if above (-) \par today hr and Bp mildly elvated pt with flu like symptoms \par continue brelinta especial due to cerbral stenosis\par needs labs going for test by pmd  \par

## 2020-11-25 NOTE — PHYSICAL EXAM
[General Appearance - Well Developed] : well developed [Normal Appearance] : normal appearance [Well Groomed] : well groomed [General Appearance - Well Nourished] : well nourished [No Deformities] : no deformities [General Appearance - In No Acute Distress] : no acute distress [Normal Conjunctiva] : the conjunctiva exhibited no abnormalities [Eyelids - No Xanthelasma] : the eyelids demonstrated no xanthelasmas [Normal Oral Mucosa] : normal oral mucosa [No Oral Pallor] : no oral pallor [No Oral Cyanosis] : no oral cyanosis [Normal Jugular Venous A Waves Present] : normal jugular venous A waves present [Normal Jugular Venous V Waves Present] : normal jugular venous V waves present [No Jugular Venous Catalan A Waves] : no jugular venous catalan A waves [] : no respiratory distress [Auscultation Breath Sounds / Voice Sounds] : lungs were clear to auscultation bilaterally [Heart Rate And Rhythm] : heart rate and rhythm were normal [Heart Sounds] : normal S1 and S2 [Murmurs] : no murmurs present [Arterial Pulses Normal] : the arterial pulses were normal [Edema] : no peripheral edema present [Bowel Sounds] : normal bowel sounds [Abdomen Tenderness] : non-tender [Abdomen Mass (___ Cm)] : no abdominal mass palpated [Nail Clubbing] : no clubbing of the fingernails [Cyanosis, Localized] : no localized cyanosis [Oriented To Time, Place, And Person] : oriented to person, place, and time [FreeTextEntry1] : No JVD

## 2020-12-06 ENCOUNTER — RESULT REVIEW (OUTPATIENT)
Age: 60
End: 2020-12-06

## 2020-12-06 ENCOUNTER — OUTPATIENT (OUTPATIENT)
Dept: OUTPATIENT SERVICES | Facility: HOSPITAL | Age: 60
LOS: 1 days | Discharge: HOME | End: 2020-12-06
Payer: MEDICARE

## 2020-12-06 DIAGNOSIS — Z98.890 OTHER SPECIFIED POSTPROCEDURAL STATES: Chronic | ICD-10-CM

## 2020-12-06 DIAGNOSIS — R51.9 HEADACHE, UNSPECIFIED: ICD-10-CM

## 2020-12-06 DIAGNOSIS — I65.1 OCCLUSION AND STENOSIS OF BASILAR ARTERY: ICD-10-CM

## 2020-12-06 DIAGNOSIS — Z90.49 ACQUIRED ABSENCE OF OTHER SPECIFIED PARTS OF DIGESTIVE TRACT: Chronic | ICD-10-CM

## 2020-12-06 PROCEDURE — 70498 CT ANGIOGRAPHY NECK: CPT | Mod: 26

## 2020-12-06 PROCEDURE — 70496 CT ANGIOGRAPHY HEAD: CPT | Mod: 26

## 2021-02-26 ENCOUNTER — APPOINTMENT (OUTPATIENT)
Dept: VASCULAR SURGERY | Facility: CLINIC | Age: 61
End: 2021-02-26
Payer: MEDICARE

## 2021-02-26 VITALS
BODY MASS INDEX: 38.63 KG/M2 | SYSTOLIC BLOOD PRESSURE: 120 MMHG | HEIGHT: 74 IN | WEIGHT: 301 LBS | DIASTOLIC BLOOD PRESSURE: 80 MMHG | TEMPERATURE: 96.7 F | HEART RATE: 103 BPM

## 2021-02-26 PROCEDURE — 99213 OFFICE O/P EST LOW 20 MIN: CPT

## 2021-02-26 PROCEDURE — 99072 ADDL SUPL MATRL&STAF TM PHE: CPT

## 2021-02-26 PROCEDURE — 93978 VASCULAR STUDY: CPT

## 2021-02-26 NOTE — ASSESSMENT
[FreeTextEntry1] : Pt has been in good health with no intercurrent issues.\par Duplex today shows AAA about 5 cm.  \par \par We will obtain a CTA of abd/pelvis to further evaluate AAA and see if its amenable to repair

## 2021-03-10 ENCOUNTER — RESULT REVIEW (OUTPATIENT)
Age: 61
End: 2021-03-10

## 2021-03-10 ENCOUNTER — OUTPATIENT (OUTPATIENT)
Dept: OUTPATIENT SERVICES | Facility: HOSPITAL | Age: 61
LOS: 1 days | Discharge: HOME | End: 2021-03-10
Payer: MEDICARE

## 2021-03-10 DIAGNOSIS — Z90.49 ACQUIRED ABSENCE OF OTHER SPECIFIED PARTS OF DIGESTIVE TRACT: Chronic | ICD-10-CM

## 2021-03-10 DIAGNOSIS — I71.4 ABDOMINAL AORTIC ANEURYSM, WITHOUT RUPTURE: ICD-10-CM

## 2021-03-10 DIAGNOSIS — Z98.890 OTHER SPECIFIED POSTPROCEDURAL STATES: Chronic | ICD-10-CM

## 2021-03-10 PROCEDURE — 74174 CTA ABD&PLVS W/CONTRAST: CPT | Mod: 26

## 2021-03-12 ENCOUNTER — NON-APPOINTMENT (OUTPATIENT)
Age: 61
End: 2021-03-12

## 2021-03-29 ENCOUNTER — APPOINTMENT (OUTPATIENT)
Dept: UROLOGY | Facility: CLINIC | Age: 61
End: 2021-03-29
Payer: MEDICARE

## 2021-03-29 VITALS — TEMPERATURE: 97 F | HEIGHT: 74 IN | WEIGHT: 303 LBS | BODY MASS INDEX: 38.89 KG/M2

## 2021-03-29 DIAGNOSIS — R93.49 ABNORMAL RADIOLOGIC FINDINGS ON DIAGNOSITIC IMAGING OF OTHER URINARY ORGANS: ICD-10-CM

## 2021-03-29 DIAGNOSIS — R39.9 UNSPECIFIED SYMPTOMS AND SIGNS INVOLVING THE GENITOURINARY SYSTEM: ICD-10-CM

## 2021-03-29 DIAGNOSIS — N40.1 BENIGN PROSTATIC HYPERPLASIA WITH LOWER URINARY TRACT SYMPMS: ICD-10-CM

## 2021-03-29 DIAGNOSIS — N13.8 BENIGN PROSTATIC HYPERPLASIA WITH LOWER URINARY TRACT SYMPMS: ICD-10-CM

## 2021-03-29 PROCEDURE — 99214 OFFICE O/P EST MOD 30 MIN: CPT

## 2021-03-29 PROCEDURE — 99072 ADDL SUPL MATRL&STAF TM PHE: CPT

## 2021-03-29 NOTE — HISTORY OF PRESENT ILLNESS
[FreeTextEntry1] : 61-year-old with 2.8 cm left lower pole renal lesion seen on CT scan done for following up AAA. It was a post IV contrast scan. In 2010 pre-and post IV contrast CT scan showed a 1.5 cm left hyperdense lesion. He is a 4.8 cm AAA which he is seeing vascular for and is planning six-month followup.\par \par He has a history of BPH and lower urinary tract symptoms with nocturia x1, usually a good urinary stream, no urgency, rare intermittency, no frequency, rare sensation of incomplete voiding with a prostate symptom score of 6/35. Patient states he can live with the symptoms as they are on request of treatment.\par \par PSA 2 years ago was 0.5.\par \par He has a history of hypogonadism and was in the remote past on testosterone replacement but none in several years.\par \par He is a history of essential hematuria workup and 2009 with negative workup. Cystoscopy showed an occlusive prostate. His had no gross hematuria

## 2021-03-29 NOTE — ASSESSMENT
[Urinary Symptom or Sign (788.99\R39.89)] : implantation [FreeTextEntry1] : Left renal mass indeterminate on post contrast CT. We'll get MRI to better evaluate. This was done pre-and post IV contrast.\par \par BPH with lower urinary tract symptoms not bothersome to the patient and therefore requiring no treatment. We'll get new PSA.

## 2021-03-29 NOTE — PHYSICAL EXAM
[General Appearance - In No Acute Distress] : no acute distress [Edema] : no peripheral edema [] : no respiratory distress [Costovertebral Angle Tenderness] : no ~M costovertebral angle tenderness [Prostate Tenderness] : the prostate was not tender [Prostate Size ___ (0-4)] : prostate size [unfilled] (scale: 0-4) [Oriented To Time, Place, And Person] : oriented to person, place, and time

## 2021-04-20 ENCOUNTER — RX RENEWAL (OUTPATIENT)
Age: 61
End: 2021-04-20

## 2021-05-13 ENCOUNTER — APPOINTMENT (OUTPATIENT)
Dept: NEUROLOGY | Facility: CLINIC | Age: 61
End: 2021-05-13

## 2021-06-10 NOTE — ED ADULT TRIAGE NOTE - ARRIVAL FROM
"S:  Patient seen in clinic today for green card exam and update of immunizations.  There is no past history of immunization reactions.  No recent fevers or shortness of breath.     There is no problem list on file for this patient.      O:  /64 (Patient Site: Right Arm, Patient Position: Sitting)   Pulse 86   Temp 97.9  F (36.6  C) (Oral)   Resp 18   Ht 4' 11\" (1.499 m)   Wt 120 lb (54.4 kg)   LMP 08/02/2020   SpO2 98%   Breastfeeding No   BMI 24.24 kg/m    General - alert, NAD  CV - RRR  Resp - lunds clear to auscultation    A/P:  Green Card/update imm.  Counseling done on immunization history and requirements with the patient.  Reviewed available immunization history and relevant lab records with patient and family.  Immunizations given as documented in the EHR and on form.  Acetaminophen as needed for post-immunization fever or myalgias.  [place] [employer] form [id number] completed today with the patient.  Given to patient in a sealed labeled envelope and a copy is being scanned into the EHR.  Please see that form for further details.  Patient directed to follow-up in clinic for routine and preventative care.     "
Home

## 2021-08-17 NOTE — ED ADULT TRIAGE NOTE - TEMPERATURE IN FAHRENHEIT (DEGREES F)
Detail Level: Detailed Quality 111:Pneumonia Vaccination Status For Older Adults: Pneumococcal Vaccination Previously Received Quality 130: Documentation Of Current Medications In The Medical Record: Current Medications Documented Quality 110: Preventive Care And Screening: Influenza Immunization: Influenza Immunization Administered during Influenza season 96.7

## 2021-09-07 ENCOUNTER — APPOINTMENT (OUTPATIENT)
Dept: UROLOGY | Facility: CLINIC | Age: 61
End: 2021-09-07

## 2021-09-22 ENCOUNTER — APPOINTMENT (OUTPATIENT)
Dept: OPHTHALMOLOGY | Facility: CLINIC | Age: 61
End: 2021-09-22

## 2021-09-22 ENCOUNTER — OUTPATIENT (OUTPATIENT)
Dept: OUTPATIENT SERVICES | Facility: HOSPITAL | Age: 61
LOS: 1 days | Discharge: HOME | End: 2021-09-22
Payer: MEDICARE

## 2021-09-22 DIAGNOSIS — Z90.49 ACQUIRED ABSENCE OF OTHER SPECIFIED PARTS OF DIGESTIVE TRACT: Chronic | ICD-10-CM

## 2021-09-22 DIAGNOSIS — Z98.890 OTHER SPECIFIED POSTPROCEDURAL STATES: Chronic | ICD-10-CM

## 2021-09-22 PROCEDURE — 92004 COMPRE OPH EXAM NEW PT 1/>: CPT

## 2021-09-23 DIAGNOSIS — H02.88A MEIBOMIAN GLAND DYSFUNCTION RIGHT EYE, UPPER AND LOWER EYELIDS: ICD-10-CM

## 2021-09-23 DIAGNOSIS — E11.9 TYPE 2 DIABETES MELLITUS WITHOUT COMPLICATIONS: ICD-10-CM

## 2021-09-23 DIAGNOSIS — H35.033 HYPERTENSIVE RETINOPATHY, BILATERAL: ICD-10-CM

## 2021-09-23 DIAGNOSIS — H02.88B MEIBOMIAN GLAND DYSFUNCTION LEFT EYE, UPPER AND LOWER EYELIDS: ICD-10-CM

## 2021-12-20 ENCOUNTER — NON-APPOINTMENT (OUTPATIENT)
Age: 61
End: 2021-12-20

## 2022-01-17 NOTE — DISCHARGE NOTE ADULT - CARE PLAN
Vania Rojas MD     S/W pt and determined that his urine color is clear and I told him that because it is now 10:15 am it is a little late to be removing the morales cath because if he cannot urinate after removal it may be too late for him to co you walk.     3. Hydrocodone narcotic 5 mg/325 mg acetaminophen 1 tablet every  6 hours for severe pain not relieved by plain Tylenol ;  this medication can constipate rapidly so if you take it, take some over-the-counter milk of magnesia with it, either 1 and 10-12 ounces of cold water every morning upon awakening and also an hour before dinner; do not take at bedtime because that can lead to waking up more often at night to urinate        13.    When you see the nurse the day after surgery, please schedule Principal Discharge DX:	Abnormal stress test  Goal:	Resolution  Assessment and plan of treatment:	Please take the medications as prescribed.  Please follow up with Dr. Hoyt - primary medical doctor.  Please do not take metformin until 1/2/2019 as you cannot take it for 48 hours after having a catheterization procedure. Principal Discharge DX:	Abnormal stress test  Goal:	Resolution of symptoms  Assessment and plan of treatment:	Please take the medications as prescribed.  Please follow up with Dr. Hoyt - primary medical doctor.  Please do not take metformin until 1/2/2019 as you cannot take it for 48 hours after having a catheterization procedure.

## 2022-02-07 ENCOUNTER — APPOINTMENT (OUTPATIENT)
Dept: CARDIOLOGY | Facility: CLINIC | Age: 62
End: 2022-02-07
Payer: MEDICARE

## 2022-02-07 VITALS
WEIGHT: 307 LBS | DIASTOLIC BLOOD PRESSURE: 80 MMHG | OXYGEN SATURATION: 98 % | BODY MASS INDEX: 39.4 KG/M2 | SYSTOLIC BLOOD PRESSURE: 134 MMHG | HEIGHT: 74 IN | HEART RATE: 79 BPM

## 2022-02-07 PROCEDURE — 99214 OFFICE O/P EST MOD 30 MIN: CPT

## 2022-02-07 PROCEDURE — 93000 ELECTROCARDIOGRAM COMPLETE: CPT

## 2022-02-07 NOTE — PHYSICAL EXAM
[Well Developed] : well developed [Well Nourished] : well nourished [Normal Conjunctiva] : normal conjunctiva [No Carotid Bruit] : no carotid bruit [Normal S1, S2] : normal S1, S2 [No Murmur] : no murmur [No Rub] : no rub [No Gallop] : no gallop [Clear Lung Fields] : clear lung fields [Good Air Entry] : good air entry [No Respiratory Distress] : no respiratory distress  [Soft] : abdomen soft [Moves all extremities] : moves all extremities [No Focal Deficits] : no focal deficits [No ulcers] : no ulcers [No edema] : no edema [de-identified] : no pulasating mass, no abd bruit

## 2022-02-07 NOTE — HISTORY OF PRESENT ILLNESS
[FreeTextEntry1] : 62M with CAD s/p RCA PCI, AAA, HTN, T2DM, CVA, DLD, basilar artery stenosis here for followup of CAD, HTN, DLD. \par \par No chest pain, SOB, palpitations, LE edema. \par \par AAA incidentally found 20 years ago. Previously following with Dr. Raygoza. \par \par Joined [employer] last month. Trying to lose weight. Resistance training (>150 lbs) yesterday. \par \par Quit smoking \par \par No known family hxo aortic aneurysm, however grandfather  suddenly at 65 yo. Diagnosis unknown\par \par Single dad to 18 yo and 15 yo\par \par Increased stress raising kids by himself. States he is high functioning, but depressed.

## 2022-02-07 NOTE — ASSESSMENT
[FreeTextEntry1] : Assessment:\par #AAA 4.8 cm 3/2021\par #CAD s/p RCA PCI 12/2018\par #Basilar artery stenosis and PCA stenosis\par #HTN\par - /80\par - Goal SBP<110 with AAA\par #Hxo carotid stenosis\par #DLD\par #BMI 39\par \par Plan:\par - Request recent labs from PCP, may need to repeat BMP to check renal function prior to CTA\par - CTA abd/pelvis to re-assess AAA\par - Continue ASA 81 mg, Brilinta 90 mg BID, atorvastatin 80\par - Continue ramipril 10 mg daily and diltiazem 360 mg daily \par - Previously a patient of Dr. Raygoza, recommend he establish care with Dr. Robin Muro\par - Recommend f/u with Dr. Esqueda to discuss BP goal\par - No weight lifting\par - Referral to dietician Bryanna Kaiser. \par

## 2022-02-07 NOTE — REVIEW OF SYSTEMS
[Fever] : no fever [Weight Gain (___ Lbs)] : [unfilled] ~Ulb weight gain [Chills] : no chills [Blurry Vision] : no blurred vision [SOB] : no shortness of breath [Dyspnea on exertion] : not dyspnea during exertion [Chest Discomfort] : no chest discomfort [Lower Ext Edema] : no extremity edema [Leg Claudication] : no intermittent leg claudication [Palpitations] : no palpitations [Orthopnea] : no orthopnea [PND] : no PND [Syncope] : no syncope [Cough] : no cough [Abdominal Pain] : no abdominal pain [Muscle Cramps] : no muscle cramps [Rash] : no rash [Dizziness] : no dizziness [Weakness] : no weakness [Confusion] : no confusion was observed

## 2022-02-10 LAB
ANION GAP SERPL CALC-SCNC: 13 MMOL/L
BUN SERPL-MCNC: 22 MG/DL
CALCIUM SERPL-MCNC: 10.1 MG/DL
CHLORIDE SERPL-SCNC: 103 MMOL/L
CO2 SERPL-SCNC: 23 MMOL/L
CREAT SERPL-MCNC: 1.4 MG/DL
GLUCOSE SERPL-MCNC: 151 MG/DL
POTASSIUM SERPL-SCNC: 4.8 MMOL/L
SODIUM SERPL-SCNC: 139 MMOL/L

## 2022-02-15 ENCOUNTER — APPOINTMENT (OUTPATIENT)
Dept: CARDIOLOGY | Facility: CLINIC | Age: 62
End: 2022-02-15
Payer: SELF-PAY

## 2022-02-15 VITALS — BODY MASS INDEX: 38.89 KG/M2 | HEIGHT: 74 IN | WEIGHT: 303 LBS

## 2022-02-15 PROCEDURE — 97802 MEDICAL NUTRITION INDIV IN: CPT | Mod: 95

## 2022-02-19 ENCOUNTER — OUTPATIENT (OUTPATIENT)
Dept: OUTPATIENT SERVICES | Facility: HOSPITAL | Age: 62
LOS: 1 days | Discharge: HOME | End: 2022-02-19
Payer: MEDICARE

## 2022-02-19 ENCOUNTER — RESULT REVIEW (OUTPATIENT)
Age: 62
End: 2022-02-19

## 2022-02-19 DIAGNOSIS — Z98.890 OTHER SPECIFIED POSTPROCEDURAL STATES: Chronic | ICD-10-CM

## 2022-02-19 DIAGNOSIS — I71.4 ABDOMINAL AORTIC ANEURYSM, WITHOUT RUPTURE: ICD-10-CM

## 2022-02-19 DIAGNOSIS — Z90.49 ACQUIRED ABSENCE OF OTHER SPECIFIED PARTS OF DIGESTIVE TRACT: Chronic | ICD-10-CM

## 2022-02-19 DIAGNOSIS — R10.2 PELVIC AND PERINEAL PAIN: ICD-10-CM

## 2022-02-19 DIAGNOSIS — R10.9 UNSPECIFIED ABDOMINAL PAIN: ICD-10-CM

## 2022-02-19 PROCEDURE — 74174 CTA ABD&PLVS W/CONTRAST: CPT | Mod: 26

## 2022-02-22 ENCOUNTER — NON-APPOINTMENT (OUTPATIENT)
Age: 62
End: 2022-02-22

## 2022-03-12 ENCOUNTER — TRANSCRIPTION ENCOUNTER (OUTPATIENT)
Age: 62
End: 2022-03-12

## 2022-03-17 ENCOUNTER — APPOINTMENT (OUTPATIENT)
Dept: NEUROLOGY | Facility: CLINIC | Age: 62
End: 2022-03-17

## 2022-03-25 ENCOUNTER — APPOINTMENT (OUTPATIENT)
Dept: VASCULAR SURGERY | Facility: CLINIC | Age: 62
End: 2022-03-25

## 2022-04-05 ENCOUNTER — APPOINTMENT (OUTPATIENT)
Dept: NEUROLOGY | Facility: CLINIC | Age: 62
End: 2022-04-05
Payer: MEDICARE

## 2022-04-05 VITALS
WEIGHT: 283 LBS | DIASTOLIC BLOOD PRESSURE: 80 MMHG | OXYGEN SATURATION: 98 % | HEART RATE: 109 BPM | SYSTOLIC BLOOD PRESSURE: 132 MMHG | TEMPERATURE: 97.8 F | HEIGHT: 74 IN | BODY MASS INDEX: 36.32 KG/M2

## 2022-04-05 PROCEDURE — 99215 OFFICE O/P EST HI 40 MIN: CPT

## 2022-04-06 NOTE — HISTORY OF PRESENT ILLNESS
[FreeTextEntry1] : Patient is 61 yo RH man, worked in garment industry, former smoker, with PMHx of CAD s/p stent (2/2 blood clot after he had the stroke), DM II, HTN, HLD, AAA (just under 5mm), RA, hypothyroidism who presents for initial evaluation of short segment critical stenosis of mid basilar artery initially identified in 2018 during stroke work up, referred by Dr. Mikey Delgado.  \par \par Initially, he presented to the ED in 11/2018 (at age 58) w/c/o of recurrent transient episodes (about 5 minutes each) of R-sided weakness, slurred speech, and blurry vision. MRI H 11/26/18 reported acute left pontine lacunar infarct and chronic small vessel ischemic changes. CTA H/N was significant for short segment critical stenosis of mid basilar artery (non-calcified filling defect), but distal BA was opacified 2/2 collateral circulation from R PCOM. There was also mild stenoses of b/l PCAs. Noted in Neuro consult note on 11/25/18 that case was discussed with Dr. Munguia of NI Team and no acute NI warranted at that time. At that time, he was discharged on ASA 81, Plavix 75 and Lipitor 80. He had CAD s/p ANTONIO stent to mid RCA in 12/2018 after admission for chest pain and was then placed on ASA 81 and Brilinta 90 BID.  \par \par Since, he has been following with Dr. Mikey Delgado for his BA stenosis. Last CTA H/N on 12/6/2020 reported stable short segment severe stenosis of the mid BA. \par \par Today, he presents and denies any neurological complaints aside from some tingling for his R lip and tip of tongue since his stroke in 2018. He admits to lack of exercise. \par \par She follows with Dietician Bryanna Bedolla at [facility] who has helped him lose 50 lbs.

## 2022-04-06 NOTE — PHYSICAL EXAM
[FreeTextEntry1] : NIH STROKE SCALE \par \par Item	 Score \par \par 1 a.	Level of Consciousness	 0 \par 1 b.          LOC Questions	 0 \par 1 c.	LOC Commands	 0 \par 2.	Best Gaze	 0 \par 3.	Visual	                 0 \par 4.	Facial Palsy              0 \par 5 a.	Motor Arm - Left	 0 \par 5 b.	Motor Arm - Right	 0 \par 6 a.	Motor Leg - Left	 0 \par 6 b.	Motor Leg - Right	 0 \par 7.	Limb Ataxia	 0 \par 8.	Sensory	                 0 \par 9.	Language	 0 \par 10.	Dysarthria	 0 \par 11.	Extinction and Inattention 	 0 \par __________________________________________ \par \par TOTAL	 0 \par \par mRS: 0 No symptoms at all \par \par \par Neurologic Exam: \par \par Mental status: Awake, alert and oriented x 4. Recent and remote memory intact.  \par \par Language: Follows all commands. Naming, repetition and comprehension intact. Attention/concentration intact. No dysarthria, no aphasia. Fund of knowledge appropriate.  \par \par Cranial nerves: Pupils equally round and reactive to light, visual fields full, no nystagmus, EOMI, face symmetric, hearing intact bilaterally, palate elevation symmetric, tongue was midline, sternocleidomastoid/ shoulder shrug strength bilaterally 5/5.  \par \par Motor: No drifting in all extremities. Normal bulk and tone, strength 5/5 in bilateral upper and lower extremities.  strength 5/5.  \par \par Sensation: Intact to light touch. No neglect.  \par \par Coordination: No dysmetria on finger-to-nose and heel-to-shin.  \par \par Gait: Steady.\par

## 2022-04-06 NOTE — ASSESSMENT
[FreeTextEntry1] : Patient is 61 yo RH man with vascular risk factors of former smoker, RA, DM, HTN, HLD and has PMHx of CAD s/p stent (2/2 blood clot after he had stroke in 2018) and AAA (just under 5mm) who presents for initial evaluation of short segment critical stenosis of mid basilar artery initially identified in 2018 during stroke work up, referred by Dr. Mikey Delgado to clinic today. Initial presentation included recurrent transient episodes (about 5 minutes each) of R-sided weakness, slurred speech, and blurry vision with finding of L pontine lacunar infarct like due to paramedian small vessel occlusion. When comparing the CTA from 2018 to that in 2020, there are no significant changes in the basilar artery stenosis. However, he does c/o of recurrent transient intermittent R lip and tip of tongue tingling that may be related to ischemia. He is on Brilinta 90 BID and ASA 81 per cardiology, and admits to nose bleeds that may last for three days at times.\par \par I have reviewed the imaging in detail and spoken extensively with the patient in layman language.  All of the patient's questions were answered to satisfaction.\par \par IN BRIEF: PT W/H/O RA, DM, HTN, HLD, PONTINE PARAMEDIAN LACUNE, MID BASIALAR SIGNIFICANT STENOSIS IN THE PRESENCE OF A MODERATE SIZE RT PCOM, AND CORONARY STENT IN 2018, WHO NEUROLOGICALLY HAS BEEN STABLE FOR PAST FEW YEARS AND HAS BEEN ACTIVELY WORKING ON LIFESTYLE AND RISK FACTOR MODIFICATION IS IN THE CLINIC FOR F/UP OF HIS BA STENOSIS. IN COMPARING THE 2018 TO 2020 CTA NO SIGNIFICANT CHANGES WE WERE ABLE TO APPRECIATE IN TERMS OF MID BASILAR STENOSIS. I REVIEWED THE CTAs AND BRAIN MRI WITH THE PATIENT AND EXPLAINED IN LAYMAN LANGUAGE THE FINDINGS AND CONCERNS. PER THE PATIENT'S STATEMENT, HE HAS BEEN ON BRILINTA AND ASA FOR YEARS, PER DISCUSSION OF HIS CARDIOLOGIST AND STROKE DOCTOR, AND I AM NOT SURE WHY FOR THAT MANY YEARS.\par \par PLAN:\par -MRI H to look for new silent stroke OR VASCULAR RELATED DISEASES.  \par -MRA NOVA with attention to BA blood flow \par -Afterwards, we would be happy to speak to his cardiologist regarding risks of bleeding because he is on DAPT\par -Maximal medical/Risk factor management and Lifestyle modification per SAMMPRIS Trial by PCP/Neurologist/Stroke and TIA Clinic (For example, Mediterranean diet, weight loss, aerobic exercise, smoking cessation etc.)\par -Avoid dehydration, hypotension, anemia, and hypoxemia \par -Continue to f/u with Dietician \par -Instructed patient to call 911 or report to ED if any acute neurological changes occur\par \par \par \par \par

## 2022-05-06 ENCOUNTER — APPOINTMENT (OUTPATIENT)
Dept: VASCULAR SURGERY | Facility: CLINIC | Age: 62
End: 2022-05-06
Payer: MEDICARE

## 2022-05-06 VITALS
WEIGHT: 290 LBS | BODY MASS INDEX: 37.22 KG/M2 | DIASTOLIC BLOOD PRESSURE: 81 MMHG | SYSTOLIC BLOOD PRESSURE: 131 MMHG | HEIGHT: 74 IN

## 2022-05-06 PROCEDURE — 99214 OFFICE O/P EST MOD 30 MIN: CPT

## 2022-05-09 ENCOUNTER — APPOINTMENT (OUTPATIENT)
Dept: CARDIOLOGY | Facility: CLINIC | Age: 62
End: 2022-05-09
Payer: MEDICARE

## 2022-05-09 VITALS
BODY MASS INDEX: 37.47 KG/M2 | SYSTOLIC BLOOD PRESSURE: 112 MMHG | DIASTOLIC BLOOD PRESSURE: 70 MMHG | TEMPERATURE: 97.4 F | HEART RATE: 88 BPM | HEIGHT: 74 IN | WEIGHT: 292 LBS

## 2022-05-09 VITALS — DIASTOLIC BLOOD PRESSURE: 80 MMHG | SYSTOLIC BLOOD PRESSURE: 128 MMHG

## 2022-05-09 DIAGNOSIS — R07.89 OTHER CHEST PAIN: ICD-10-CM

## 2022-05-09 PROCEDURE — 93000 ELECTROCARDIOGRAM COMPLETE: CPT

## 2022-05-09 PROCEDURE — 99213 OFFICE O/P EST LOW 20 MIN: CPT

## 2022-05-09 RX ORDER — LEVOTHYROXINE SODIUM 0.12 MG/1
125 TABLET ORAL DAILY
Qty: 30 | Refills: 1 | Status: COMPLETED | COMMUNITY
End: 2022-05-09

## 2022-05-09 RX ORDER — TICAGRELOR 90 MG/1
90 TABLET ORAL TWICE DAILY
Qty: 60 | Refills: 0 | Status: COMPLETED | COMMUNITY
Start: 2022-02-07 | End: 2022-05-09

## 2022-05-09 RX ORDER — SITAGLIPTIN AND METFORMIN HYDROCHLORIDE 50; 1000 MG/1; MG/1
TABLET, FILM COATED ORAL
Refills: 0 | Status: COMPLETED | COMMUNITY
End: 2022-05-09

## 2022-05-09 RX ORDER — LEVOTHYROXINE SODIUM 0.17 MG/1
175 TABLET ORAL DAILY
Refills: 0 | Status: ACTIVE | COMMUNITY

## 2022-05-09 NOTE — PHYSICAL EXAM
[Well Developed] : well developed [Well Nourished] : well nourished [Normal Conjunctiva] : normal conjunctiva [No Carotid Bruit] : no carotid bruit [Normal S1, S2] : normal S1, S2 [No Murmur] : no murmur [No Rub] : no rub [No Gallop] : no gallop [Clear Lung Fields] : clear lung fields [Good Air Entry] : good air entry [No Respiratory Distress] : no respiratory distress  [Moves all extremities] : moves all extremities [No Focal Deficits] : no focal deficits [No ulcers] : no ulcers [No edema] : no edema

## 2022-05-09 NOTE — ASSESSMENT
[FreeTextEntry1] : Assessment:\par #Chest pain\par - Diaphoretic with CP after walking\par #CAD s/p RCA PCI 12/2018\par #AAA 4.8 cm 3/2022\par - Unchanged from 3/2021 to 3/2022\par #Basilar artery stenosis and PCA stenosis\par - Pending MRI/MRA with Dr. Munguia\par #HTN\par - /80\par - Goal SBP<110 with AAA, however his goal is SBP given BA and PCA stenosis \par #Hxo carotid stenosis\par #DLD\par #BMI 37\par \par Plan:\par - Recommend pharmacologic nuclear stress test given symptoms and history of CAD\par - Continue ASA 81 mg, Brilinta 90 mg BID (continue for now while doing ischemic w/u), atorvastatin 80\par - Continue ramipril 10 mg daily and diltiazem 360 mg daily \par - No weight lifting\par - Referral to dietician on [place]\par - RTC 3 months, check lipid profile\par

## 2022-05-09 NOTE — HISTORY OF PRESENT ILLNESS
[FreeTextEntry1] : 62M with CAD s/p RCA PCI, AAA, HTN, T2DM, CVA, DLD, basilar artery stenosis here for followup of CAD, HTN, DLD. \par \par 22\par Working to lose weight. Lost 17 lbs. Reading labels. Avoiding added sugar. Weight loss has slowed because of recent sciatica flare. \par \par Had recent chest pain episode after touring [facility] with his daughter. They had walked most of the day and at the end of the day they were going to the bookstore when we had chest pain and diaphoresis. No associated SOB, dizziness. Scared him. Improved after 7-8 minutes of rest. No recurrence. \par \par 22\par \par No chest pain, SOB, palpitations, LE edema. \par \par AAA incidentally found 20 years ago. Previously following with Dr. Raygoza. \par \par Joined [facility] last month. Trying to lose weight. Resistance training (>150 lbs) yesterday. \par \par Quit smoking \par \par No known family hxo aortic aneurysm, however grandfather  suddenly at 65 yo. Diagnosis unknown\par \par Single dad to 18 yo and 15 yo\par \par Increased stress raising kids by himself. States he is high functioning, but depressed.

## 2022-05-09 NOTE — REVIEW OF SYSTEMS
[Weight Gain (___ Lbs)] : [unfilled] ~Ulb weight gain [Fever] : no fever [Chills] : no chills [Blurry Vision] : no blurred vision [SOB] : no shortness of breath [Dyspnea on exertion] : not dyspnea during exertion [Chest Discomfort] : chest discomfort [Lower Ext Edema] : no extremity edema [Leg Claudication] : no intermittent leg claudication [Palpitations] : no palpitations [Orthopnea] : no orthopnea [PND] : no PND [Syncope] : no syncope [Cough] : no cough [Dizziness] : no dizziness [Weakness] : no weakness

## 2022-05-10 ENCOUNTER — RESULT REVIEW (OUTPATIENT)
Age: 62
End: 2022-05-10

## 2022-05-10 ENCOUNTER — OUTPATIENT (OUTPATIENT)
Dept: OUTPATIENT SERVICES | Facility: HOSPITAL | Age: 62
LOS: 1 days | Discharge: HOME | End: 2022-05-10
Payer: MEDICARE

## 2022-05-10 DIAGNOSIS — Z90.49 ACQUIRED ABSENCE OF OTHER SPECIFIED PARTS OF DIGESTIVE TRACT: Chronic | ICD-10-CM

## 2022-05-10 DIAGNOSIS — Z98.890 OTHER SPECIFIED POSTPROCEDURAL STATES: Chronic | ICD-10-CM

## 2022-05-10 DIAGNOSIS — I65.1 OCCLUSION AND STENOSIS OF BASILAR ARTERY: ICD-10-CM

## 2022-05-10 PROCEDURE — 70547 MR ANGIOGRAPHY NECK W/O DYE: CPT | Mod: 26

## 2022-05-10 PROCEDURE — 70544 MR ANGIOGRAPHY HEAD W/O DYE: CPT | Mod: 26,59

## 2022-05-10 PROCEDURE — 70551 MRI BRAIN STEM W/O DYE: CPT | Mod: 26

## 2022-05-23 ENCOUNTER — OUTPATIENT (OUTPATIENT)
Dept: OUTPATIENT SERVICES | Facility: HOSPITAL | Age: 62
LOS: 1 days | Discharge: HOME | End: 2022-05-23

## 2022-05-23 ENCOUNTER — APPOINTMENT (OUTPATIENT)
Dept: NUTRITION | Facility: CLINIC | Age: 62
End: 2022-05-23

## 2022-05-23 DIAGNOSIS — Z90.49 ACQUIRED ABSENCE OF OTHER SPECIFIED PARTS OF DIGESTIVE TRACT: Chronic | ICD-10-CM

## 2022-05-23 DIAGNOSIS — Z98.890 OTHER SPECIFIED POSTPROCEDURAL STATES: Chronic | ICD-10-CM

## 2022-05-26 ENCOUNTER — OUTPATIENT (OUTPATIENT)
Dept: OUTPATIENT SERVICES | Facility: HOSPITAL | Age: 62
LOS: 1 days | Discharge: HOME | End: 2022-05-26
Payer: MEDICARE

## 2022-05-26 ENCOUNTER — RESULT REVIEW (OUTPATIENT)
Age: 62
End: 2022-05-26

## 2022-05-26 DIAGNOSIS — Z98.890 OTHER SPECIFIED POSTPROCEDURAL STATES: Chronic | ICD-10-CM

## 2022-05-26 DIAGNOSIS — R07.89 OTHER CHEST PAIN: ICD-10-CM

## 2022-05-26 DIAGNOSIS — Z90.49 ACQUIRED ABSENCE OF OTHER SPECIFIED PARTS OF DIGESTIVE TRACT: Chronic | ICD-10-CM

## 2022-05-26 PROCEDURE — 78452 HT MUSCLE IMAGE SPECT MULT: CPT | Mod: 26,MH

## 2022-05-27 ENCOUNTER — APPOINTMENT (OUTPATIENT)
Dept: NEUROLOGY | Facility: CLINIC | Age: 62
End: 2022-05-27
Payer: MEDICARE

## 2022-05-27 VITALS
SYSTOLIC BLOOD PRESSURE: 127 MMHG | BODY MASS INDEX: 37.86 KG/M2 | TEMPERATURE: 98.5 F | DIASTOLIC BLOOD PRESSURE: 86 MMHG | WEIGHT: 295 LBS | HEART RATE: 95 BPM | HEIGHT: 74 IN | OXYGEN SATURATION: 99 %

## 2022-05-27 PROCEDURE — 99214 OFFICE O/P EST MOD 30 MIN: CPT

## 2022-05-27 NOTE — HISTORY OF PRESENT ILLNESS
[FreeTextEntry1] : Patient is 63 yo RH man with vascular risk factors of former smoker, RA, DM, HTN, HLD and L pontine paramedian infarct who presents for follow up for short segment critical stenosis of mid basilar artery initially identified in 2018 during stroke work up, referred by Dr. Mikey Delgado to  clinic.  Initial presentation included recurrent transient episodes (about 5 minutes each) of R-sided weakness, slurred speech, and blurry vision. When comparing the CTA from 2018 to that in 2020, there were no significant changes in the basilar artery stenosis. However, last visit, he c/o of recurrent transient intermittent R lip and tip of tongue tingling that may be related to ischemia. He is on Brilinta 90 BID and ASA 81 per cardiology, and admits to nose bleeds that may last for three days at times. We asked him to obtain MRI H and MRA NOVA scan, which was completed on 5/10/22, results below. \par \par Today he states the R lip and tip of tongue tingling is very subtle and doesn't occur often. He reports new Left inner ankle to mid inner calf "painful numbness."  He's had no further nose bleeds on Brilinita 90 BID/ASA 81. He saw his cardiologist, got nuclear stress test. He continues to follow with Dietician and is eating healthy diet and doing daily exercise. \par ------------------------------------------------------------------------\par NOVA: \par Right side: \par CCA: 475 \par PATY A1: 88, A2: 70 \par MCA: 97 \par P-comm: 45 \par \par Left side: \par CCA: 472 \par ICA: 197 \par PATY A1 51, A2: 87 \par MCA: 156 \par \par Vertebral basilar: \par RVA: 40 \par LVA: 95 \par Basilar artery: 92 \par Right PCA: 45 \par Left PCA: 60 \par \par IMPRESSION: \par 1. Moderate basilar artery stenosis \par 2. NOVA flow quantification (mL/min) indicates the basilar artery stenosis is not flow-limiting. There is satisfactory flow to the posterior cerebral arteries contributed to by a prominent right P-comm (not fetal origin) \par 3. Right vertebral artery terminates in a PICA \par 4. Normal anterior circulation flow. Slight variation in flow to the right MCA probably due to the prominent P-comm giving supply to the posterior circulation \par 5. Patent carotid bifurcations \par 6. Moderate to severe microvascular ischemic change in the brain

## 2022-05-27 NOTE — ASSESSMENT
[FreeTextEntry1] : Patient is 61 yo RH man with vascular risk factors of former smoker, RA, DM, HTN, HLD and L pontine paramedian infarct who presents for follow up for short segment critical stenosis of mid basilar artery initially identified in 2018 during stroke work up and CTA imaging from 2018 to 2020 showed no significant changes in the BA stenosis. Last visit, he c/o of recurrent transient intermittent R lip and tip of tongue tingling that may be related to ischemia. We asked him to obtain MRI H and MRA NOVA scan. MRI H did not report any new DWI or FLAIR changes. MRA NOVA showed that R PCOM flow is 45mL/min going towards BA. BA has antegrade flow of 92mL/min. For his age, based on the NOVA report, his is getting sufficient flow through the BA. Given that he is clinically stable and MRI H has failed to show new ischemic changes in the posterior circulation, we recommend he continue with medical management per [facility]. \par \par PLAN:\par -Repeat MRA NOVA scan in 1 to 1.5 years\par -No NI procedure warranted at this time\par -Maximal medical/Risk factor management and Lifestyle modification per [facility] Trial by Dr. Esqueda (For example, Mediterranean diet, weight loss, aerobic exercise, smoking cessation etc.)\par -With chronic DAPT usage, there is increased risk of bleeding. We defer to Dr. Mikey Delgado for DAPT management. \par -Avoid dehydration, hypotension, anemia, and hypoxemia \par -Continue to f/u with dietician and weight loss \par -Instructed patient to call 911 or report to ED if any acute neurological changes occur\par \par \par \par \par

## 2022-05-27 NOTE — END OF VISIT
[FreeTextEntry3] : Patient seen and examined by me with the above mentioned ACP and agree with above.\par Reviewed imaging and records personally.\par Patient NIHSS 0.\par Plan as above.\par \par

## 2022-07-19 ENCOUNTER — APPOINTMENT (OUTPATIENT)
Dept: NUTRITION | Facility: CLINIC | Age: 62
End: 2022-07-19

## 2022-08-29 ENCOUNTER — APPOINTMENT (OUTPATIENT)
Dept: CARDIOLOGY | Facility: CLINIC | Age: 62
End: 2022-08-29

## 2022-08-29 VITALS
BODY MASS INDEX: 37.6 KG/M2 | SYSTOLIC BLOOD PRESSURE: 122 MMHG | WEIGHT: 293 LBS | HEIGHT: 74 IN | HEART RATE: 94 BPM | DIASTOLIC BLOOD PRESSURE: 74 MMHG

## 2022-08-29 DIAGNOSIS — M25.559 PAIN IN UNSPECIFIED HIP: ICD-10-CM

## 2022-08-29 DIAGNOSIS — E11.9 TYPE 2 DIABETES MELLITUS W/OUT COMPLICATIONS: ICD-10-CM

## 2022-08-29 PROCEDURE — 93000 ELECTROCARDIOGRAM COMPLETE: CPT

## 2022-08-29 PROCEDURE — 99214 OFFICE O/P EST MOD 30 MIN: CPT

## 2022-08-29 RX ORDER — SITAGLIPTIN AND METFORMIN HYDROCHLORIDE 50; 1000 MG/1; MG/1
50-1000 TABLET, FILM COATED ORAL
Qty: 60 | Refills: 3 | Status: DISCONTINUED | COMMUNITY
End: 2022-08-29

## 2022-08-30 NOTE — HISTORY OF PRESENT ILLNESS
[FreeTextEntry1] : 62M with CAD s/p RCA PCI, AAA, HTN, T2DM, CVA, DLD, basilar artery stenosis here for followup of CAD, HTN, DLD. \par \par 22\par No further episodes of chest pain. No shortness of breath, palpitations, lightheadedness/dizziness, pre-syncope/syncope, or lower extremity edema. \par \par Rhinorrhea 3-4 times per day. Sneezing. No congestion. \par \par Continues to struggle with eating. Starting to make lifestyle changes. 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 Lost 17 lbs. Reading labels. Avoiding added sugar. Weight loss has slowed because of recent sciatica flare. \par \par Had recent chest pain episode after touring [facility] with his daughter. They had walked most of the day and at the end of the day they were going to the bookstore when we had chest pain and diaphoresis. No associated SOB, dizziness. Scared him. Improved after 7-8 minutes of rest. No recurrence. \par \par 22\par \par No chest pain, SOB, palpitations, LE edema. \par \par AAA incidentally found 20 years ago. Previously following with Dr. Raygoza. \par \par Joined [facility] last month. Trying to lose weight. Resistance training (>150 lbs) yesterday. \par \par Quit smoking \par \par No known family hxo aortic aneurysm, however grandfather  suddenly at 65 yo. Diagnosis unknown\par \par Single dad to 18 yo and 15 yo\par \par Increased stress raising kids by himself. States he is high functioning, but depressed.

## 2022-08-30 NOTE — PHYSICAL EXAM
[Well Developed] : well developed [Well Nourished] : well nourished [Normal Conjunctiva] : normal conjunctiva [No Carotid Bruit] : no carotid bruit [Normal S1, S2] : normal S1, S2 [No Murmur] : no murmur [No Rub] : no rub [No Gallop] : no gallop [Clear Lung Fields] : clear lung fields [Good Air Entry] : good air entry [No Respiratory Distress] : no respiratory distress  [Moves all extremities] : moves all extremities [No Focal Deficits] : no focal deficits [No ulcers] : no ulcers [No edema] : no edema [Present] : present bilaterally

## 2022-08-30 NOTE — REVIEW OF SYSTEMS
[Weight Gain (___ Lbs)] : [unfilled] ~Ulb weight gain [Fever] : no fever [Chills] : no chills [Blurry Vision] : no blurred vision [SOB] : no shortness of breath [Dyspnea on exertion] : not dyspnea during exertion [Chest Discomfort] : no chest discomfort [Lower Ext Edema] : no extremity edema [Leg Claudication] : no intermittent leg claudication [Palpitations] : no palpitations [Orthopnea] : no orthopnea [PND] : no PND [Syncope] : no syncope [Cough] : no cough [Dizziness] : no dizziness [Weakness] : no weakness

## 2022-08-30 NOTE — ASSESSMENT
[FreeTextEntry1] : Assessment:\par #CAD s/p RCA PCI 12/2018\par #AAA 4.8 cm 3/2022\par - Unchanged from 3/2021 to 3/2022\par #Basilar artery stenosis and PCA stenosis\par - Medical management recommended and surveillance MRA NOVA planned by Dr. Munguia\par #Hxo carotid stenosis\par #HTN\par - Goal SBP<110 with AAA, however his goal is SBP 120s given BA and PCA stenosis \par - 7/22/22 Cr 1.57, K 5.2\par #DLD\par - 12/2021 , , HDL 30, LDL 72\par - 7/22/22 , , HDL 37, LDL 70, AST 17, ALT 21 on atorva 80\par - TG not at goal\par #BMI 37\par #T2DM, uncontrolled \par - 7/22/22 Hgb A1c 8.5%\par #Chest pain - resolved\par - One episode of diaphoresis and CP after walking\par -  pharmacologic nuclear stress test 7/5/22 negative, no perfusion defects\par \par Plan:\par - Check LE arterial US to rule out popliteal artery aneurysm and evaluate for PAD, check ROLANDO/PVR\par - Okay to discontinue Brilinta from a CAD standpoint, the coronary drug eluting stent was placed >1 year ago\par - Recommend starting Vascepa for hypertriglyceridemia, repeat lipid profile 12 weeks after starting\par - Continue ASA 81 mg and atorvastatin 80 mg qHS\par - Continue ramipril 10 mg daily and diltiazem 360 mg daily \par - No weight lifting\par - Counseled patient on diet and exercise\par - RTC 3 months\par

## 2022-08-30 NOTE — CARDIOLOGY SUMMARY
[de-identified] : EKG 8/29/22 Normal sinus rhythm 94 bpm\par EKG 5/9/22 Normal sinus rhythm 87 bpm [de-identified] : Pharmacologic nuclear stress test 7/5/22 negative, no perfusion defects

## 2022-09-26 ENCOUNTER — APPOINTMENT (OUTPATIENT)
Dept: NEUROLOGY | Facility: CLINIC | Age: 62
End: 2022-09-26

## 2022-09-26 VITALS
HEIGHT: 74 IN | BODY MASS INDEX: 37.73 KG/M2 | WEIGHT: 294 LBS | HEART RATE: 80 BPM | OXYGEN SATURATION: 99 % | SYSTOLIC BLOOD PRESSURE: 146 MMHG | DIASTOLIC BLOOD PRESSURE: 91 MMHG

## 2022-09-26 DIAGNOSIS — M54.16 RADICULOPATHY, LUMBAR REGION: ICD-10-CM

## 2022-09-26 PROCEDURE — 99214 OFFICE O/P EST MOD 30 MIN: CPT

## 2022-09-26 NOTE — DISCUSSION/SUMMARY
[FreeTextEntry1] : Mr. Perez is a 61yo man with history of stroke and posterior circulation stenosis.  He has been stable and he has been on aspirin and Brilinta for >3 years.  He was started for his cardiac stent and left on because of his basilar artery stenosis (?).  There is no evidence to continue long term aspirin and Brilinta.  He will be switched to Plavix only as he was on aspirin when he had his original stroke and heart attack.\par \par 1. Will stop aspirin and Brilinta and switch to plavix 75mg daily\par 2. Continue monitoring LDL with goal <70 \par 3. Continue to work with primary doctor to optimize glycemic control\par 4. Educated about healthy diet and adequate hydration\par 5. Discussed weight loss and exercise

## 2022-09-26 NOTE — HISTORY OF PRESENT ILLNESS
[FreeTextEntry1] : Mr. Perez is a 59yo man being seen for follow up of his stroke and posterior circulation stenosis. He has had followup Vascular imaging showing stable basilar artery stneosis and MR NOVA showing good flow through his basilar artery.\par He is compliant with Brilinta and aspirin\par Says cholesterol is good\par Glucose and a1c not well controlled\par \par Initial switch to Brilinta and aspirin was with cardiology after his MI and stent\par \par

## 2022-09-26 NOTE — PHYSICAL EXAM
[FreeTextEntry1] : MS: Awake, alert, oriented to person, place, situation and time.  Follows commands. \par \par Language: Speech is clear, fluent. No dysarthria. \par \par CNs 2 - 12 intact. EOMI no nystagmus, no diplopia. No facial asymmetry b/l. Tongue midline, normal movements, no atrophy.\par \par Motor: Normal muscle bulk & tone. No noticeable tremor or seizure. No pronator drift. Muscle strength of b/l UE and b/l LE 5/5. \par \par Sensation: Intact to LT b/l throughout\par \par Cortical: No extinction\par \par Coordination: Intact rapid-alternating movements. No dysmetria to FTN. \par \par DTR: 2+ in biceps, brachioradialis and triceps; 1+ in patellar and ankle; plantars are down b/l\par \par Gait: No postural instability. Normal stance and tandem gait.\par \par General: Alert and oriented to person, place, time, and situation. In no acute distress. Cooperative. Follows commands. \par Eyes: Sclera and conjunctiva were normal and pupils were equal in size, round, reactive to light, with normal accommodation\par ENT: Ears and nose normal in appearance. \par Vascular: No peripheral edema.\par Respiratory: No visible respiratory distress. \par Musculoskeletal: No involuntary movements were seen.\par Skin: Normal skin color and pigmentation.\par \par Item score\par 1a. Level of Consciousness    0\par 1b. LOC Questions                  0\par 1c. LOC Commands                 0\par 2. Best Gaze                            0\par 3. Visual                                   0\par 4. Facial Palsy                          0\par 5a. Motor Arm - Left                 0\par 5b. Motor Arm - Right               0\par 6a. Motor Leg - Left                  0\par 6b. Motor Leg - Right                0\par 7. Limb Ataxia                           0\par 8. Sensory                                0\par 9. Language                              0\par 10. Dysarthria                           0\par 11. Extinction and Inattention    0\par -------------------------------------------------------\par Total: 0\par

## 2022-09-28 ENCOUNTER — APPOINTMENT (OUTPATIENT)
Dept: CARDIOLOGY | Facility: CLINIC | Age: 62
End: 2022-09-28

## 2022-09-28 PROCEDURE — 93923 UPR/LXTR ART STDY 3+ LVLS: CPT

## 2022-09-28 PROCEDURE — 93925 LOWER EXTREMITY STUDY: CPT

## 2022-11-02 ENCOUNTER — LABORATORY RESULT (OUTPATIENT)
Age: 62
End: 2022-11-02

## 2022-11-03 ENCOUNTER — NON-APPOINTMENT (OUTPATIENT)
Age: 62
End: 2022-11-03

## 2022-11-17 ENCOUNTER — APPOINTMENT (OUTPATIENT)
Dept: ENDOCRINOLOGY | Facility: CLINIC | Age: 62
End: 2022-11-17

## 2022-11-17 VITALS
HEIGHT: 74 IN | WEIGHT: 301 LBS | DIASTOLIC BLOOD PRESSURE: 88 MMHG | HEART RATE: 82 BPM | TEMPERATURE: 97.6 F | SYSTOLIC BLOOD PRESSURE: 130 MMHG | OXYGEN SATURATION: 98 % | BODY MASS INDEX: 38.63 KG/M2

## 2022-11-17 PROCEDURE — 99204 OFFICE O/P NEW MOD 45 MIN: CPT

## 2022-11-17 NOTE — ASSESSMENT
[Diabetes Foot Care] : diabetes foot care [Long Term Vascular Complications] : long term vascular complications of diabetes [Carbohydrate Consistent Diet] : carbohydrate consistent diet [Exercise/Effect on Glucose] : exercise/effect on glucose [Hypoglycemia Management] : hypoglycemia management [Self Monitoring of Blood Glucose] : self monitoring of blood glucose [Retinopathy Screening] : Patient was referred to ophthalmology for retinopathy screening [Weight Loss] : weight loss [FreeTextEntry1] : Mr. JORI BOYKIN  Is  a 62 year  old male  with CAD s/p stent, stroke and  basilar artery stenosis , post ablative hypothyroidism for Grave's disease  who presented for evaluation of type 2 Diabetes:\par \par \par # type 2 DM / DL/ Obesity \par - 7/2022: A1c 8.5% now  Ozempic 1 mg Q week ( started around 7/2022) , metformin 1000 mg BID, glimepiride 4 mg BID \par - discussed benefits of Glp1 agonist given his CAD and CV history and weight loss benefits as well as hypoglycemia risk  and weight gain with Glimepiride \par - increase ozempic to 2 mg Q week and decrease glimepiride to OD ( with dinner ) if hypo occur stop glimepiride \par - continue metformin now , will need new lbs to check Gfr and adjust dose if needed \par - continue Statin and fish oil \par - need f/u with opthalmology and advised to see podiatry \par - will benefit from CGM for Bg monitoring given hypoglycemia ?  , interested in DEXCOM \par \par # post ablative hypothyroidism \par - tft's ok on lt4 175 mcg daily \par - continue same no hypo/hyperthyroid symptoms \par

## 2022-11-17 NOTE — PHYSICAL EXAM
[Alert] : alert [Healthy Appearance] : healthy appearance [No Acute Distress] : no acute distress [Thyroid Not Enlarged] : the thyroid was not enlarged [No Respiratory Distress] : no respiratory distress [No Accessory Muscle Use] : no accessory muscle use [Clear to Auscultation] : lungs were clear to auscultation bilaterally [Normal S1, S2] : normal S1 and S2 [No Murmurs] : no murmurs [Regular Rhythm] : with a regular rhythm [No Edema] : no peripheral edema [Not Tender] : non-tender [Not Distended] : not distended [Soft] : abdomen soft [No CVA Tenderness] : no ~M costovertebral angle tenderness [No Stigmata of Cushings Syndrome] : no stigmata of Cushings Syndrome [Right foot was examined, including] : right foot ~C was examined, including visual inspection with sensory and pulse exams [Left foot was examined, including] : left foot ~C was examined, including visual inspection with sensory and pulse exams [2+] : 2+ in the dorsalis pedis [No Tremors] : no tremors [Oriented x3] : oriented to person, place, and time [Obese] : obese [Abdominal Striae] : no abdominal striae [Acanthosis Nigricans] : no acanthosis nigricans [Swelling] : not swollen [Tenderness] : not tender [Erythema] : not erythematous [de-identified] : left eye proptosis right ok  [de-identified] : hernia?

## 2022-11-17 NOTE — HISTORY OF PRESENT ILLNESS
[FreeTextEntry1] : Mr. JORI BOYKIN  Is  a 62 year  old male  with CAD s/p stent, stroke and  basilar artery stenosis , post ablative hypothyroidism for Grave's disease  who presented for evaluation of type 2 Diabetes:\par \par \par Diagnosis: around 6 years ago \par Current Regimen: Ozempic 1 mg Q week ( started around 7/2022) , metformin 1000 mg BID, glimepiride 4 mg BID \par Previous regimens: none \par Compliance: ok \par SMBG/CGM :  not checking \par Hypoglycemia: recall one or 2 episodes \par Polyuria/polydipsia : no \par Weight change/BMI:  lost 10 lbs since on ozempic \par Diet: doesn't watch \par Exercise: no \par HBa1c trend:   8.5%( 11/2022) \par \par \par prevention  \par Statin: Lipitor 80 mg daily , fish oil 2 g BID \par ACE/ARB :  ramipril 10 mg BID \par Eye examination:  9/2021\par Neuropathy: has back problems too , no pins and needles \par  \par  \par \par # post ablative  hypothyroidism\par - at the age of 16 had Grave's disease and hyperthyroidism treated with meds\par - at the age of 23 was able to get NAJERA and since that time hypothyroid\par - now on Lt4 175 mcg daily , good pill technique  , clinically euthyroid

## 2022-11-23 ENCOUNTER — APPOINTMENT (OUTPATIENT)
Dept: VASCULAR SURGERY | Facility: CLINIC | Age: 62
End: 2022-11-23
Payer: MEDICARE

## 2022-11-23 VITALS
BODY MASS INDEX: 38.5 KG/M2 | DIASTOLIC BLOOD PRESSURE: 71 MMHG | SYSTOLIC BLOOD PRESSURE: 129 MMHG | WEIGHT: 300 LBS | HEIGHT: 74 IN

## 2022-11-23 PROCEDURE — 93978 VASCULAR STUDY: CPT

## 2022-11-23 PROCEDURE — 99213 OFFICE O/P EST LOW 20 MIN: CPT

## 2022-12-22 ENCOUNTER — APPOINTMENT (OUTPATIENT)
Dept: CARDIOLOGY | Facility: CLINIC | Age: 62
End: 2022-12-22

## 2022-12-29 ENCOUNTER — APPOINTMENT (OUTPATIENT)
Dept: CARDIOLOGY | Facility: CLINIC | Age: 62
End: 2022-12-29

## 2022-12-29 VITALS
HEART RATE: 91 BPM | SYSTOLIC BLOOD PRESSURE: 130 MMHG | BODY MASS INDEX: 38.63 KG/M2 | WEIGHT: 301 LBS | HEIGHT: 74 IN | OXYGEN SATURATION: 97 % | DIASTOLIC BLOOD PRESSURE: 78 MMHG

## 2022-12-29 PROCEDURE — 99214 OFFICE O/P EST MOD 30 MIN: CPT

## 2022-12-29 RX ORDER — SEMAGLUTIDE 1.34 MG/ML
4 INJECTION, SOLUTION SUBCUTANEOUS
Qty: 3 | Refills: 0 | Status: DISCONTINUED | COMMUNITY
Start: 2022-12-01 | End: 2022-12-29

## 2022-12-29 RX ORDER — SEMAGLUTIDE 1.34 MG/ML
4 INJECTION, SOLUTION SUBCUTANEOUS
Qty: 9 | Refills: 0 | Status: DISCONTINUED | COMMUNITY
Start: 2022-07-23 | End: 2022-12-29

## 2022-12-29 RX ORDER — ASPIRIN 81 MG
81 TABLET, DELAYED RELEASE (ENTERIC COATED) ORAL DAILY
Refills: 0 | Status: DISCONTINUED | COMMUNITY
End: 2022-12-29

## 2022-12-29 RX ORDER — TICAGRELOR 90 MG/1
90 TABLET ORAL
Qty: 180 | Refills: 3 | Status: DISCONTINUED | COMMUNITY
Start: 2021-04-20 | End: 2022-12-29

## 2022-12-29 NOTE — REVIEW OF SYSTEMS
[Fever] : no fever [Chills] : no chills [Blurry Vision] : no blurred vision [SOB] : no shortness of breath [Dyspnea on exertion] : not dyspnea during exertion [Chest Discomfort] : no chest discomfort [Lower Ext Edema] : no extremity edema [Leg Claudication] : no intermittent leg claudication [Palpitations] : no palpitations [Orthopnea] : no orthopnea [PND] : no PND [Syncope] : no syncope [Cough] : no cough [Dizziness] : no dizziness [Weakness] : no weakness

## 2022-12-29 NOTE — HISTORY OF PRESENT ILLNESS
[FreeTextEntry1] : 62M with CAD s/p RCA PCI, AAA, HTN, T2DM, CVA, DLD, basilar artery stenosis here for followup of CAD, HTN, DLD. \par \par 22 Pt is seen for f.u.  Denies SOB, no chest pain, no dizziness, no edema.  Brilinta/ ASA was D/brisa by Dr Esqueda pt was started on plavix tolerating it well.  Pt is mostly sedentary \par 22- PVR R  anterior Tibial artery disease  L distal sup Femoral artery and /or popiteal and L ant tibial arterial disease.   Blood work done today in am \par 22 US Abdominal Aorta AAA 4.5 cm \par \par 22\par No further episodes of chest pain. No shortness of breath, palpitations, lightheadedness/dizziness, pre-syncope/syncope, or lower extremity edema. \par \par Rhinorrhea 3-4 times per day. Sneezing. No congestion. \par \par Continues to struggle with eating. Starting to make lifestyle changes. 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 Lost 17 lbs. Reading labels. Avoiding added sugar. Weight loss has slowed because of recent sciatica flare. \par \par Had recent chest pain episode after touring [facility] with his daughter. They had walked most of the day and at the end of the day they were going to the bookstore when we had chest pain and diaphoresis. No associated SOB, dizziness. Scared him. Improved after 7-8 minutes of rest. No recurrence. \par \par 22\par \par No chest pain, SOB, palpitations, LE edema. \par \par AAA incidentally found 20 years ago. Previously following with Dr. Raygoza. \par \par Joined [facility] last month. Trying to lose weight. Resistance training (>150 lbs) yesterday. \par \par Quit smoking \par \par No known family hxo aortic aneurysm, however grandfather  suddenly at 63 yo. Diagnosis unknown\par \par Single dad to 18 yo and 15 yo\par \par Increased stress raising kids by himself. States he is high functioning, but depressed.

## 2022-12-29 NOTE — PHYSICAL EXAM
[Well Developed] : well developed [Well Nourished] : well nourished [Normal Conjunctiva] : normal conjunctiva [No Carotid Bruit] : no carotid bruit [Normal S1, S2] : normal S1, S2 [No Murmur] : no murmur [No Rub] : no rub [No Gallop] : no gallop [Clear Lung Fields] : clear lung fields [Good Air Entry] : good air entry [No Respiratory Distress] : no respiratory distress  [Moves all extremities] : moves all extremities [No Focal Deficits] : no focal deficits [No edema] : no edema [Present] : present bilaterally

## 2022-12-29 NOTE — CARDIOLOGY SUMMARY
[de-identified] : EKG 8/29/22 Normal sinus rhythm 94 bpm\par EKG 5/9/22 Normal sinus rhythm 87 bpm [de-identified] : Pharmacologic nuclear stress test 7/5/22 negative, no perfusion defects [de-identified] : 09/28/22- PVR R  anterior Tibial artery disease  L distal sup Femoral artery and /or popiteal and L ant tibial arterial disease.   Blood \par LE ART US 9/28/22 mild to moder diffuse athero b/l, no pop art aneurysm\par 11/23/22 US Abdominal Aorta AAA 4.5 cm

## 2022-12-29 NOTE — ASSESSMENT
[FreeTextEntry1] : Assessment:\par #CAD s/p RCA PCI 12/2018\par #AAA 4.5 cm 11/23/22\par - Unchanged from 3/2021 to 3/2022\par #Basilar artery stenosis and PCA stenosis\par - Medical management recommended\par #Hxo carotid stenosis\par #HTN\par - Goal SBP<110 with AAA, however his goal is SBP 120s given BA and PCA stenosis \par - 7/22/22 Cr 1.57, K 5.2\par #DLD\par - 12/2021 , , HDL 30, LDL 72\par - 7/22/22 , , HDL 37, LDL 70, AST 17, ALT 21 on atorva 80\par - TG not at goal\par #BMI 37\par #T2DM, uncontrolled \par - 7/22/22 Hgb A1c 8.5%\par # PAD \par \par Plan:\par - Cont Plavix, Brilinta/ ASA D/brisa \par - Cont  Vascepa for hypertriglyceridemia,  Labs done today, results pending \par - Cont atorvastatin 80 mg qHS\par - Continue ramipril 10 mg daily and diltiazem 360 mg daily \par - No weight lifting\par - Counseled patient on diet and exercise, encouarged him to return to the gym\par - RTC 6 months\par

## 2023-02-27 ENCOUNTER — APPOINTMENT (OUTPATIENT)
Dept: ENDOCRINOLOGY | Facility: CLINIC | Age: 63
End: 2023-02-27
Payer: MEDICARE

## 2023-02-27 VITALS
BODY MASS INDEX: 39.53 KG/M2 | WEIGHT: 308 LBS | HEART RATE: 85 BPM | SYSTOLIC BLOOD PRESSURE: 128 MMHG | DIASTOLIC BLOOD PRESSURE: 88 MMHG | OXYGEN SATURATION: 98 % | TEMPERATURE: 97.4 F | HEIGHT: 74 IN

## 2023-02-27 PROCEDURE — 99214 OFFICE O/P EST MOD 30 MIN: CPT

## 2023-02-27 NOTE — DATA REVIEWED
[FreeTextEntry1] : 7/2022: A1c 8.5 %TSH 0.46 glucose 189  crea 1.57  GFR 50  Tg 232  LDL 70 \par 12/2022:  A1c 7.2%  GFR 58  glucose 161 crea 1.4  LDL 57 TSH 0.43

## 2023-02-27 NOTE — ASSESSMENT
[Diabetes Foot Care] : diabetes foot care [Long Term Vascular Complications] : long term vascular complications of diabetes [Carbohydrate Consistent Diet] : carbohydrate consistent diet [Exercise/Effect on Glucose] : exercise/effect on glucose [Hypoglycemia Management] : hypoglycemia management [Self Monitoring of Blood Glucose] : self monitoring of blood glucose [Retinopathy Screening] : Patient was referred to ophthalmology for retinopathy screening [Weight Loss] : weight loss [FreeTextEntry1] : Mr. JORI BOYKIN  Is  a 63 year  old male  with CAD s/p stent, stroke and  basilar artery stenosis , post ablative hypothyroidism for Grave's disease  who presented for evaluation of type 2 Diabetes:\par \par \par # type 2 DM / DL/ Obesity \par -12/2022 A1c 7.2% on  metformin 1000 mg BID, glimepiride 4 mg OD , off Ozempic for aorund 6 weeks now ( was on back  order ) weight up \par  - STOP ozempic , start Mounjaro 5 mg Q week and increase to 7.5 mg Q week if tolerated \par - reduce metformin to 1000 mg OD ( GFR 58 ) \par - glimepiride 4 mg OD , reduce to half or off if low BG \par - continue Statin and fish oil \par - need f/u with opthalmology and advised to see podiatry \par - will benefit from CGM for Bg monitoring given hypoglycemia ?  , interested in DEXCOM but was denied , he will try yenni 3 \par \par # post ablative hypothyroidism \par - tft's ok on lt4 175 mcg daily \par - continue same no hypo/hyperthyroid symptoms \par

## 2023-02-27 NOTE — HISTORY OF PRESENT ILLNESS
[FreeTextEntry1] : Mr. JORI BOYKIN  Is  a 63 year  old male  with CAD s/p stent, stroke and  basilar artery stenosis , post ablative hypothyroidism for Grave's disease  who presented for follow up  evaluation of type 2 Diabetes:\par \par \par Diagnosis: around 6 years ago \par Current Regimen: Ozempic 1 mg Q week ( started around 7/2022) , metformin 1000 mg BID, glimepiride 4 mg OD , since last visit he was off ozmepic now 6 weeks as he was doubling on the 1 mg pen  \par Previous regimens: none \par Compliance: ok but lately missed th epzempic \par SMBG/CGM :  not checking \par Hypoglycemia: no \par Polyuria/polydipsia : no \par Weight change/BMI:  lost 10 lbs since on ozempic \par Diet: doesn't watch \par Exercise: no \par HBa1c trend:   8.5%( 11/2022) ...7.2%( 12/2022) \par \par \par prevention  \par Statin: Lipitor 80 mg daily , fish oil 2 g BID \par ACE/ARB :  ramipril 10 mg BID \par Eye examination:  9/2021\par Neuropathy: has back problems too , no pins and needles \par  \par  \par \par # post ablative  hypothyroidism\par - at the age of 16 had Grave's disease and hyperthyroidism treated with meds\par - at the age of 23 was able to get NAJERA and since that time hypothyroid\par - now on Lt4 175 mcg daily , good pill technique  , clinically euthyroid

## 2023-02-27 NOTE — REVIEW OF SYSTEMS
[Fatigue] : no fatigue [Recent Weight Gain (___ Lbs)] : recent weight gain: [unfilled] lbs [Recent Weight Loss (___ Lbs)] : no recent weight loss [Visual Field Defect] : no visual field defect [Blurred Vision] : no blurred vision [Dysphagia] : no dysphagia [Neck Pain] : no neck pain [Dysphonia] : no dysphonia [Chest Pain] : no chest pain [Palpitations] : no palpitations [Lower Ext Edema] : no lower extremity edema [Shortness Of Breath] : no shortness of breath [SOB on Exertion] : no shortness of breath on exertion [As Noted in HPI] : as noted in HPI

## 2023-02-27 NOTE — PHYSICAL EXAM
[Alert] : alert [Healthy Appearance] : healthy appearance [Obese] : obese [No Acute Distress] : no acute distress [Thyroid Not Enlarged] : the thyroid was not enlarged [No Respiratory Distress] : no respiratory distress [No Accessory Muscle Use] : no accessory muscle use [Clear to Auscultation] : lungs were clear to auscultation bilaterally [Normal S1, S2] : normal S1 and S2 [No Murmurs] : no murmurs [Regular Rhythm] : with a regular rhythm [No Edema] : no peripheral edema [Soft] : abdomen soft [No CVA Tenderness] : no ~M costovertebral angle tenderness [No Stigmata of Cushings Syndrome] : no stigmata of Cushings Syndrome [Right foot was examined, including] : right foot ~C was examined, including visual inspection with sensory and pulse exams [Left foot was examined, including] : left foot ~C was examined, including visual inspection with sensory and pulse exams [2+] : 2+ in the dorsalis pedis [No Tremors] : no tremors [Oriented x3] : oriented to person, place, and time [Abdominal Striae] : no abdominal striae [Acanthosis Nigricans] : no acanthosis nigricans [Swelling] : not swollen [Tenderness] : not tender [Erythema] : not erythematous [de-identified] : left eye proptosis right ok  [de-identified] : hernia?

## 2023-04-14 ENCOUNTER — APPOINTMENT (OUTPATIENT)
Dept: NEUROLOGY | Facility: CLINIC | Age: 63
End: 2023-04-14

## 2023-05-10 ENCOUNTER — APPOINTMENT (OUTPATIENT)
Dept: OPHTHALMOLOGY | Facility: CLINIC | Age: 63
End: 2023-05-10
Payer: MEDICARE

## 2023-05-10 ENCOUNTER — OUTPATIENT (OUTPATIENT)
Dept: OUTPATIENT SERVICES | Facility: HOSPITAL | Age: 63
LOS: 1 days | End: 2023-05-10
Payer: MEDICARE

## 2023-05-10 DIAGNOSIS — Z98.890 OTHER SPECIFIED POSTPROCEDURAL STATES: Chronic | ICD-10-CM

## 2023-05-10 DIAGNOSIS — Z90.49 ACQUIRED ABSENCE OF OTHER SPECIFIED PARTS OF DIGESTIVE TRACT: Chronic | ICD-10-CM

## 2023-05-10 DIAGNOSIS — H53.8 OTHER VISUAL DISTURBANCES: ICD-10-CM

## 2023-05-10 PROCEDURE — 92134 CPTRZ OPH DX IMG PST SGM RTA: CPT | Mod: 26

## 2023-05-10 PROCEDURE — 92014 COMPRE OPH EXAM EST PT 1/>: CPT

## 2023-05-10 PROCEDURE — 92134 CPTRZ OPH DX IMG PST SGM RTA: CPT

## 2023-05-24 DIAGNOSIS — H02.88B MEIBOMIAN GLAND DYSFUNCTION LEFT EYE, UPPER AND LOWER EYELIDS: ICD-10-CM

## 2023-05-24 DIAGNOSIS — H02.88A MEIBOMIAN GLAND DYSFUNCTION RIGHT EYE, UPPER AND LOWER EYELIDS: ICD-10-CM

## 2023-05-24 DIAGNOSIS — E11.9 TYPE 2 DIABETES MELLITUS WITHOUT COMPLICATIONS: ICD-10-CM

## 2023-05-24 DIAGNOSIS — H35.033 HYPERTENSIVE RETINOPATHY, BILATERAL: ICD-10-CM

## 2023-06-05 ENCOUNTER — APPOINTMENT (OUTPATIENT)
Dept: ENDOCRINOLOGY | Facility: CLINIC | Age: 63
End: 2023-06-05
Payer: MEDICARE

## 2023-06-05 VITALS
BODY MASS INDEX: 39.53 KG/M2 | TEMPERATURE: 97.6 F | HEIGHT: 74 IN | OXYGEN SATURATION: 98 % | DIASTOLIC BLOOD PRESSURE: 80 MMHG | HEART RATE: 80 BPM | WEIGHT: 308 LBS | SYSTOLIC BLOOD PRESSURE: 124 MMHG

## 2023-06-05 PROCEDURE — 99213 OFFICE O/P EST LOW 20 MIN: CPT

## 2023-06-05 NOTE — REVIEW OF SYSTEMS
[Fatigue] : no fatigue [Recent Weight Gain (___ Lbs)] : no recent weight gain [Recent Weight Loss (___ Lbs)] : no recent weight loss [Visual Field Defect] : no visual field defect [Blurred Vision] : no blurred vision [Dysphagia] : no dysphagia [Neck Pain] : no neck pain [Dysphonia] : no dysphonia [Chest Pain] : no chest pain [Palpitations] : no palpitations [Lower Ext Edema] : no lower extremity edema [Shortness Of Breath] : no shortness of breath [SOB on Exertion] : no shortness of breath on exertion [As Noted in HPI] : as noted in HPI [Nausea] : no nausea [Constipation] : no constipation [Vomiting] : no vomiting [Diarrhea] : no diarrhea

## 2023-06-05 NOTE — HISTORY OF PRESENT ILLNESS
[FreeTextEntry1] : Mr. JORI BOYKIN  Is  a 63 year  old male  with CAD s/p stent, stroke and  basilar artery stenosis , post ablative hypothyroidism for Grave's disease  who presented for follow up  evaluation of type 2 Diabetes:\par \par \par Diagnosis: around 6 years ago \par Current Regimen: Mounjaro 7.5 mg Q week ,  metformin 1000 mg BID, glimepiride 4 mg OD \par Previous regimens:Ozempic \par Compliance: ok \par SMBG/CGM :  not checking \par Hypoglycemia: no \par Polyuria/polydipsia : no \par Weight change/BMI:  not loosing but stable \par Diet: doesn't watch diet , carb heavy \par Exercise: no \par HBa1c trend:   8.5%( 11/2022) ...7.2%( 12/2022).. \par \par \par prevention  \par Statin: Lipitor 80 mg daily , fish oil 2 g BID \par ACE/ARB :  ramipril 10 mg BID \par Eye examination: 5/2023 \par Neuropathy: has back problems too , no pins and needles \par  \par  \par \par # post ablative  hypothyroidism\par - at the age of 16 had Grave's disease and hyperthyroidism treated with meds\par - at the age of 23 was able to get NAJERA and since that time hypothyroid\par - now on Lt4 175 mcg daily , good pill technique  , clinically euthyroid

## 2023-06-05 NOTE — ASSESSMENT
[Diabetes Foot Care] : diabetes foot care [Long Term Vascular Complications] : long term vascular complications of diabetes [Carbohydrate Consistent Diet] : carbohydrate consistent diet [Exercise/Effect on Glucose] : exercise/effect on glucose [Hypoglycemia Management] : hypoglycemia management [Self Monitoring of Blood Glucose] : self monitoring of blood glucose [Retinopathy Screening] : Patient was referred to ophthalmology for retinopathy screening [Weight Loss] : weight loss [FreeTextEntry1] : Mr. JORI BOYKIN  Is  a 63 year  old male  with CAD s/p stent, stroke and  basilar artery stenosis ,AAA ( monitored )  post ablative hypothyroidism for Grave's disease  who presented for evaluation of type 2 Diabetes:\par \par \par # type 2 DM / DL/ Obesity \par - did not do labs now , he is on  on  metformin 1000 mg BID, glimepiride 4 mg OD Mounajro 7.5 mg Q week started last visit \par - increase Mounajro to 10 mg Q week and stop glimepiride \par - continue metformin will adjust dose based on GFR \par - continue Statin and fish oil \par - saw opthalmology 5/2023 and due for podiatry \par - will benefit from CGM for Bg monitoring given hypoglycemia ?  , interested in DEXCOM but was denied , he will try yenni 3 again \par \par # post ablative hypothyroidism \par - tft's ok on lt4 175 mcg daily \par - continue same no hypo/hyperthyroid symptoms check labs \par will call with results \par

## 2023-06-05 NOTE — PHYSICAL EXAM
[Alert] : alert [Healthy Appearance] : healthy appearance [Obese] : obese [No Acute Distress] : no acute distress [Thyroid Not Enlarged] : the thyroid was not enlarged [No Respiratory Distress] : no respiratory distress [No Accessory Muscle Use] : no accessory muscle use [Clear to Auscultation] : lungs were clear to auscultation bilaterally [Normal S1, S2] : normal S1 and S2 [No Murmurs] : no murmurs [Regular Rhythm] : with a regular rhythm [No Edema] : no peripheral edema [Soft] : abdomen soft [No CVA Tenderness] : no ~M costovertebral angle tenderness [No Stigmata of Cushings Syndrome] : no stigmata of Cushings Syndrome [Right foot was examined, including] : right foot ~C was examined, including visual inspection with sensory and pulse exams [Left foot was examined, including] : left foot ~C was examined, including visual inspection with sensory and pulse exams [2+] : 2+ in the dorsalis pedis [No Tremors] : no tremors [Oriented x3] : oriented to person, place, and time [Abdominal Striae] : no abdominal striae [Acanthosis Nigricans] : no acanthosis nigricans [Swelling] : not swollen [Tenderness] : not tender [Erythema] : not erythematous [de-identified] : mild left eye proptosis right ok  [de-identified] : has AAA

## 2023-06-05 NOTE — DATA REVIEWED
[FreeTextEntry1] : 7/2022: A1c 8.5 %TSH 0.46 glucose 189  crea 1.57  GFR 50  Tg 232  LDL 70 \par 12/2022:  A1c 7.2%  GFR 58  glucose 161 crea 1.4  LDL 57 TSH 0.43\par \par did not go for labs , will go now

## 2023-06-09 ENCOUNTER — APPOINTMENT (OUTPATIENT)
Dept: VASCULAR SURGERY | Facility: CLINIC | Age: 63
End: 2023-06-09
Payer: MEDICARE

## 2023-06-09 VITALS
HEIGHT: 74 IN | DIASTOLIC BLOOD PRESSURE: 78 MMHG | WEIGHT: 308 LBS | BODY MASS INDEX: 39.53 KG/M2 | SYSTOLIC BLOOD PRESSURE: 123 MMHG

## 2023-06-09 PROCEDURE — 99214 OFFICE O/P EST MOD 30 MIN: CPT

## 2023-06-09 PROCEDURE — 93978 VASCULAR STUDY: CPT

## 2023-06-12 LAB
ALBUMIN SERPL ELPH-MCNC: 4.5 G/DL
ALP BLD-CCNC: 132 U/L
ALT SERPL-CCNC: 33 U/L
ANION GAP SERPL CALC-SCNC: 16 MMOL/L
AST SERPL-CCNC: 26 U/L
BILIRUB SERPL-MCNC: 0.4 MG/DL
BUN SERPL-MCNC: 20 MG/DL
BUN SERPL-MCNC: 20 MG/DL
CALCIUM SERPL-MCNC: 9.5 MG/DL
CHLORIDE SERPL-SCNC: 98 MMOL/L
CHOLEST SERPL-MCNC: 123 MG/DL
CO2 SERPL-SCNC: 20 MMOL/L
CREAT SERPL-MCNC: 1.5 MG/DL
CREAT SERPL-MCNC: 1.5 MG/DL
CREAT SPEC-SCNC: 204 MG/DL
EGFR: 53 ML/MIN/1.73M2
EGFR: 53 ML/MIN/1.73M2
ESTIMATED AVERAGE GLUCOSE: 203 MG/DL
GLUCOSE SERPL-MCNC: 287 MG/DL
HBA1C MFR BLD HPLC: 8.7 %
HDLC SERPL-MCNC: 31 MG/DL
LDLC SERPL CALC-MCNC: 50 MG/DL
MICROALBUMIN 24H UR DL<=1MG/L-MCNC: 14.1 MG/DL
MICROALBUMIN/CREAT 24H UR-RTO: 69 MG/G
NONHDLC SERPL-MCNC: 92 MG/DL
POTASSIUM SERPL-SCNC: 4.7 MMOL/L
PROT SERPL-MCNC: 7.4 G/DL
SODIUM SERPL-SCNC: 134 MMOL/L
T4 FREE SERPL-MCNC: 2.9 NG/DL
TRIGL SERPL-MCNC: 210 MG/DL
TSH SERPL-ACNC: 1.02 UIU/ML

## 2023-06-15 ENCOUNTER — APPOINTMENT (OUTPATIENT)
Dept: CARDIOLOGY | Facility: CLINIC | Age: 63
End: 2023-06-15
Payer: MEDICARE

## 2023-06-15 VITALS
BODY MASS INDEX: 39.4 KG/M2 | HEIGHT: 74 IN | WEIGHT: 307 LBS | HEART RATE: 93 BPM | DIASTOLIC BLOOD PRESSURE: 82 MMHG | SYSTOLIC BLOOD PRESSURE: 126 MMHG

## 2023-06-15 PROCEDURE — 93000 ELECTROCARDIOGRAM COMPLETE: CPT

## 2023-06-15 PROCEDURE — 99214 OFFICE O/P EST MOD 30 MIN: CPT

## 2023-06-15 RX ORDER — BLOOD-GLUCOSE,RECEIVER,CONT
EACH MISCELLANEOUS
Qty: 1 | Refills: 0 | Status: DISCONTINUED | COMMUNITY
Start: 2022-11-17 | End: 2023-06-15

## 2023-06-15 RX ORDER — TIRZEPATIDE 5 MG/.5ML
5 INJECTION, SOLUTION SUBCUTANEOUS
Qty: 1 | Refills: 0 | Status: DISCONTINUED | COMMUNITY
Start: 2023-02-27 | End: 2023-06-15

## 2023-06-15 RX ORDER — SEMAGLUTIDE 2.68 MG/ML
8 INJECTION, SOLUTION SUBCUTANEOUS
Qty: 3 | Refills: 1 | Status: DISCONTINUED | COMMUNITY
Start: 2022-11-17 | End: 2023-06-15

## 2023-06-15 RX ORDER — BLOOD-GLUCOSE SENSOR
EACH MISCELLANEOUS
Qty: 1 | Refills: 2 | Status: DISCONTINUED | COMMUNITY
Start: 2022-11-17 | End: 2023-06-15

## 2023-06-15 NOTE — HISTORY OF PRESENT ILLNESS
[FreeTextEntry1] : 63M with CAD s/p RCA PCI, AAA, HTN, T2DM, CVA, DLD, basilar artery stenosis here for followup of CAD, HTN, DLD. \par \par 6/15/23 Pt is seen for f/u.  Denies SOB, no chest pain, no dizziness, no BLEs edema.  Pt tolerates Plavix well, no s.s of bleeding.  Pt c.o knee pain with walking.  Ozempic was changed to Mounjaro inj.  Pt did not start exercise program.\par \par Can walk >4 blocks and climb> 2 flights of stairs with no CP or SOB. \par \par CTA next week for AAA\par \par 22 Pt is seen for f.u.  Denies SOB, no chest pain, no dizziness, no edema.  Brilinta/ ASA was D/brisa by Dr Esqueda pt was started on plavix tolerating it well.  Pt is mostly sedentary \par 22- PVR R  anterior Tibial artery disease  L distal sup Femoral artery and /or popiteal and L ant tibial arterial disease.   Blood work done today in am \par 22  Abdominal Aorta AAA 4.5 cm \par \par 22\par No further episodes of chest pain. No shortness of breath, palpitations, lightheadedness/dizziness, pre-syncope/syncope, or lower extremity edema. \par \par Rhinorrhea 3-4 times per day. Sneezing. No congestion. \par \par Continues to struggle with eating. Starting to make lifestyle changes. 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 Lost 17 lbs. Reading labels. Avoiding added sugar. Weight loss has slowed because of recent sciatica flare. \par \par Had recent chest pain episode after touring [facility] with his daughter. They had walked most of the day and at the end of the day they were going to the bookstore when we had chest pain and diaphoresis. No associated SOB, dizziness. Scared him. Improved after 7-8 minutes of rest. No recurrence. \par \par 22\par \par No chest pain, SOB, palpitations, LE edema. \par \par AAA incidentally found 20 years ago. Previously following with Dr. Raygoza. \par \par Joined [facility] last month. Trying to lose weight. Resistance training (>150 lbs) yesterday. \par \par Quit smoking \par \par No known family hxo aortic aneurysm, however grandfather  suddenly at 63 yo. Diagnosis unknown\par \par Single dad to 18 yo and 15 yo\par \par Increased stress raising kids by himself. States he is high functioning, but depressed.

## 2023-06-15 NOTE — CARDIOLOGY SUMMARY
[de-identified] : EKG 8/29/22 Normal sinus rhythm 94 bpm\par EKG 5/9/22 Normal sinus rhythm 87 bpm\par EKG 6/15/23 Normal sinus rhythm 93 bpm  [de-identified] : Pharmacologic nuclear stress test 7/5/22 negative, no perfusion defects [de-identified] : 09/28/22- PVR R  anterior Tibial artery disease  L distal sup Femoral artery and /or popiteal and L ant tibial arterial disease.   Blood \par LE ART US 9/28/22 mild to moder diffuse athero b/l, no pop art aneurysm\par 11/23/22 US Abdominal Aorta AAA 4.5 cm

## 2023-06-15 NOTE — ASSESSMENT
[FreeTextEntry1] : Assessment:\par #CAD s/p RCA PCI 12/2018\par #AAA 4.5 cm 11/23/22\par - Unchanged from 3/2021 to 3/2022\par #Basilar artery stenosis and PCA stenosis\par - Medical management recommended\par #Hxo carotid stenosis\par #HTN\par - Goal SBP<110 with AAA, however his goal is SBP 120s given BA and PCA stenosis \par #DLD\par - 7/22/22 , , HDL 37, LDL 70, AST 17, ALT 21 on atorva 80\par - TG not at goal\par #BMI 37\par #T2DM, uncontrolled \par - 7/22/22 Hgb A1c 8.5%\par #PAD \par \par 6/9/23\par , , HDL 31, LDL 50 \par K 4.7, Cr 1.5\par A1c 8.7%\par \par Plan:\par - Echo \par - Cont Plavix\par - Cont  Vascepa for hypertriglyceridemia\par - Cont atorvastatin 80 mg qHS\par - Continue ramipril 10 mg BID and diltiazem 360 mg daily \par - No weight lifting\par - Counseled patient on diet and exercise\par - RTC 6 months\par

## 2023-06-20 NOTE — ED ADULT NURSE NOTE - CAS TRG GENERAL AIRWAY, MLM
Called and spoke to mother and advised that labs were within normal limits with minor variation except for vitamin D which was low at 25 5  Advised mother to make sure that patient takes her multivitamin and an additional 1000 units of vitamin D daily  Patient's creatinine was slightly elevated at 0 89 which is considered within normal limits for an adolescent but with family history of dad having kidney disease I advised mother that I will repeat her CMP in 1 month, around the end of July 2023  Advised mother that order will be in the computer and to follow-up in 1 month and have completed  Advised mother that EKG was visible in the computer but had not been read by pediatric cardiologist yet  Advised mother that Muse read EKG as normal and will only call back if pediatric cardiologist reads it as anything different  Mother reports that patient has not had any further near syncopal episodes  Mom reports patient is tired but she thinks it is because she has been up 3 nights without sleeping prior to last night  Mother reports that they have limited her cell phone usage and patient is unable to use her cell phone after 8:30 PM   Mom is also making sure that patient is eating healthy and drinking adequate amounts of fluids  Mother verbalizes understanding of information given and will have repeat CMP done in a month  Mother appreciative of phone call 
Patent

## 2023-06-29 ENCOUNTER — OUTPATIENT (OUTPATIENT)
Dept: OUTPATIENT SERVICES | Facility: HOSPITAL | Age: 63
LOS: 1 days | End: 2023-06-29
Payer: MEDICARE

## 2023-06-29 ENCOUNTER — RESULT REVIEW (OUTPATIENT)
Age: 63
End: 2023-06-29

## 2023-06-29 DIAGNOSIS — Z98.890 OTHER SPECIFIED POSTPROCEDURAL STATES: Chronic | ICD-10-CM

## 2023-06-29 DIAGNOSIS — I71.40 ABDOMINAL AORTIC ANEURYSM, WITHOUT RUPTURE, UNSPECIFIED: ICD-10-CM

## 2023-06-29 DIAGNOSIS — Z90.49 ACQUIRED ABSENCE OF OTHER SPECIFIED PARTS OF DIGESTIVE TRACT: Chronic | ICD-10-CM

## 2023-06-29 PROCEDURE — 74174 CTA ABD&PLVS W/CONTRAST: CPT | Mod: 26

## 2023-06-29 PROCEDURE — 74174 CTA ABD&PLVS W/CONTRAST: CPT

## 2023-06-30 DIAGNOSIS — I71.40 ABDOMINAL AORTIC ANEURYSM, WITHOUT RUPTURE, UNSPECIFIED: ICD-10-CM

## 2023-07-12 ENCOUNTER — NON-APPOINTMENT (OUTPATIENT)
Age: 63
End: 2023-07-12

## 2023-07-12 ENCOUNTER — APPOINTMENT (OUTPATIENT)
Dept: VASCULAR SURGERY | Facility: CLINIC | Age: 63
End: 2023-07-12
Payer: MEDICARE

## 2023-07-12 ENCOUNTER — APPOINTMENT (OUTPATIENT)
Dept: CARDIOLOGY | Facility: CLINIC | Age: 63
End: 2023-07-12
Payer: MEDICARE

## 2023-07-12 PROCEDURE — 93306 TTE W/DOPPLER COMPLETE: CPT

## 2023-07-12 PROCEDURE — 99215 OFFICE O/P EST HI 40 MIN: CPT

## 2023-07-20 ENCOUNTER — APPOINTMENT (OUTPATIENT)
Dept: CARDIOLOGY | Facility: CLINIC | Age: 63
End: 2023-07-20
Payer: MEDICARE

## 2023-07-20 PROCEDURE — 99441: CPT

## 2023-07-27 ENCOUNTER — RESULT REVIEW (OUTPATIENT)
Age: 63
End: 2023-07-27

## 2023-07-27 ENCOUNTER — OUTPATIENT (OUTPATIENT)
Dept: OUTPATIENT SERVICES | Facility: HOSPITAL | Age: 63
LOS: 1 days | End: 2023-07-27
Payer: MEDICARE

## 2023-07-27 VITALS
HEIGHT: 74 IN | TEMPERATURE: 98 F | HEART RATE: 76 BPM | WEIGHT: 300.93 LBS | RESPIRATION RATE: 18 BRPM | DIASTOLIC BLOOD PRESSURE: 84 MMHG | SYSTOLIC BLOOD PRESSURE: 122 MMHG | OXYGEN SATURATION: 98 %

## 2023-07-27 DIAGNOSIS — Z01.818 ENCOUNTER FOR OTHER PREPROCEDURAL EXAMINATION: ICD-10-CM

## 2023-07-27 DIAGNOSIS — Z90.49 ACQUIRED ABSENCE OF OTHER SPECIFIED PARTS OF DIGESTIVE TRACT: Chronic | ICD-10-CM

## 2023-07-27 DIAGNOSIS — Z98.890 OTHER SPECIFIED POSTPROCEDURAL STATES: Chronic | ICD-10-CM

## 2023-07-27 DIAGNOSIS — I71.40 ABDOMINAL AORTIC ANEURYSM, WITHOUT RUPTURE, UNSPECIFIED: ICD-10-CM

## 2023-07-27 DIAGNOSIS — Z95.5 PRESENCE OF CORONARY ANGIOPLASTY IMPLANT AND GRAFT: Chronic | ICD-10-CM

## 2023-07-27 LAB
A1C WITH ESTIMATED AVERAGE GLUCOSE RESULT: 8.4 % — HIGH (ref 4–5.6)
ALBUMIN SERPL ELPH-MCNC: 4.5 G/DL — SIGNIFICANT CHANGE UP (ref 3.5–5.2)
ALP SERPL-CCNC: 113 U/L — SIGNIFICANT CHANGE UP (ref 30–115)
ALT FLD-CCNC: 43 U/L — HIGH (ref 0–41)
ANION GAP SERPL CALC-SCNC: 13 MMOL/L — SIGNIFICANT CHANGE UP (ref 7–14)
APTT BLD: 30.7 SEC — SIGNIFICANT CHANGE UP (ref 27–39.2)
AST SERPL-CCNC: 31 U/L — SIGNIFICANT CHANGE UP (ref 0–41)
BASOPHILS # BLD AUTO: 0.08 K/UL — SIGNIFICANT CHANGE UP (ref 0–0.2)
BASOPHILS NFR BLD AUTO: 1 % — SIGNIFICANT CHANGE UP (ref 0–1)
BILIRUB SERPL-MCNC: 0.4 MG/DL — SIGNIFICANT CHANGE UP (ref 0.2–1.2)
BLD GP AB SCN SERPL QL: SIGNIFICANT CHANGE UP
BUN SERPL-MCNC: 18 MG/DL — SIGNIFICANT CHANGE UP (ref 10–20)
CALCIUM SERPL-MCNC: 9.8 MG/DL — SIGNIFICANT CHANGE UP (ref 8.4–10.5)
CHLORIDE SERPL-SCNC: 104 MMOL/L — SIGNIFICANT CHANGE UP (ref 98–110)
CO2 SERPL-SCNC: 21 MMOL/L — SIGNIFICANT CHANGE UP (ref 17–32)
CREAT SERPL-MCNC: 1.4 MG/DL — SIGNIFICANT CHANGE UP (ref 0.7–1.5)
EGFR: 57 ML/MIN/1.73M2 — LOW
EOSINOPHIL # BLD AUTO: 0.24 K/UL — SIGNIFICANT CHANGE UP (ref 0–0.7)
EOSINOPHIL NFR BLD AUTO: 3.1 % — SIGNIFICANT CHANGE UP (ref 0–8)
ESTIMATED AVERAGE GLUCOSE: 194 MG/DL — HIGH (ref 68–114)
GLUCOSE SERPL-MCNC: 147 MG/DL — HIGH (ref 70–99)
HCT VFR BLD CALC: 45.1 % — SIGNIFICANT CHANGE UP (ref 42–52)
HGB BLD-MCNC: 15.1 G/DL — SIGNIFICANT CHANGE UP (ref 14–18)
IMM GRANULOCYTES NFR BLD AUTO: 0.5 % — HIGH (ref 0.1–0.3)
INR BLD: 0.94 RATIO — SIGNIFICANT CHANGE UP (ref 0.65–1.3)
LYMPHOCYTES # BLD AUTO: 1.8 K/UL — SIGNIFICANT CHANGE UP (ref 1.2–3.4)
LYMPHOCYTES # BLD AUTO: 23.6 % — SIGNIFICANT CHANGE UP (ref 20.5–51.1)
MCHC RBC-ENTMCNC: 29.2 PG — SIGNIFICANT CHANGE UP (ref 27–31)
MCHC RBC-ENTMCNC: 33.5 G/DL — SIGNIFICANT CHANGE UP (ref 32–37)
MCV RBC AUTO: 87.2 FL — SIGNIFICANT CHANGE UP (ref 80–94)
MONOCYTES # BLD AUTO: 0.96 K/UL — HIGH (ref 0.1–0.6)
MONOCYTES NFR BLD AUTO: 12.6 % — HIGH (ref 1.7–9.3)
NEUTROPHILS # BLD AUTO: 4.51 K/UL — SIGNIFICANT CHANGE UP (ref 1.4–6.5)
NEUTROPHILS NFR BLD AUTO: 59.2 % — SIGNIFICANT CHANGE UP (ref 42.2–75.2)
NRBC # BLD: 0 /100 WBCS — SIGNIFICANT CHANGE UP (ref 0–0)
PLATELET # BLD AUTO: 343 K/UL — SIGNIFICANT CHANGE UP (ref 130–400)
PMV BLD: 9.9 FL — SIGNIFICANT CHANGE UP (ref 7.4–10.4)
POTASSIUM SERPL-MCNC: 4.7 MMOL/L — SIGNIFICANT CHANGE UP (ref 3.5–5)
POTASSIUM SERPL-SCNC: 4.7 MMOL/L — SIGNIFICANT CHANGE UP (ref 3.5–5)
PROT SERPL-MCNC: 7.2 G/DL — SIGNIFICANT CHANGE UP (ref 6–8)
PROTHROM AB SERPL-ACNC: 10.7 SEC — SIGNIFICANT CHANGE UP (ref 9.95–12.87)
RBC # BLD: 5.17 M/UL — SIGNIFICANT CHANGE UP (ref 4.7–6.1)
RBC # FLD: 13.9 % — SIGNIFICANT CHANGE UP (ref 11.5–14.5)
SODIUM SERPL-SCNC: 138 MMOL/L — SIGNIFICANT CHANGE UP (ref 135–146)
WBC # BLD: 7.63 K/UL — SIGNIFICANT CHANGE UP (ref 4.8–10.8)
WBC # FLD AUTO: 7.63 K/UL — SIGNIFICANT CHANGE UP (ref 4.8–10.8)

## 2023-07-27 PROCEDURE — 93010 ELECTROCARDIOGRAM REPORT: CPT

## 2023-07-27 PROCEDURE — 36415 COLL VENOUS BLD VENIPUNCTURE: CPT

## 2023-07-27 PROCEDURE — 80053 COMPREHEN METABOLIC PANEL: CPT

## 2023-07-27 PROCEDURE — 85610 PROTHROMBIN TIME: CPT

## 2023-07-27 PROCEDURE — 86900 BLOOD TYPING SEROLOGIC ABO: CPT

## 2023-07-27 PROCEDURE — 86901 BLOOD TYPING SEROLOGIC RH(D): CPT

## 2023-07-27 PROCEDURE — 93005 ELECTROCARDIOGRAM TRACING: CPT

## 2023-07-27 PROCEDURE — 71046 X-RAY EXAM CHEST 2 VIEWS: CPT | Mod: 26

## 2023-07-27 PROCEDURE — 86850 RBC ANTIBODY SCREEN: CPT

## 2023-07-27 PROCEDURE — 85730 THROMBOPLASTIN TIME PARTIAL: CPT

## 2023-07-27 PROCEDURE — 83036 HEMOGLOBIN GLYCOSYLATED A1C: CPT

## 2023-07-27 PROCEDURE — 99214 OFFICE O/P EST MOD 30 MIN: CPT | Mod: 25

## 2023-07-27 PROCEDURE — 71046 X-RAY EXAM CHEST 2 VIEWS: CPT

## 2023-07-27 PROCEDURE — 85025 COMPLETE CBC W/AUTO DIFF WBC: CPT

## 2023-07-27 RX ORDER — RAMIPRIL 5 MG
1 CAPSULE ORAL
Qty: 0 | Refills: 0 | DISCHARGE

## 2023-07-27 RX ORDER — METFORMIN HYDROCHLORIDE 850 MG/1
1 TABLET ORAL
Qty: 0 | Refills: 0 | DISCHARGE

## 2023-07-27 RX ORDER — LEVOTHYROXINE SODIUM 125 MCG
1 TABLET ORAL
Qty: 0 | Refills: 0 | DISCHARGE

## 2023-07-27 NOTE — H&P PST ADULT - NSICDXPASTSURGICALHX_GEN_ALL_CORE_FT
PAST SURGICAL HISTORY:  History of appendectomy     History of hand surgery      PAST SURGICAL HISTORY:  H/O heart artery stent     History of appendectomy     History of hand surgery     S/P cardiac cath

## 2023-07-27 NOTE — H&P PST ADULT - NSICDXPASTMEDICALHX_GEN_ALL_CORE_FT
PAST MEDICAL HISTORY:  Aortic aneurysm     Diabetes     HTN (hypertension)     Rheumatoid arthritis     Skull fracture     Thyroid disease      PAST MEDICAL HISTORY:  Aortic aneurysm     CAD (coronary artery disease)     CVA (cerebral vascular accident)     Diabetes     HTN (hypertension)     Skull fracture     Thyroid disease

## 2023-07-27 NOTE — H&P PST ADULT - NSANTHOSAYNRD_GEN_A_CORE
No. BARRERA screening performed.  STOP BANG Legend: 0-2 = LOW Risk; 3-4 = INTERMEDIATE Risk; 5-8 = HIGH Risk

## 2023-07-27 NOTE — H&P PST ADULT - HISTORY OF PRESENT ILLNESS
64 Y/O MALE PT TO PAST WITH HX              PT NOW FOR SCHEDULED PROCEDURE. PT DENIES ANY CP SOB PALP COUGH DYSURIA FEVER URI. PT ABLE TO CAROLYN 1-2 FOS W/O SOB  Anesthesia Alert  NO--Difficult Airway  NO--History of neck surgery or radiation  NO--Limited ROM of neck  NO--History of Malignant hyperthermia  NO--Personal or family history of Pseudocholinesterase deficiency.  NO--Prior Anesthesia Complication  NO--Latex Allergy  NO--Loose teeth  NO--History of Rheumatoid Arthritis  NO--BARRERA  NO--Bleeding risk  NO--Other_____   64 Y/O MALE PT TO PAST WITH HX AAA. PT C/O INCREASE IN SIZE, IRREGULARITY. PT DENIES ANY PAIN, SOB   PT NOW FOR SCHEDULED PROCEDURE ( ENDOVASC AAA REPAIR) 5. PT DENIES ANY CP SOB PALP COUGH DYSURIA FEVER URI. PT ABLE TO CAROLYN 1-2 FOS W/O SOB  Anesthesia Alert  NO--Difficult Airway  NO--History of neck surgery or radiation  NO--Limited ROM of neck  NO--History of Malignant hyperthermia  NO--Personal or family history of Pseudocholinesterase deficiency.  NO--Prior Anesthesia Complication  NO--Latex Allergy  NO--Loose teeth  NO--History of Rheumatoid Arthritis  NO--BARRERA  NO--Bleeding risk  NO--Other_____

## 2023-07-27 NOTE — H&P PST ADULT - NSICDXFAMILYHX_GEN_ALL_CORE_FT
FAMILY HISTORY:  Father  Still living? No  Family history of CABG, Age at diagnosis: Age Unknown  Family history of stroke, Age at diagnosis: Age Unknown

## 2023-07-28 DIAGNOSIS — I71.40 ABDOMINAL AORTIC ANEURYSM, WITHOUT RUPTURE, UNSPECIFIED: ICD-10-CM

## 2023-07-28 DIAGNOSIS — Z01.818 ENCOUNTER FOR OTHER PREPROCEDURAL EXAMINATION: ICD-10-CM

## 2023-08-02 PROBLEM — I63.9 CEREBRAL INFARCTION, UNSPECIFIED: Chronic | Status: ACTIVE | Noted: 2023-07-27

## 2023-08-03 ENCOUNTER — APPOINTMENT (OUTPATIENT)
Dept: NEUROLOGY | Facility: CLINIC | Age: 63
End: 2023-08-03
Payer: MEDICARE

## 2023-08-03 VITALS
TEMPERATURE: 98 F | SYSTOLIC BLOOD PRESSURE: 131 MMHG | BODY MASS INDEX: 38.5 KG/M2 | HEIGHT: 74 IN | DIASTOLIC BLOOD PRESSURE: 91 MMHG | WEIGHT: 300 LBS | HEART RATE: 93 BPM | OXYGEN SATURATION: 98 %

## 2023-08-03 DIAGNOSIS — G45.9 TRANSIENT CEREBRAL ISCHEMIC ATTACK, UNSPECIFIED: ICD-10-CM

## 2023-08-03 PROCEDURE — 99214 OFFICE O/P EST MOD 30 MIN: CPT

## 2023-08-03 NOTE — HISTORY OF PRESENT ILLNESS
[FreeTextEntry1] : Mr. BOYKIN 63 year old male presents today as a follow up. He was last seen Sept 2022, to follow up on stroke and posterior circulation stenosis. At his last visit he was switched from ASA and Brilinta to Plavix 75 mg.   Currently he reports he is overall doing well. When had the stroke he experienced aphasia and right sided weakness, hasn't had any symptoms since. No new tingling, visual disturbance, or weakness, no facial weakness, or tongue numbness.  He reports he is going for AAA repair 8/10/23. His only complaint today is that about 1 year ago, he was at [employer] and hurt his back. He has a known history of lumbar radiculopathy, and since then he experiences numbness in his left ankle, he no longer experiences back pain.   His most recent imaging was May 2022 which showed stable basilar artery stenosis and MR NOVA showing good flow through basilar artery.  Labs:  7/27/23: HgA1c 8.4 managed with PCP and Endocrine 6/9/23: Lipid Panel: Serum Cholesterol: 123, Triglycerides 210, HDL 31, LDL 50   Smoking: No, quit 2018 Med compliance: Yes BP today 134/91

## 2023-08-03 NOTE — REVIEW OF SYSTEMS
[Negative] : Psychiatric [As Noted in HPI] : as noted in HPI [Sleep Disturbances] : no sleep disturbances [Facial Weakness] : no facial weakness [Arm Weakness] : no arm weakness [Hand Weakness] : no hand weakness [Leg Weakness] : no leg weakness [Poor Coordination] : good coordination [Difficulty Writing] : no difficulty writing [Difficulties in Speech] : no speech difficulties [Tingling] : no tingling [Difficulty Walking] : no difficulty walking

## 2023-08-03 NOTE — PHYSICAL EXAM
[FreeTextEntry1] : Mental status: Awake, alert and oriented x3.  Recent and remote memory intact.  Naming, repetition and comprehension intact.  Attention/concentration intact.  No dysarthria, no aphasia.  Fund of knowledge appropriate.   Cranial nerves: Pupils equally round and reactive to light, visual fields full, no nystagmus, extraocular muscles intact, V1 through V3 intact bilaterally and symmetric, face symmetric, hearing intact to finger rub, palate elevation symmetric, tongue was midline.  Motor:  MRC grading 5/5 b/l UE/LE.   strength 5/5.  Normal tone and bulk.  No abnormal movements.   Sensation: Intact to light touch, vibration temperature, and pinprick BL UE.  BL LE decreased sensation to vibration below the ankle L 5 seconds, R 6 seconds, decreased to pinprick on LLE, intact to temperature, and light touch throughout.   Coordination: No dysmetria on finger-to-nose.  Reflexes: 2+ in bilateral UE/LE.  Gait: Narrow and steady.

## 2023-08-03 NOTE — ASSESSMENT
[FreeTextEntry1] : Mr. BOYKIN 63 year old male presents today as a follow up. He has a hx of stroke in the past, CAD, HLD, lumbar radiculopathy, DM, and HTN. He is currently managing his HLD with medications, most recent LDL 6/9/23: 50, his DM is managed by PCP and endocrinology: A1c 8.4, he quit smoking in 2018. He is overall doing well with no new focal deficits. He reports numbness in his left ankle since hurting his back about 1 year ago. He does not have back pain today. Advised that the numbness is likely related to his Diabetes and known Lumbar radiculopathy.    Plan: -BL Carotid Duplex  -Continue Plavix 75 mg daily -Continue HLD management, DM management, and BP management  -RTC 9 months   I have spent 30 minutes on this encounter.

## 2023-08-09 NOTE — ASU PATIENT PROFILE, ADULT - NSICDXPASTMEDICALHX_GEN_ALL_CORE_FT
PAST MEDICAL HISTORY:  Aortic aneurysm     CAD (coronary artery disease)     CVA (cerebral vascular accident)     Diabetes     HTN (hypertension)     Skull fracture     Thyroid disease

## 2023-08-09 NOTE — ED ADULT TRIAGE NOTE - ESI TRIAGE ACUITY LEVEL, MLM
Your current Orthopaedic problem we are working together to treat is pain.    Medications that were recommended for you today are:  GABAPENTIN:  We are starting you on a medication called Gabapentin (Neurontin).   This medication will work as a whole by reducing the activity of hyperactive nerves; since pain nerves become hyperactive in chronic pain (through neuroplasticity), they become sensitive to the action of this type of medication.    Possible side effects of Gabapentin:   Sedation (often fades in about a month, but this is why we are starting you on low doses and increasing dose slowly)  Vertigo (usually fades in about a week)  Cognitive dysfunction, weight gain  Ataxia (unsteadiness, clumsiness), depression, tremor, and/or diplopia (double vision)    Instructions: Take these pills with water. Do not chew or crush them. Try to take the medicine at the same time each day. Follow the directions on your prescription label.     If you have side effects: If you notice side effects at the current dose, go back down to the previous lower dose x1 week then resume titration schedule. If you notice side effects again, go back down to previous lower dose and remain there until you are next seen.    Gabapentin 300 mg capsules or tablets  Take each dose for at least 3 days before moving on to next dose as tolerated.   AM PM NIGHTLY Duration   - - 1 For at least 3 days, then move to next row   1 - 1 For at least 3 days, then move to next row   1 1 1 For at least 3 days, then move to next row   1 1 2 For at least 3 days, then move to next row   2 1 2 For at least 3 days, then move to next row   2 2 2 This is the final dose       Please tell us the dose you are taking when you are due for a refill, so that we may order the appropriate refill dose. You can send a message via the [employer] ramsey or give us a call. If you notice relief at a lower dose, please tell us the dose that you are taking.     Do not stop the medication  abruptly, we will provide you with a weaning schedule to safely discontinue this medication.           - PLEASE CONTACT OFFICE BEFORE STOPPING ANY MEDICATION     - Please allow 72 hours for all refill requests.  We will not refill any pain medicine after business hours or on weekends. Thank you!    Understanding Lumbar Radiculopathy    Lumbar radiculopathy is irritation or inflammation of a nerve root in the low back. It causes symptoms that spread out from the back down one or both legs. To understand this condition, it helps to understand the parts of the spine:  Vertebrae. These are bones that stack to form the spine. The lumbar spine contains the 5 bottom vertebrae.  Disks. These are soft pads of tissue between the vertebrae. They act as shock absorbers for the spine.  Spinal canal. This is a tunnel formed within the stacked vertebrae. In the lumbar spine, nerves run through this canal.  Nerves. These branch off and leave the spinal canal, traveling out to parts of the body. As they leave the spinal canal, nerves pass through openings between the vertebrae. The nerve root is the part of the nerve that is closest to the spinal canal.  Sciatic nerve. This is a large nerve formed from several nerve roots in the low back. This nerve extends down the back of the leg to the foot.      With lumbar radiculopathy, nerve roots in the low back become irritated. This leads to pain and symptoms. The sciatic nerve is commonly involved, so the condition is often called sciatica.    What causes lumbar radiculopathy?  Aging, injury, poor posture, extra body weight, and other issues can lead to problems in the low back. These problems may then irritate nerve roots. They include:  Damage to a disk in the lumbar spine. The damaged disk may then press on nearby nerve roots.  Degeneration from wear and tear, and aging. This can lead to narrowing (stenosis) of the openings between the vertebrae. The narrowed openings press on nerve  3 roots as they leave the spinal canal.  Unstable spine. This is when a vertebra slips forward. It can then press on a nerve root.    Other, less common things can put pressure on nerves in the low back. These include diabetes, infection, or a tumor.    Symptoms of lumbar radiculopathy  These include:  Pain in the low back  Pain, numbness, tingling, or weakness that travels into the buttocks, hip, groin, or leg  Muscle spasms    Treatment for lumbar radiculopathy  In most cases, your healthcare provider will first try treatments that help relieve symptoms. These may include:  Prescription and over-the-counter pain medicines. These help relieve pain, swelling, and irritation.  Limits on positions and activities that increase pain. But lying in bed or avoiding all movement is only recommended for a short period of time.  Physical therapy, including exercises and stretches. This helps decrease pain and increase movement and function.  Steroid shots into the lower back. This may help relieve symptoms for a time.  Weight-loss program. If you are overweight, losing extra pounds may help relieve symptoms.    In some cases, you may need surgery to fix the underlying problem. This depends on the cause, the symptoms, and how long the pain has lasted.    Home care  Follow these guidelines when caring for yourself at home:  Rest and relax the muscles. Use a comfortable pillow that supports your head and keeps your spine in a natural (neutral) position. Your head shouldn’t be tilted forward or backward. A rolled-up towel may help for a custom fit. When standing or sitting, keep your neck in line with your body. Keep your head up and shoulders down. Stay away from activities that require you to move your neck a lot.  You can use heat and massage to help ease the pain. Take a hot shower or bath, or use a heating pad. You can also use a cold pack for relief. You can make a cold pack by wrapping a plastic bag of crushed or cubed ice  in a thin towel. Try both heat and cold, and use the method that feels best. Do this for 20 minutes several times a day.  You may use acetaminophen or ibuprofen to control pain, unless another pain medicine was prescribed. If you have chronic liver or kidney disease, talk with your healthcare provider before using these medicines. Also talk with your provider if you’ve had a stomach ulcer or gastrointestinal bleeding.  Reduce stress. Stress can make it longer for your pain to go away.  Do any exercises or stretches that were given to you as part of your discharge plan.  Physical therapy and massages are known to help.  You may need surgery for a more serious injury.    Possible complications  Over time, an irritated and inflamed nerve may become damaged. This may lead to long-lasting (permanent) numbness or weakness in your legs and feet. If symptoms change suddenly or get worse, be sure to let your healthcare provider know.    When to call your healthcare provider  Call your healthcare provider right away if you have any of these:  New pain or pain that gets worse  New or increasing weakness, tingling, or numbness in your leg or foot  Problems controlling your bladder or bowel     For more information regarding your condition please visit [email or web address].    © 3228-6313 [employer]. [address] [place] [address], [place] [address]. All rights reserved. This information is not intended as a substitute for professional medical care. Always follow your healthcare professional's instructions.    What Is Lumbar Epidural Injection?  A lumbar epidural injection, which contains a numbing medicine and a steroid, won’t stop all low back and leg pain. But it can reduce pain and break the pain cycle. This cycle may begin when back pain makes it hard to move. Lack of movement can then slow down the healing process. By getting you back on your feet, the injection can help speed your recovery. Some people may feel more  relief from an injection than others. And some people may need more than one injection to get relief. Usually, no more than 3 injections are done in a 12 month period. If the first injection did not help, it is less likely that another one will help.    There are different approaches to injecting the medication and your provider will select the most appropriate based on a number of factors including but not limited to the severity and location of narrowing and your body mass index:  Interlaminar Epidural Steroid Injection (ILESI): Injecting in between the lamina  Transforaminal Epidural Steroid Injection (TFESI): Injecting through the foramen (where the nerve roots exit the spine)   Caudal Epidural Steroid Injection: Through the base of the sacrum with or without the use of a catheter        A tool for diagnosis  An injection can help locate the source of pain. Also called a selective nerve block or a selective epidural, it numbs the roots of specific nerves. The effect lasts only briefly, but if you feel relief, it may indicate the source of the pain. If you feel no relief, it may mean that the pain’s source is at another level in your spine. Or it may mean that something other than inflammation is causing the pain. Injection results also may be used to help plan back surgery, if needed.  Possible risks and complications  Here are some risks of lumbar epidural:  Spinal headache  Bleeding (rare)  Infection (rare)       Lumbar Epidural Injection: Your Procedure  A lumbar epidural injection is an outpatient procedure. It’s often done in a hospital or an outpatient surgery center. Before your injection, your healthcare provider will discuss how you need to prepare.    Getting ready  You may need to prepare by doing the following:  Give the doctor a list of all medicines you take, including aspirin and anti-inflammatories. (You may need to stop taking some of them before the injection.)  You may be asked not to eat or  drink anything for several hours before check-in.  You may need to arrange for an adult friend or family member to drive you home afterward.      During the procedure  The injection takes just a few minutes. But extra time is needed to get ready. You may be given medicine before the injection to help you relax:  In some cases, monitoring devices may be attached to your chest or side. These devices measure your heart rate, breathing, and blood pressure.  You lie on your stomach or side, depending on where the injection will be given. Your back is cleaned and may be covered with sterile towels.  Medicine is given to numb the skin near the injection site.  If X-ray imaging (fluoroscopy) is to be used, a contrast “dye” may be injected into your back. This helps your doctor get a better image.  A local anesthetic (for numbing), steroids (for reducing inflammation), or both are injected into the epidural space.    The procedure is very safe, but there is a very small risk of infection or local reaction afterward. If you have increasing pain, headaches (especially when standing up) redness, fever, or symptoms of infection, seek medical attention right away.     After the procedure  You’ll spend time in a recovery area after the procedure. Before going home, you may be asked to fill out another survey about your pain.    For more information regarding your condition, you can visit [employer].  This web-site is written by both physicians and non-physicians and has great animations to explain your condition and what can be done about it.      © 8233-0667 [employer]. [address] [place] [address], [place] [address]. All rights reserved. This information is not intended as a substitute for professional medical care. Always follow your healthcare professional's instructions.       PROCEDURE:  Margoth Hardy, our , will call you to schedule your procedure. Her direct phone number is [phone].     ASPIRIN  and NSAID PRODUCTS  The following is a list of medications that either contain aspirin or nonsteroidal anti-inflammatory agents (NSAID's). Please do not take these medications.    OVER-THE-COUNTER:   Please review the following medications and instruct you provider if you are taking them. Certain procedures require medication holds up to 6 days prior to the procedure. For this reason it is necessary to inform you provider of all non-prescription and prescription medications you are taking. When scheduling your procedure, please disclose if you are taking any of the following:    Aspirin (generic):  Brand Names:  Anacin, Anai, Lyon, Aspergum, Aspercin, Aspermin, Aspertab, Back-quell, Duradyne, Empirin, Gemnisyn, Genprin, Gensan, Magnaprin, McNess Pain Tab, Momentum, P-A-C, Pain Reliever Tabs, Tri-Pain Caplets, Vanquish Caplet.  Buffered Aspirin (generic):  Brand Names:  Ascriptin, Bufferin, Ecotrin, Buffaprin, Buffasal, Buffinol, COPE.  BC Powders/Tablets  Goodys Powders  Stanback Powders or Tablets  Excedrin, Excedrin Extra, Excedrin IB  Yajaira [place] or Bromoseltzer  Ibuprofen (generic):  Brand Names:  Advil, Nuprin, Motrin IB, Addapin, Genpril, Ibuprofen 200, Menadol, Midol IB, Dristan Sinus, Ursinus Inlay Tabs, Dimetapp Sinus, Valprin, Haltran Tabs, Jann's Pills, Angelito.  Aspirin Suppositories (generic, any strength)  Naproxen (generic)  Brand Name: Aleve  Pepto Bismol    PRESCRIPTION:  Brand Name Generic Name    Fiorinal  butalbital, aspirin, caffeine    Naprosyn, Anaprox  naproxen    Voltaren, Cataflam  diclofenac    Feldene  piroxicam    Motrin (Rx), Rufen  ibuprofen    Ansaid  flurbiprofen    Orudis  ketoprofen    Dolobid  diflunisol    Clinoril  sulindac    Indocin  indomethacin    Tolectin  tolmetin    Azdone Tabs  aspirin plus hydrocodone    Percodan  aspirin plus oxycodone    Synalgos  aspirin plus dihydrocodeine    Daypro  oxaprozin    Lodine  otodolac    Meclomen  meclofenamate    Nalfon  fenoprofen     Ponstel (mefenamic acid)     Relafen  nabumetone    Toradol  ketorolac     If you have a headache, backache, arthritis or other painful condition, please take Tylenol, Datril or some other non aspirin-containing product.  Hold ASPIRIN for 6 days prior to procedure until 24 hours following the procedure.  Hold NAPROXEN for 4 days prior to procedure and 24 hours following.    Hold DICLOFENAC GEL for 24 hours prior to procedure and 24 hours following.    Hold IBUPROFEN for 24 hours prior to procedure and 24 hours following.       We will call you with x-ray results and recommendations.    We highly recommend using [facility], if you do not already have this. You can request access via the internet or by simply talking with a  at any of the clinics.   [email or web address]    Office hours are 8:00 am to 5:00 pm Monday through Friday. If it is urgent that you speak with someone outside of these hours, our [facility] Center will be able to assist you.   If you have any questions or concerns prior to your next office visit, please call our Pain Line: [phone].     Locations:  [facility]  [address] [place] [address]    [facility]  [address] [place] [id number]    [facility]  [address] [place] [address]    Thank you for choosing [facility]!

## 2023-08-10 ENCOUNTER — TRANSCRIPTION ENCOUNTER (OUTPATIENT)
Age: 63
End: 2023-08-10

## 2023-08-10 ENCOUNTER — INPATIENT (INPATIENT)
Facility: HOSPITAL | Age: 63
LOS: 0 days | Discharge: HOME | DRG: 269 | End: 2023-08-11
Attending: SURGERY | Admitting: SURGERY
Payer: MEDICARE

## 2023-08-10 VITALS
OXYGEN SATURATION: 95 % | HEIGHT: 74 IN | SYSTOLIC BLOOD PRESSURE: 156 MMHG | WEIGHT: 298.95 LBS | DIASTOLIC BLOOD PRESSURE: 92 MMHG | TEMPERATURE: 98 F | HEART RATE: 83 BPM | RESPIRATION RATE: 18 BRPM

## 2023-08-10 DIAGNOSIS — Z90.49 ACQUIRED ABSENCE OF OTHER SPECIFIED PARTS OF DIGESTIVE TRACT: Chronic | ICD-10-CM

## 2023-08-10 DIAGNOSIS — Z95.5 PRESENCE OF CORONARY ANGIOPLASTY IMPLANT AND GRAFT: Chronic | ICD-10-CM

## 2023-08-10 DIAGNOSIS — Z98.890 OTHER SPECIFIED POSTPROCEDURAL STATES: Chronic | ICD-10-CM

## 2023-08-10 DIAGNOSIS — I71.40 ABDOMINAL AORTIC ANEURYSM, WITHOUT RUPTURE, UNSPECIFIED: ICD-10-CM

## 2023-08-10 LAB
ANION GAP SERPL CALC-SCNC: 13 MMOL/L — SIGNIFICANT CHANGE UP (ref 7–14)
ANION GAP SERPL CALC-SCNC: 9 MMOL/L — SIGNIFICANT CHANGE UP (ref 7–14)
BASOPHILS # BLD AUTO: 0.07 K/UL — SIGNIFICANT CHANGE UP (ref 0–0.2)
BASOPHILS NFR BLD AUTO: 0.7 % — SIGNIFICANT CHANGE UP (ref 0–1)
BUN SERPL-MCNC: 17 MG/DL — SIGNIFICANT CHANGE UP (ref 10–20)
BUN SERPL-MCNC: 19 MG/DL — SIGNIFICANT CHANGE UP (ref 10–20)
CALCIUM SERPL-MCNC: 8.7 MG/DL — SIGNIFICANT CHANGE UP (ref 8.4–10.5)
CALCIUM SERPL-MCNC: 9.1 MG/DL — SIGNIFICANT CHANGE UP (ref 8.4–10.5)
CHLORIDE SERPL-SCNC: 101 MMOL/L — SIGNIFICANT CHANGE UP (ref 98–110)
CHLORIDE SERPL-SCNC: 102 MMOL/L — SIGNIFICANT CHANGE UP (ref 98–110)
CK MB CFR SERPL CALC: 1.4 NG/ML — SIGNIFICANT CHANGE UP (ref 0.6–6.3)
CK SERPL-CCNC: 173 U/L — SIGNIFICANT CHANGE UP (ref 0–225)
CO2 SERPL-SCNC: 22 MMOL/L — SIGNIFICANT CHANGE UP (ref 17–32)
CO2 SERPL-SCNC: 22 MMOL/L — SIGNIFICANT CHANGE UP (ref 17–32)
CREAT SERPL-MCNC: 1.1 MG/DL — SIGNIFICANT CHANGE UP (ref 0.7–1.5)
CREAT SERPL-MCNC: 1.3 MG/DL — SIGNIFICANT CHANGE UP (ref 0.7–1.5)
EGFR: 62 ML/MIN/1.73M2 — SIGNIFICANT CHANGE UP
EGFR: 76 ML/MIN/1.73M2 — SIGNIFICANT CHANGE UP
EOSINOPHIL # BLD AUTO: 0.24 K/UL — SIGNIFICANT CHANGE UP (ref 0–0.7)
EOSINOPHIL NFR BLD AUTO: 2.6 % — SIGNIFICANT CHANGE UP (ref 0–8)
GLUCOSE BLDC GLUCOMTR-MCNC: 111 MG/DL — HIGH (ref 70–99)
GLUCOSE BLDC GLUCOMTR-MCNC: 152 MG/DL — HIGH (ref 70–99)
GLUCOSE BLDC GLUCOMTR-MCNC: 161 MG/DL — HIGH (ref 70–99)
GLUCOSE SERPL-MCNC: 146 MG/DL — HIGH (ref 70–99)
GLUCOSE SERPL-MCNC: 179 MG/DL — HIGH (ref 70–99)
HCT VFR BLD CALC: 37.9 % — LOW (ref 42–52)
HCT VFR BLD CALC: 40.9 % — LOW (ref 42–52)
HGB BLD-MCNC: 13 G/DL — LOW (ref 14–18)
HGB BLD-MCNC: 13.9 G/DL — LOW (ref 14–18)
IMM GRANULOCYTES NFR BLD AUTO: 0.5 % — HIGH (ref 0.1–0.3)
LACTATE SERPL-SCNC: 2 MMOL/L — SIGNIFICANT CHANGE UP (ref 0.7–2)
LYMPHOCYTES # BLD AUTO: 1.87 K/UL — SIGNIFICANT CHANGE UP (ref 1.2–3.4)
LYMPHOCYTES # BLD AUTO: 19.9 % — LOW (ref 20.5–51.1)
MAGNESIUM SERPL-MCNC: 1.8 MG/DL — SIGNIFICANT CHANGE UP (ref 1.8–2.4)
MAGNESIUM SERPL-MCNC: 1.9 MG/DL — SIGNIFICANT CHANGE UP (ref 1.8–2.4)
MCHC RBC-ENTMCNC: 29.2 PG — SIGNIFICANT CHANGE UP (ref 27–31)
MCHC RBC-ENTMCNC: 29.8 PG — SIGNIFICANT CHANGE UP (ref 27–31)
MCHC RBC-ENTMCNC: 34 G/DL — SIGNIFICANT CHANGE UP (ref 32–37)
MCHC RBC-ENTMCNC: 34.3 G/DL — SIGNIFICANT CHANGE UP (ref 32–37)
MCV RBC AUTO: 85.9 FL — SIGNIFICANT CHANGE UP (ref 80–94)
MCV RBC AUTO: 86.9 FL — SIGNIFICANT CHANGE UP (ref 80–94)
MONOCYTES # BLD AUTO: 1.1 K/UL — HIGH (ref 0.1–0.6)
MONOCYTES NFR BLD AUTO: 11.7 % — HIGH (ref 1.7–9.3)
NEUTROPHILS # BLD AUTO: 6.06 K/UL — SIGNIFICANT CHANGE UP (ref 1.4–6.5)
NEUTROPHILS NFR BLD AUTO: 64.6 % — SIGNIFICANT CHANGE UP (ref 42.2–75.2)
NRBC # BLD: 0 /100 WBCS — SIGNIFICANT CHANGE UP (ref 0–0)
NRBC # BLD: 0 /100 WBCS — SIGNIFICANT CHANGE UP (ref 0–0)
PHOSPHATE SERPL-MCNC: 3.9 MG/DL — SIGNIFICANT CHANGE UP (ref 2.1–4.9)
PHOSPHATE SERPL-MCNC: 3.9 MG/DL — SIGNIFICANT CHANGE UP (ref 2.1–4.9)
PLATELET # BLD AUTO: 265 K/UL — SIGNIFICANT CHANGE UP (ref 130–400)
PLATELET # BLD AUTO: 288 K/UL — SIGNIFICANT CHANGE UP (ref 130–400)
PMV BLD: 9.4 FL — SIGNIFICANT CHANGE UP (ref 7.4–10.4)
PMV BLD: 9.6 FL — SIGNIFICANT CHANGE UP (ref 7.4–10.4)
POTASSIUM SERPL-MCNC: 4.4 MMOL/L — SIGNIFICANT CHANGE UP (ref 3.5–5)
POTASSIUM SERPL-MCNC: 4.4 MMOL/L — SIGNIFICANT CHANGE UP (ref 3.5–5)
POTASSIUM SERPL-SCNC: 4.4 MMOL/L — SIGNIFICANT CHANGE UP (ref 3.5–5)
POTASSIUM SERPL-SCNC: 4.4 MMOL/L — SIGNIFICANT CHANGE UP (ref 3.5–5)
RBC # BLD: 4.36 M/UL — LOW (ref 4.7–6.1)
RBC # BLD: 4.76 M/UL — SIGNIFICANT CHANGE UP (ref 4.7–6.1)
RBC # FLD: 13.8 % — SIGNIFICANT CHANGE UP (ref 11.5–14.5)
RBC # FLD: 14 % — SIGNIFICANT CHANGE UP (ref 11.5–14.5)
SODIUM SERPL-SCNC: 132 MMOL/L — LOW (ref 135–146)
SODIUM SERPL-SCNC: 137 MMOL/L — SIGNIFICANT CHANGE UP (ref 135–146)
TROPONIN T SERPL-MCNC: <0.01 NG/ML — SIGNIFICANT CHANGE UP
WBC # BLD: 10.65 K/UL — SIGNIFICANT CHANGE UP (ref 4.8–10.8)
WBC # BLD: 9.39 K/UL — SIGNIFICANT CHANGE UP (ref 4.8–10.8)
WBC # FLD AUTO: 10.65 K/UL — SIGNIFICANT CHANGE UP (ref 4.8–10.8)
WBC # FLD AUTO: 9.39 K/UL — SIGNIFICANT CHANGE UP (ref 4.8–10.8)

## 2023-08-10 PROCEDURE — C1769: CPT

## 2023-08-10 PROCEDURE — 80048 BASIC METABOLIC PNL TOTAL CA: CPT

## 2023-08-10 PROCEDURE — 93005 ELECTROCARDIOGRAM TRACING: CPT

## 2023-08-10 PROCEDURE — 36415 COLL VENOUS BLD VENIPUNCTURE: CPT

## 2023-08-10 PROCEDURE — 71045 X-RAY EXAM CHEST 1 VIEW: CPT

## 2023-08-10 PROCEDURE — 82553 CREATINE MB FRACTION: CPT

## 2023-08-10 PROCEDURE — 34713 PERQ ACCESS & CLSR FEM ART: CPT | Mod: 50

## 2023-08-10 PROCEDURE — C1874: CPT

## 2023-08-10 PROCEDURE — C1725: CPT

## 2023-08-10 PROCEDURE — C1751: CPT

## 2023-08-10 PROCEDURE — C1760: CPT

## 2023-08-10 PROCEDURE — 71045 X-RAY EXAM CHEST 1 VIEW: CPT | Mod: 26

## 2023-08-10 PROCEDURE — 82962 GLUCOSE BLOOD TEST: CPT

## 2023-08-10 PROCEDURE — 99291 CRITICAL CARE FIRST HOUR: CPT

## 2023-08-10 PROCEDURE — 82550 ASSAY OF CK (CPK): CPT

## 2023-08-10 PROCEDURE — 84484 ASSAY OF TROPONIN QUANT: CPT

## 2023-08-10 PROCEDURE — 85025 COMPLETE CBC W/AUTO DIFF WBC: CPT

## 2023-08-10 PROCEDURE — 83605 ASSAY OF LACTIC ACID: CPT

## 2023-08-10 PROCEDURE — C1889: CPT

## 2023-08-10 PROCEDURE — C1894: CPT

## 2023-08-10 PROCEDURE — 34705 EVAC RPR A-BIILIAC NDGFT: CPT

## 2023-08-10 PROCEDURE — C1887: CPT

## 2023-08-10 PROCEDURE — 74018 RADEX ABDOMEN 1 VIEW: CPT

## 2023-08-10 PROCEDURE — 93010 ELECTROCARDIOGRAM REPORT: CPT

## 2023-08-10 PROCEDURE — 83735 ASSAY OF MAGNESIUM: CPT

## 2023-08-10 PROCEDURE — 85027 COMPLETE CBC AUTOMATED: CPT

## 2023-08-10 PROCEDURE — 84100 ASSAY OF PHOSPHORUS: CPT

## 2023-08-10 RX ORDER — DILTIAZEM HCL 120 MG
2.5 CAPSULE, EXT RELEASE 24 HR ORAL
Qty: 125 | Refills: 0 | Status: DISCONTINUED | OUTPATIENT
Start: 2023-08-10 | End: 2023-08-10

## 2023-08-10 RX ORDER — LABETALOL HCL 100 MG
10 TABLET ORAL ONCE
Refills: 0 | Status: COMPLETED | OUTPATIENT
Start: 2023-08-10 | End: 2023-08-10

## 2023-08-10 RX ORDER — DILTIAZEM HCL 120 MG
360 CAPSULE, EXT RELEASE 24 HR ORAL DAILY
Refills: 0 | Status: DISCONTINUED | OUTPATIENT
Start: 2023-08-10 | End: 2023-08-10

## 2023-08-10 RX ORDER — CHLORHEXIDINE GLUCONATE 213 G/1000ML
1 SOLUTION TOPICAL
Refills: 0 | Status: DISCONTINUED | OUTPATIENT
Start: 2023-08-10 | End: 2023-08-10

## 2023-08-10 RX ORDER — GLUCAGON INJECTION, SOLUTION 0.5 MG/.1ML
1 INJECTION, SOLUTION SUBCUTANEOUS ONCE
Refills: 0 | Status: DISCONTINUED | OUTPATIENT
Start: 2023-08-10 | End: 2023-08-11

## 2023-08-10 RX ORDER — ACETAMINOPHEN 500 MG
650 TABLET ORAL EVERY 6 HOURS
Refills: 0 | Status: DISCONTINUED | OUTPATIENT
Start: 2023-08-10 | End: 2023-08-11

## 2023-08-10 RX ORDER — HYDROMORPHONE HYDROCHLORIDE 2 MG/ML
0.5 INJECTION INTRAMUSCULAR; INTRAVENOUS; SUBCUTANEOUS
Refills: 0 | Status: DISCONTINUED | OUTPATIENT
Start: 2023-08-10 | End: 2023-08-10

## 2023-08-10 RX ORDER — CHLORHEXIDINE GLUCONATE 213 G/1000ML
1 SOLUTION TOPICAL
Refills: 0 | Status: DISCONTINUED | OUTPATIENT
Start: 2023-08-10 | End: 2023-08-11

## 2023-08-10 RX ORDER — DEXTROSE 50 % IN WATER 50 %
12.5 SYRINGE (ML) INTRAVENOUS ONCE
Refills: 0 | Status: DISCONTINUED | OUTPATIENT
Start: 2023-08-10 | End: 2023-08-11

## 2023-08-10 RX ORDER — SODIUM CHLORIDE 9 MG/ML
1000 INJECTION, SOLUTION INTRAVENOUS
Refills: 0 | Status: DISCONTINUED | OUTPATIENT
Start: 2023-08-10 | End: 2023-08-10

## 2023-08-10 RX ORDER — DEXTROSE 50 % IN WATER 50 %
15 SYRINGE (ML) INTRAVENOUS ONCE
Refills: 0 | Status: DISCONTINUED | OUTPATIENT
Start: 2023-08-10 | End: 2023-08-10

## 2023-08-10 RX ORDER — ACETAMINOPHEN 500 MG
1000 TABLET ORAL ONCE
Refills: 0 | Status: COMPLETED | OUTPATIENT
Start: 2023-08-10 | End: 2023-08-10

## 2023-08-10 RX ORDER — DILTIAZEM HCL 120 MG
360 CAPSULE, EXT RELEASE 24 HR ORAL DAILY
Refills: 0 | Status: DISCONTINUED | OUTPATIENT
Start: 2023-08-10 | End: 2023-08-11

## 2023-08-10 RX ORDER — DEXTROSE 50 % IN WATER 50 %
25 SYRINGE (ML) INTRAVENOUS ONCE
Refills: 0 | Status: DISCONTINUED | OUTPATIENT
Start: 2023-08-10 | End: 2023-08-11

## 2023-08-10 RX ORDER — MAGNESIUM SULFATE 500 MG/ML
2 VIAL (ML) INJECTION ONCE
Refills: 0 | Status: COMPLETED | OUTPATIENT
Start: 2023-08-10 | End: 2023-08-10

## 2023-08-10 RX ORDER — ATORVASTATIN CALCIUM 80 MG/1
80 TABLET, FILM COATED ORAL AT BEDTIME
Refills: 0 | Status: DISCONTINUED | OUTPATIENT
Start: 2023-08-10 | End: 2023-08-11

## 2023-08-10 RX ORDER — SODIUM CHLORIDE 9 MG/ML
1000 INJECTION, SOLUTION INTRAVENOUS
Refills: 0 | Status: DISCONTINUED | OUTPATIENT
Start: 2023-08-10 | End: 2023-08-11

## 2023-08-10 RX ORDER — HYDROMORPHONE HYDROCHLORIDE 2 MG/ML
1 INJECTION INTRAMUSCULAR; INTRAVENOUS; SUBCUTANEOUS
Refills: 0 | Status: DISCONTINUED | OUTPATIENT
Start: 2023-08-10 | End: 2023-08-10

## 2023-08-10 RX ORDER — LISINOPRIL 2.5 MG/1
40 TABLET ORAL EVERY 12 HOURS
Refills: 0 | Status: DISCONTINUED | OUTPATIENT
Start: 2023-08-10 | End: 2023-08-10

## 2023-08-10 RX ORDER — LISINOPRIL 2.5 MG/1
20 TABLET ORAL EVERY 12 HOURS
Refills: 0 | Status: DISCONTINUED | OUTPATIENT
Start: 2023-08-11 | End: 2023-08-11

## 2023-08-10 RX ORDER — INSULIN LISPRO 100/ML
VIAL (ML) SUBCUTANEOUS
Refills: 0 | Status: DISCONTINUED | OUTPATIENT
Start: 2023-08-10 | End: 2023-08-11

## 2023-08-10 RX ORDER — NICARDIPINE HYDROCHLORIDE 30 MG/1
2.5 CAPSULE, EXTENDED RELEASE ORAL
Qty: 40 | Refills: 0 | Status: DISCONTINUED | OUTPATIENT
Start: 2023-08-10 | End: 2023-08-10

## 2023-08-10 RX ORDER — TRAZODONE HCL 50 MG
50 TABLET ORAL ONCE
Refills: 0 | Status: COMPLETED | OUTPATIENT
Start: 2023-08-10 | End: 2023-08-10

## 2023-08-10 RX ORDER — MEPERIDINE HYDROCHLORIDE 50 MG/ML
12.5 INJECTION INTRAMUSCULAR; INTRAVENOUS; SUBCUTANEOUS
Refills: 0 | Status: DISCONTINUED | OUTPATIENT
Start: 2023-08-10 | End: 2023-08-10

## 2023-08-10 RX ORDER — GABAPENTIN 400 MG/1
100 CAPSULE ORAL EVERY 8 HOURS
Refills: 0 | Status: DISCONTINUED | OUTPATIENT
Start: 2023-08-10 | End: 2023-08-11

## 2023-08-10 RX ORDER — ONDANSETRON 8 MG/1
4 TABLET, FILM COATED ORAL ONCE
Refills: 0 | Status: DISCONTINUED | OUTPATIENT
Start: 2023-08-10 | End: 2023-08-10

## 2023-08-10 RX ORDER — SIMETHICONE 80 MG/1
80 TABLET, CHEWABLE ORAL DAILY
Refills: 0 | Status: DISCONTINUED | OUTPATIENT
Start: 2023-08-10 | End: 2023-08-11

## 2023-08-10 RX ADMIN — HYDROMORPHONE HYDROCHLORIDE 0.5 MILLIGRAM(S): 2 INJECTION INTRAMUSCULAR; INTRAVENOUS; SUBCUTANEOUS at 11:15

## 2023-08-10 RX ADMIN — Medication 100 MILLIGRAM(S): at 21:03

## 2023-08-10 RX ADMIN — Medication 1000 MILLIGRAM(S): at 11:25

## 2023-08-10 RX ADMIN — Medication 360 MILLIGRAM(S): at 17:43

## 2023-08-10 RX ADMIN — Medication 650 MILLIGRAM(S): at 17:43

## 2023-08-10 RX ADMIN — ATORVASTATIN CALCIUM 80 MILLIGRAM(S): 80 TABLET, FILM COATED ORAL at 21:03

## 2023-08-10 RX ADMIN — HYDROMORPHONE HYDROCHLORIDE 0.5 MILLIGRAM(S): 2 INJECTION INTRAMUSCULAR; INTRAVENOUS; SUBCUTANEOUS at 11:25

## 2023-08-10 RX ADMIN — Medication 25 GRAM(S): at 13:40

## 2023-08-10 RX ADMIN — Medication 10 MILLIGRAM(S): at 18:02

## 2023-08-10 RX ADMIN — HYDROMORPHONE HYDROCHLORIDE 0.5 MILLIGRAM(S): 2 INJECTION INTRAMUSCULAR; INTRAVENOUS; SUBCUTANEOUS at 10:45

## 2023-08-10 RX ADMIN — SIMETHICONE 80 MILLIGRAM(S): 80 TABLET, CHEWABLE ORAL at 18:46

## 2023-08-10 RX ADMIN — HYDROMORPHONE HYDROCHLORIDE 0.5 MILLIGRAM(S): 2 INJECTION INTRAMUSCULAR; INTRAVENOUS; SUBCUTANEOUS at 10:55

## 2023-08-10 RX ADMIN — Medication 10 MILLIGRAM(S): at 20:48

## 2023-08-10 RX ADMIN — Medication 50 MILLIGRAM(S): at 21:51

## 2023-08-10 RX ADMIN — Medication 400 MILLIGRAM(S): at 10:55

## 2023-08-10 RX ADMIN — Medication 100 MILLIGRAM(S): at 14:03

## 2023-08-10 NOTE — BRIEF OPERATIVE NOTE - OPERATION/FINDINGS
Procedure:   1. US guided R CFA access 18Fr sheath  2. US guided L CFA access 12Fr sheath  3. Aortogram  4. Placement Hickman Excluded endograft 28.5mm x 14.5mm x 12cm mainbody; 18mm x 11.5cm right iliac limb; 20mm x 11.5cm L iliac limb  5. Perclose x 2 bilateral common femoral artery    Findings: good seal post stent placement with filling of BL renal arteries

## 2023-08-10 NOTE — CONSULT NOTE ADULT - ATTENDING COMMENTS
This note reflects my exam and care of this patient on 08/10/2023    Patient had EVAR with Vasc Surgery.  Hypertensive postop to -170 - required iv Labetalol 10 mg.  Pain is tolerable.    ASSESSMENT:  64 y/o male with Abdominal Aortic Aneurism.  S/P EVAR.  Acute postoperative pain.  Hypertensive urgency.  At risk for hemodynamic instability.  DM.    PLAN:  - pain  control  - continuous O2 Sat and hemodynamic monitoring  - keep normotensive - use Labetalol iv; consider Cardene drip as needed  - liquid diet  - follow serum electrolytes and UOP  - ID: universal precautions  - DVT prophylaxis  Admit to SICU    This note reflects my exam and care of this patient on 08/10/2023.

## 2023-08-10 NOTE — BRIEF OPERATIVE NOTE - NSICDXBRIEFPROCEDURE_GEN_ALL_CORE_FT
PROCEDURES:  Endovascular repair of infrarenal abdominal aortic aneurysm with extension prosthesis 10-Aug-2023 10:28:14  Isabella Medrano

## 2023-08-10 NOTE — CHART NOTE - NSCHARTNOTEFT_GEN_A_CORE
Vascular Surgery Post-Op Note,  SPECTRA 6058    CC: AAA, s/p EVAR  Pre-Op Dx: Abdominal aortic aneurysm (AAA) without rupture    AAA (abdominal aortic aneurysm)      Procedure: Endovascular repair of infrarenal abdominal aortic aneurysm with extension prosthesis      Surgeon: seen and examined at bedside. Denies abd pain, back pain, legs numbness    Subjective:    Vital Signs Last 24 Hrs  T(C): 36.7 (10 Aug 2023 10:23), Max: 36.8 (10 Aug 2023 06:38)  T(F): 98.1 (10 Aug 2023 10:23), Max: 98.3 (10 Aug 2023 06:38)  HR: 77 (10 Aug 2023 13:00) (70 - 83)  BP: 139/80 (10 Aug 2023 13:00) (112/61 - 156/92)  BP(mean): 105 (10 Aug 2023 13:00) (79 - 105)  RR: 20 (10 Aug 2023 13:00) (13 - 20)  SpO2: 98% (10 Aug 2023 13:00) (95% - 99%)    Parameters below as of 10 Aug 2023 13:00  Patient On (Oxygen Delivery Method): nasal cannula  O2 Flow (L/min): 2      Physical Exam:  General: NAD, resting comfortably in bed  Pulmonary: Nonlabored breathing, no respiratory distress  Cardiovascular: NSR  Abdominal: soft, NT/ND  Extremities: WWP, normal strength  Skin: b/l groins - no hematoma, rash, ecchymosis  Neuro: A/O x 3, CNs II-XII grossly intact, normal motor/sensation, no focal deficits  Pulses:   Right:                                                                          Left:  FEM [ ]2+ [ ]1+ [ ]doppler                                             FEM [ ]2+ [ ]1+ [ ]doppler    POP [ ]2+ [ ]1+ [ ]doppler                                             POP [ ]2+ [ ]1+ [ ]doppler    DP [ x]2+ [ ]1+ [ ]doppler                                                DP [ x]2+ [ ]1+ [ ]doppler  PT[ x]2+ [ ]1+ [ ]doppler                                                  PT [ x]2+ [ ]1+ [ ]doppler      LABS:                        13.0   9.39  )-----------( 288      ( 10 Aug 2023 11:03 )             37.9     08-10    132<L>  |  101  |  19  ----------------------------<  179<H>  4.4   |  22  |  1.3    Ca    8.7      10 Aug 2023 11:03  Phos  3.9     08-10  Mg     1.8     08-10        CAPILLARY BLOOD GLUCOSE      POCT Blood Glucose.: 152 mg/dL (10 Aug 2023 12:36)  POCT Blood Glucose.: 161 mg/dL (10 Aug 2023 06:43)      Urinalysis Basic - ( 10 Aug 2023 11:03 )    Color: x / Appearance: x / SG: x / pH: x  Gluc: 179 mg/dL / Ketone: x  / Bili: x / Urobili: x   Blood: x / Protein: x / Nitrite: x   Leuk Esterase: x / RBC: x / WBC x   Sq Epi: x / Non Sq Epi: x / Bacteria: x        Radiology and Additional Studies:    Assessment:63y Male s/p EVAR    Plan:  Pain/nausea control PRN  Home meds  Incentive spirometer  Bed rest overnight  Vitals per protocol  IVF, Diet: regular  Discontinue rosado at midnight, TOV  AM labs    SPECTRA 6012

## 2023-08-10 NOTE — CONSULT NOTE ADULT - SUBJECTIVE AND OBJECTIVE BOX
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 Patient had 4.9cm AAA that was noted to be increasing in size and was taken for elective EVAR today. Prior to procedure, he had received cardiology clearance with Dr. Corrigan and was deemed RCRI Class III risk, with no further cardiac testing indicated. Last echo 7/12/23 with LVEF 55% and no significant valvular abnormalities.      Per vascular, no complications during case. Endovascular access obtained through 18Fr sheath in R CFA and 12 Fr sheath in L CFA. Endografts placed in iliac limb. Perclose x 2 in b/l CFA.     Patient successfully extubated after case to NC. No intraoperative complications per anesthesia. Patient hemodynamically stable throughout case, no blood or vasopressors required.     Patient seen and examined in PACU by SICU team. SBP 130s, 2+ palpable DP/PT pulses bilaterally. Groin access sites C/D/I, no hematoma. Patient  A&O x3, denied CP, SOB, nausea, or abdominal pain. Saturating well on 3L NC. EKG with NSR..  Procedure:  OR Stats  OR Time: 1 hr 45  mins  EBL: 200   IV Fluids: 2L   UOP 4000  Blood Products: none  UOP:      Findings-  1. US guided R CFA access 18Fr sheath  2. US guided L CFA access 12Fr sheath  3. Aortogram  4. Placement [place] Excluded endograft 28.5mm x 14.5mm x 12cm mainbody; 18mm x 11.5cm right iliac limb; 20mm x 11.5cm L iliac limb  5. Perclose x 2 bilateral common femoral artery    Findings: good seal post stent placement with filling of BL renal arteries       24 Hour Events  -Admission under SICU service      [X] A ten-point review of systems was otherwise negative except as noted above.  [  ] Due to altered mental status/intubation, subjective information was not attained from the patient. History was obtained, to the extent possible, from review of the chart and collateral sources of information.    =========================================================================================================================================      PMH  PAST MEDICAL & SURGICAL HISTORY:  HTN (hypertension)      Diabetes      Aortic aneurysm      Thyroid disease      Skull fracture      CAD (coronary artery disease)      CVA (cerebral vascular accident)      History of appendectomy      History of hand surgery      S/P cardiac cath      H/O heart artery stent        Home Meds:  Home Medications:  clopidogrel 75 mg oral tablet: 1 orally once a day (10 Aug 2023 06:36)  dilTIAZem 360 mg/24 hours oral tablet, extended release: 1 tab(s) orally once a day (10 Aug 2023 06:36)  glimepiride 2 mg oral tablet: 1 orally once a day (10 Aug 2023 06:36)  metFORMIN 1000 mg oral tablet: 1 orally once a day (10 Aug 2023 06:36)  Mounjaro 10 mg/0.5 mL subcutaneous solution: 10 subcutaneously once a week (10 Aug 2023 06:36)  ramipril 10 mg oral capsule: 1 orally 2 times a day (10 Aug 2023 06:36)  Vascepa 1 g oral capsule: 2 orally 2 times a day (10 Aug 2023 06:36)     Allergies  Allergies    penicillin (Unknown)    Intolerances       Current Medications:  acetaminophen   IVPB .. 1000 milliGRAM(s) IV Intermittent once  atorvastatin 80 milliGRAM(s) Oral at bedtime  chlorhexidine 4% Liquid 1 Application(s) Topical <User Schedule>  clindamycin IVPB 600 milliGRAM(s) IV Intermittent every 8 hours  dextrose 5%. 1000 milliLiter(s) (100 mL/Hr) IV Continuous <Continuous>  dextrose 5%. 1000 milliLiter(s) (50 mL/Hr) IV Continuous <Continuous>  dextrose 50% Injectable 25 Gram(s) IV Push once  dextrose 50% Injectable 25 Gram(s) IV Push once  dextrose 50% Injectable 12.5 Gram(s) IV Push once  dextrose Oral Gel 15 Gram(s) Oral once PRN Blood Glucose LESS THAN 70 milliGRAM(s)/deciliter  diltiazem    milliGRAM(s) Oral daily  glucagon  Injectable 1 milliGRAM(s) IntraMuscular once  HYDROmorphone  Injectable 0.5 milliGRAM(s) IV Push every 10 minutes PRN Moderate Pain (4 - 6)  HYDROmorphone  Injectable 1 milliGRAM(s) IV Push every 10 minutes PRN Severe Pain (7 - 10)  insulin lispro (ADMELOG) corrective regimen sliding scale   SubCutaneous three times a day before meals  lactated ringers. 1000 milliLiter(s) (75 mL/Hr) IV Continuous <Continuous>  meperidine     Injectable 12.5 milliGRAM(s) IV Push every 10 minutes PRN Shivering  ondansetron Injectable 4 milliGRAM(s) IV Push once PRN Nausea and/or Vomiting      VITAL SIGNS, INS/OUTS (Last 24Hours)  ICU Vital Signs Last 24 Hrs  T(C): 36.7 (10 Aug 2023 10:23), Max: 36.8 (10 Aug 2023 06:38)  T(F): 98.1 (10 Aug 2023 10:23), Max: 98.3 (10 Aug 2023 06:38)  HR: 76 (10 Aug 2023 11:00) (70 - 83)  BP: 116/72 (10 Aug 2023 11:00) (116/72 - 156/92)  BP(mean): 90 (10 Aug 2023 11:00) (90 - 102)  ABP: 128/63 (10 Aug 2023 11:00) (128/63 - 149/69)  ABP(mean): 86 (10 Aug 2023 11:00) (86 - 102)  RR: 13 (10 Aug 2023 11:00) (13 - 18)  SpO2: 98% (10 Aug 2023 11:00) (95% - 99%)    O2 Parameters below as of 10 Aug 2023 11:00  Patient On (Oxygen Delivery Method): nasal cannula  O2 Flow (L/min): 3        I&O's Summary          Physical Exam:   ----------------------------------------------------------------------------------------------------------  GCS:      Exam: A&Ox3, no focal deficits    RESPIRATORY: Saturating well on 3L NC. Lungs CTAB.     CARDIOVASCULAR:   S1/S2.  RRR    GASTROINTESTINAL:  Abdomen soft, non-tender, non-distended.    MUSCULOSKELETAL:  Extremities warm, pink, well-perfused. 2+ DP/PT pulses bilateraly.     DERM:  No skin breakdown     :   Exam: Rutledge catheter in place. Groin access sites covered with dressing C/D/I, no hematoma.     Height (cm): 188 (08-10-23)  Weight (kg): 135.6 (08-10-23)  BMI (kg/m2): 38.4 (08-10-23)  BSA (m2): 2.58 (08-10-23)    Tubes/Lines/Drains   ----------------------------------------------------------------------------------------------------------  [x] Peripheral IV  [X] Urinary Catheter Rutledge   Date Placed: intraop 08/10  [X] Arterial Line L radial  Date Placed:  intraop 08/10

## 2023-08-10 NOTE — CONSULT NOTE ADULT - ASSESSMENT
Assessment & Plan    63y Male with HTN,  HLD, DM, CAD s/p PCI, basilar artery CVA, and AAA now s/p EVAR admitted to SICU for Q1 vascular checks    NEURO:  #Acute pain  -control with APAP ATC, gabapentin 100 TID   #hx basilar artery CVA in November 2018   -no residual deficits    RESP:   #Oxygenation    -wean off NC to RA as tolerated  -encourage IS  -f/u post op CXR  #Activity    -increase as tolerated    CARDIOVASCULAR  #AAA s/p EVAR   -q1 vascular checks  -keep SBP normotensive (110-150)  #CAD s/p PCI in December 2018  -holding home plavix per vascular   -stress test 05/06/22 with no perfusion deficits  #hx HTN  -continue home diltiazem w/ hold parameters  -HOLDING home ramipril  #hx HLD  -c/w atorvastatin 80 qhs  Echo: 07/12/23- LVEF 55%, no significant valvular abnormalities  GI/NUTR:   #Diet, DASH/TLC  -aspiration precautions, HOB 30  #GI Prophylaxis    -not indicated  #Bowel regimen    -not indicated  -monitor BM    /RENAL:   #urine output in crtically ill  -rosado catheter in place, monitor I&O  #maintain euvolemia  -LR @ 75/hr     Labs:          BUN/Cr- 19/1.3  -->          Electrolytes-Na 132 // K 4.4 // Mg 1.8 //  Phos 3.9 (08-10 @ 11:03)  -Cr 1.4 on previous admission, continue to monitor            HEME/ONC:   #DVT ppx  -HOLDING per vascular surgery   #Labs: Hb/Hct:  13.0/37.9  (08-10 @ 11:03)  -->                      Plts:  288  -->                 PTT/INR:          ID:  #leukocytosis   WBC- 9.39  --->>  Temp trend- 24hrs T(F): 98.1 (08-10 @ 10:23), Max: 98.3 (08-10 @ 06:38)  Current antibiotics-clindamycin IVPB 600 every 8 hours    ENDO:    #type II DM  -on ISS  -keep -180; ACHS fingersticks  -home meds: glimepiride, mounjaro, metformin  #hx hypothyroidism  MSK:  #activity  -flat for 2 hours postop  -bedrest until AM    LINES/DRAINS:  PIV, Rosado, R radial a-line    ADVANCED DIRECTIVES:  Full Code    INDICATION FOR SICU: Abdominal aortic aneurysm (AAA) without rupture      DISPO:   SICU. Case discussed with attending Dr. Bernal.

## 2023-08-11 ENCOUNTER — TRANSCRIPTION ENCOUNTER (OUTPATIENT)
Age: 63
End: 2023-08-11

## 2023-08-11 VITALS
DIASTOLIC BLOOD PRESSURE: 75 MMHG | SYSTOLIC BLOOD PRESSURE: 159 MMHG | HEART RATE: 58 BPM | TEMPERATURE: 98 F | OXYGEN SATURATION: 98 % | RESPIRATION RATE: 17 BRPM

## 2023-08-11 LAB
GLUCOSE BLDC GLUCOMTR-MCNC: 172 MG/DL — HIGH (ref 70–99)
GLUCOSE BLDC GLUCOMTR-MCNC: 198 MG/DL — HIGH (ref 70–99)

## 2023-08-11 PROCEDURE — 99233 SBSQ HOSP IP/OBS HIGH 50: CPT

## 2023-08-11 PROCEDURE — 71045 X-RAY EXAM CHEST 1 VIEW: CPT | Mod: 26

## 2023-08-11 RX ORDER — CLOPIDOGREL BISULFATE 75 MG/1
75 TABLET, FILM COATED ORAL DAILY
Refills: 0 | Status: DISCONTINUED | OUTPATIENT
Start: 2023-08-11 | End: 2023-08-11

## 2023-08-11 RX ORDER — ACETAMINOPHEN 500 MG
2 TABLET ORAL
Qty: 0 | Refills: 0 | DISCHARGE
Start: 2023-08-11

## 2023-08-11 RX ADMIN — Medication 100 MILLIGRAM(S): at 05:12

## 2023-08-11 RX ADMIN — Medication 1: at 10:45

## 2023-08-11 RX ADMIN — Medication 1: at 06:20

## 2023-08-11 RX ADMIN — CHLORHEXIDINE GLUCONATE 1 APPLICATION(S): 213 SOLUTION TOPICAL at 05:12

## 2023-08-11 RX ADMIN — LISINOPRIL 20 MILLIGRAM(S): 2.5 TABLET ORAL at 05:13

## 2023-08-11 RX ADMIN — Medication 360 MILLIGRAM(S): at 09:01

## 2023-08-11 NOTE — PROGRESS NOTE ADULT - ASSESSMENT
Assessment & Plan    63y Male with HTN,  HLD, DM, CAD s/p PCI, basilar artery CVA, and AAA now s/p EVAR admitted to SICU for Q1 vascular checks    NEURO:  #Acute pain  -control with APAP ATC, gabapentin 100 TID   #hx basilar artery CVA in November 2018   -no residual deficits    RESP:   #Oxygenation    -wean off NC to RA as tolerated  -encourage IS  #Activity    -bedrest until AM     CARDIOVASCULAR  #AAA s/p EVAR   -q1 vascular checks  -keep SBP normotensive (110-150)  #CAD s/p PCI in December 2018  -holding home plavix per vascular   -stress test 05/06/22 with no perfusion deficits  #hx HTN  -continue home diltiazem w/ hold parameters  -started lisinopril 20mg q12 (takes ramipril 10mg BID at home)  -10 labetalol x 1 given 8/10 day and 10 mg labetalol given at night x1  #hx HLD  -c/w atorvastatin 80 qhs  Echo: 07/12/23- LVEF 55%, no significant valvular abnormalities    GI/NUTR:   #Diet, DASH/TLC  -aspiration precautions, HOB 30  #GI Prophylaxis    -not indicated  #gas pain  -simethicone 80mg   #Bowel regimen    -not indicated  -monitor BM    /RENAL:   #urine output in crtically ill  -dc rosado at midnight --> pending TOV  #maintain euvolemia  - IVL    Labs:          BUN/Cr- 19/1.3  -->,  17/1.1  -->          Electrolytes-Na 137 // K 4.4 // Mg 1.9 //  Phos 3.9 (08-10 @ 20:09)  -Cr 1.4 on previous admission, continue to monitor          HEME/ONC:   #DVT ppx  -HOLDING per vascular surgery    Labs: Hb/Hct:  13.0/37.9  -->,  13.9/40.9  -->                      Plts:  288  -->,  265  -->                 PTT/INR:        ID:  #leukocytosis   WBC- 9.39  --->>,  10.65  --->>  Temp trend- 24hrs T(F): 98 (08-11 @ 00:00), Max: 98.4 (08-10 @ 20:00)  Current antibiotics-clindamycin IVPB 600 every 8 hours    ENDO:    #type II DM  -on ISS  -keep -180; ACHS fingersticks  -home meds: glimepiride, mounjaro, metformin  #hx hypothyroidism  MSK:  #activity  -flat for 2 hours postop  -bedrest until AM    LINES/DRAINS:  PIV, Rosado, R radial a-line    ADVANCED DIRECTIVES:  Full Code    INDICATION FOR SICU: Abdominal aortic aneurysm (AAA) without rupture      DISPO:   SICU. Case discussed with attending Dr. Bernal.

## 2023-08-11 NOTE — DISCHARGE NOTE PROVIDER - NSDCMRMEDTOKEN_GEN_ALL_CORE_FT
acetaminophen 325 mg oral tablet: 2 tab(s) orally every 6 hours  atorvastatin 80 mg oral tablet: 1 tab(s) orally once a day (at bedtime)  clopidogrel 75 mg oral tablet: 1 orally once a day  dilTIAZem 360 mg/24 hours oral tablet, extended release: 1 tab(s) orally once a day  glimepiride 2 mg oral tablet: 1 orally once a day  metFORMIN 1000 mg oral tablet: 1 orally once a day  Mounjaro 10 mg/0.5 mL subcutaneous solution: 10 subcutaneously once a week  ramipril 10 mg oral capsule: 1 orally 2 times a day  Vascepa 1 g oral capsule: 2 orally 2 times a day

## 2023-08-11 NOTE — PROGRESS NOTE ADULT - ATTENDING COMMENTS
POD 1  Patient was hypertensive again last night and required Labetalol iv  This am .  Pain is controlled.  Using IS.    ASSESSMENT:  64 y/o male with Abdominal Aortic Aneurism.  S/P EVAR.  Acute postoperative pain.  Hypertensive urgency.  At risk for hemodynamic instability.  DM.    PLAN:  - encourage IS  - keep normotensive - start home po meds  - carb consistent diet  - OOB  - DVT prophylaxis

## 2023-08-11 NOTE — DISCHARGE NOTE NURSING/CASE MANAGEMENT/SOCIAL WORK - NSDCPEFALRISK_GEN_ALL_CORE
For information on Fall & Injury Prevention, visit: [email or web address] OR  [email or web address] OR  https://www.cdc.gov/steadi/patient.html

## 2023-08-11 NOTE — DISCHARGE NOTE PROVIDER - CARE PROVIDER_API CALL
Robin Muro  Vascular Surgery  [address] [place] [address], [address]  [place] [address]  Phone: [phone]  Fax: [phone]  Follow Up Time: 2 weeks

## 2023-08-11 NOTE — DISCHARGE NOTE NURSING/CASE MANAGEMENT/SOCIAL WORK - PATIENT PORTAL LINK FT
You can access the FollowMyHealth Patient Portal offered by [facility] by registering at the following website: [email or web address]. By joining [employer]’s FollowMyHealth portal, you will also be able to view your health information using other applications (apps) compatible with our system.

## 2023-08-11 NOTE — DISCHARGE NOTE PROVIDER - HOSPITAL COURSE
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 Patient had 4.9cm AAA that was noted to be increasing in size. Pt underwent EVAR   No intra-op or post op complications. On POD 1 rosado removed,  pt voided, tolerated regular diet, ambulated    All his home medications resumed. He is instructed to follow up with Dr. 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 Patient had 4.9cm AAA that was noted to be increasing in size. Pt underwent EVAR   No intra-op or post op complications. On POD 1 rosado removed,  pt voided, tolerated regular diet, ambulated    All his home medications resumed. He is instructed to follow up with Dr. Muro in 2 weeks

## 2023-08-11 NOTE — PROGRESS NOTE ADULT - ASSESSMENT
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 Muro:  - regular diet  - ambulate  - TOV  - ok to have DVT prophylaxis  - anticipate for discharge home today    SPECTRA [id number]

## 2023-08-11 NOTE — PROGRESS NOTE ADULT - SUBJECTIVE AND OBJECTIVE BOX
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 Patient had 4.9cm AAA that was noted to be increasing in size and was taken for elective EVAR today. Prior to procedure, he had received cardiology clearance with Dr. Corrigan and was deemed RCRI Class III risk, with no further cardiac testing indicated. Last echo 7/12/23 with LVEF 55% and no significant valvular abnormalities.      Per vascular, no complications during case. Endovascular access obtained through 18Fr sheath in R CFA and 12 Fr sheath in L CFA. Endografts placed in iliac limb. Perclose x 2 in b/l CFA.     Patient successfully extubated after case to NC. No intraoperative complications per anesthesia. Patient hemodynamically stable throughout case, no blood or vasopressors required.     Patient seen and examined in PACU by SICU team. SBP 130s, 2+ palpable DP/PT pulses bilaterally. Groin access sites C/D/I, no hematoma. Patient  A&O x3, denied CP, SOB, nausea, or abdominal pain. Saturating well on 3L NC. EKG with NSR..  Procedure:  OR Stats  OR Time: 1 hr 45  mins  EBL: 200   IV Fluids: 2L     Blood Products: none  UOP:      Findings-  1. US guided R CFA access 18Fr sheath  2. US guided L CFA access 12Fr sheath  3. Aortogram  4. Placement [place] Excluded endograft 28.5mm x 14.5mm x 12cm mainbody; 18mm x 11.5cm right iliac limb; 20mm x 11.5cm L iliac limb  5. Perclose x 2 bilateral common femoral artery    Findings: good seal post stent placement with filling of BL renal arteries       24 Hour Events  8/10   NIGHT  - rosado out at midnight  - re-started lisinopril 20mg q12 (takes 10 mg ramipril BID at home, therapeutic interchange is 40mg lisinopril)  - 50 mg trazodone given for anxiety/insomnia  - hypertensive to 170s, 10mg labetalol given, BP gradually improved to systolic 120s after labetalol and addressing his concerns and worries  - IVL, tolerating PO diet    DAY  admitted to SICU  2g mg  discontinue rosado at midnight   , not endorsing pain--> 10 labetalol x 1  simethicone added for gas pain        [X] A ten-point review of systems was otherwise negative except as noted above.  [  ] Due to altered mental status/intubation, subjective information was not attained from the patient. History was obtained, to the extent possible, from review of the chart and collateral sources of information.   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 Patient had 4.9cm AAA that was noted to be increasing in size and was taken for elective EVAR today. Prior to procedure, he had received cardiology clearance with Dr. Corrigan and was deemed RCRI Class III risk, with no further cardiac testing indicated. Last echo 7/12/23 with LVEF 55% and no significant valvular abnormalities.      Per vascular, no complications during case. Endovascular access obtained through 18Fr sheath in R CFA and 12 Fr sheath in L CFA. Endografts placed in iliac limb. Perclose x 2 in b/l CFA.     Patient successfully extubated after case to NC. No intraoperative complications per anesthesia. Patient hemodynamically stable throughout case, no blood or vasopressors required.     Patient seen and examined in PACU by SICU team. SBP 130s, 2+ palpable DP/PT pulses bilaterally. Groin access sites C/D/I, no hematoma. Patient  A&O x3, denied CP, SOB, nausea, or abdominal pain. Saturating well on 3L NC. EKG with NSR..  Procedure:  OR Stats  OR Time: 1 hr 45  mins  EBL: 200   IV Fluids: 2L     Blood Products: none  UOP:      Findings-  1. US guided R CFA access 18Fr sheath  2. US guided L CFA access 12Fr sheath  3. Aortogram  4. Placement [place] Excluded endograft 28.5mm x 14.5mm x 12cm mainbody; 18mm x 11.5cm right iliac limb; 20mm x 11.5cm L iliac limb  5. Perclose x 2 bilateral common femoral artery    Findings: good seal post stent placement with filling of BL renal arteries       24 Hour Events  8/10   NIGHT  - rosado out at midnight  - re-started lisinopril 20mg q12 (takes 10 mg ramipril BID at home, therapeutic interchange is 40mg lisinopril)  - 50 mg trazodone given for anxiety/insomnia  - hypertensive to 170s, 10mg labetalol given, BP gradually improved to systolic 120s after labetalol and addressing his concerns and worries  - IVL, tolerating PO diet  Daily     Daily     Diet, DASH/TLC:   Sodium & Cholesterol Restricted  Consistent Carbohydrate No Snacks (08-10-23 @ 12:10)      CURRENT MEDS:  Neurologic Medications  acetaminophen     Tablet .. 650 milliGRAM(s) Oral every 6 hours  gabapentin 100 milliGRAM(s) Oral every 8 hours    Respiratory Medications    Cardiovascular Medications  diltiazem    milliGRAM(s) Oral daily  lisinopril 20 milliGRAM(s) Oral every 12 hours    Gastrointestinal Medications  simethicone 80 milliGRAM(s) Chew daily    Genitourinary Medications    Hematologic/Oncologic Medications  clopidogrel Tablet 75 milliGRAM(s) Oral daily    Antimicrobial/Immunologic Medications  clindamycin IVPB 600 milliGRAM(s) IV Intermittent every 8 hours    Endocrine/Metabolic Medications  atorvastatin 80 milliGRAM(s) Oral at bedtime  insulin lispro (ADMELOG) corrective regimen sliding scale   SubCutaneous three times a day before meals    Topical/Other Medications  chlorhexidine 2% Cloths 1 Application(s) Topical <User Schedule>      ICU Vital Signs Last 24 Hrs  T(C): 36.7 (11 Aug 2023 08:00), Max: 36.9 (10 Aug 2023 20:00)  T(F): 98 (11 Aug 2023 08:00), Max: 98.4 (10 Aug 2023 20:00)  HR: 57 (11 Aug 2023 10:00) (56 - 99)  BP: 143/76 (11 Aug 2023 08:00) (111/65 - 181/100)  BP(mean): 100 (11 Aug 2023 08:00) (83 - 133)  ABP: 148/68 (11 Aug 2023 10:00) (95/64 - 185/102)  ABP(mean): 92 (11 Aug 2023 10:00) (69 - 312)  RR: 16 (11 Aug 2023 10:00) (15 - 29)  SpO2: 97% (11 Aug 2023 10:00) (95% - 100%)    O2 Parameters below as of 11 Aug 2023 10:00  Patient On (Oxygen Delivery Method): room air                    I&O's Summary    10 Aug 2023 07:01  -  11 Aug 2023 07:00  --------------------------------------------------------  IN: 1345 mL / OUT: 1885 mL / NET: -540 mL    11 Aug 2023 07:01  -  11 Aug 2023 11:42  --------------------------------------------------------  IN: 0 mL / OUT: 500 mL / NET: -500 mL      I&O's Detail    10 Aug 2023 07:01  -  11 Aug 2023 07:00  --------------------------------------------------------  IN:    IV PiggyBack: 100 mL    Lactated Ringers: 1125 mL    Oral Fluid: 120 mL  Total IN: 1345 mL    OUT:    Indwelling Catheter - Urethral (mL): 1885 mL  Total OUT: 1885 mL    Total NET: -540 mL      11 Aug 2023 07:01  -  11 Aug 2023 11:42  --------------------------------------------------------  IN:  Total IN: 0 mL    OUT:    Voided (mL): 500 mL  Total OUT: 500 mL    Total NET: -500 mL          PHYSICAL EXAM:    General/Neuro: A&O x 3. No focal deficits.     Lungs: Saturating well on RA. Clear to auscultation, Normal expansion/effort.     Cardiovascular : S1, S2.  Regular rate and rhythm.     GI: Abdomen soft, Non-tender, Non-distended.    Extremities: Extremities warm, pink, well-perfused. 2+ palpable DP/PT bilaterally.     Derm: Good skin turgor, no skin breakdown.      : Voiding      CXR:     LABS:  CAPILLARY BLOOD GLUCOSE      POCT Blood Glucose.: 172 mg/dL (11 Aug 2023 10:14)  POCT Blood Glucose.: 198 mg/dL (11 Aug 2023 06:13)  POCT Blood Glucose.: 111 mg/dL (10 Aug 2023 16:12)  POCT Blood Glucose.: 152 mg/dL (10 Aug 2023 12:36)                          13.9   10.65 )-----------( 265      ( 10 Aug 2023 20:09 )             40.9       08-10    137  |  102  |  17  ----------------------------<  146<H>  4.4   |  22  |  1.1    Ca    9.1      10 Aug 2023 20:09  Phos  3.9     08-10  Mg     1.9     08-10          CARDIAC MARKERS ( 10 Aug 2023 11:03 )  x     / <0.01 ng/mL / 173 U/L / x     / 1.4 ng/mL      Urinalysis Basic - ( 10 Aug 2023 20:09 )    Color: x / Appearance: x / SG: x / pH: x  Gluc: 146 mg/dL / Ketone: x  / Bili: x / Urobili: x   Blood: x / Protein: x / Nitrite: x   Leuk Esterase: x / RBC: x / WBC x   Sq Epi: x / Non Sq Epi: x / Bacteria: x          DAY  admitted to SICU  2g mg  discontinue rosado at midnight   , not endorsing pain--> 10 labetalol x 1  simethicone added for gas pain        [X] A ten-point review of systems was otherwise negative except as noted above.  [  ] Due to altered mental status/intubation, subjective information was not attained from the patient. History was obtained, to the extent possible, from review of the chart and collateral sources of information.

## 2023-08-11 NOTE — DISCHARGE NOTE PROVIDER - NSDCCPCAREPLAN_GEN_ALL_CORE_FT
PRINCIPAL DISCHARGE DIAGNOSIS  Diagnosis: AAA (abdominal aortic aneurysm)  Assessment and Plan of Treatment: s/p EVAR. Remove dressings on Saturday. You may shower. Wear loose fitting underwear. Call Dr. Muro's office with any questions or complanes. Go to ED with groin pains, swelling, oozing, leg numbness, abdominal or back pain. Please, make an appointment to follow up in 2 weeks

## 2023-08-11 NOTE — PROGRESS NOTE ADULT - SUBJECTIVE AND OBJECTIVE BOX
VASCULAR SURGERY PROGRESS NOTE    CC: large AAA  Hospital Day #1  Post-Op Day #1    Procedure: s/p EVAR    Events of past 24 hours: seen and examined. Denies abd pain, back pain, shortness of breath or chest pain          ROS otherwise negative except per subjective and HPI      PAST MEDICAL & SURGICAL HISTORY:  HTN (hypertension)      Diabetes      Aortic aneurysm      Thyroid disease      Skull fracture      CAD (coronary artery disease)      CVA (cerebral vascular accident)      History of appendectomy      History of hand surgery      S/P cardiac cath      H/O heart artery stent          Vital Signs Last 24 Hrs  T(C): 36.7 (11 Aug 2023 08:00), Max: 36.9 (10 Aug 2023 20:00)  T(F): 98 (11 Aug 2023 08:00), Max: 98.4 (10 Aug 2023 20:00)  HR: 83 (11 Aug 2023 08:00) (56 - 99)  BP: 143/76 (11 Aug 2023 08:00) (111/65 - 181/100)  BP(mean): 100 (11 Aug 2023 08:00) (79 - 133)  RR: 26 (11 Aug 2023 08:00) (13 - 27)  SpO2: 99% (11 Aug 2023 08:00) (95% - 100%)    Parameters below as of 11 Aug 2023 08:00  Patient On (Oxygen Delivery Method): room air        Pain (0-10):            Pain Control Adequate: [] YES [] N    Diet:    I&O's Detail    10 Aug 2023 07:01  -  11 Aug 2023 07:00  --------------------------------------------------------  IN:    IV PiggyBack: 100 mL    Lactated Ringers: 1125 mL    Oral Fluid: 120 mL  Total IN: 1345 mL    OUT:    Indwelling Catheter - Urethral (mL): 1885 mL  Total OUT: 1885 mL    Total NET: -540 mL          Bowel Movement: : [] YES [x] NO  Flatus: : [x] YES [] NO    PHYSICAL EXAM    Appearance: Normal	  HEENT:   Normal oral mucosa, PERRL, EOMI	  Neck: Supple, - JVD;   Cardiovascular: Normal S1 S2, No JVD, No murmurs,   Respiratory: Lungs clear to auscultation/No Rales, Rhonchi, Wheezing	  Gastrointestinal:  Soft, Non-tender, + BS	  Skin: No rashes, No ecchymoses, No cyanosis  Extremities: Normal range of motion, No clubbing, cyanosis or edema  b/l groins: no hematoma/swelling/oozing  Neurologic: Non-focal  Psychiatry: A & O x 3, Mood & affect appropriate    PULSES:  Femoral:  Popliteal:  Dorsal Pedal: palpable b/l  Posterior Tibial: palpable b/l  Capillary:      MEDICATIONS:   MEDICATIONS  (STANDING):  acetaminophen     Tablet .. 650 milliGRAM(s) Oral every 6 hours  atorvastatin 80 milliGRAM(s) Oral at bedtime  chlorhexidine 2% Cloths 1 Application(s) Topical <User Schedule>  clindamycin IVPB 600 milliGRAM(s) IV Intermittent every 8 hours  dextrose 50% Injectable 25 Gram(s) IV Push once  dextrose 50% Injectable 25 Gram(s) IV Push once  dextrose 50% Injectable 12.5 Gram(s) IV Push once  diltiazem    milliGRAM(s) Oral daily  gabapentin 100 milliGRAM(s) Oral every 8 hours  glucagon  Injectable 1 milliGRAM(s) IntraMuscular once  insulin lispro (ADMELOG) corrective regimen sliding scale   SubCutaneous three times a day before meals  lisinopril 20 milliGRAM(s) Oral every 12 hours  simethicone 80 milliGRAM(s) Chew daily    MEDICATIONS  (PRN):      DVT PROPHYLAXIS: [] YES [x] NO  GI PROPHYLAXIS: [] YES [x] NO  ANTIPLATELETS: [] YES [x] NO  ANTICOAGULATION: [] YES [x] NO  ANTIBIOTICS: [] YES [x] NO    LAB/STUDIES:                        13.9   10.65 )-----------( 265      ( 10 Aug 2023 20:09 )             40.9     08-10    137  |  102  |  17  ----------------------------<  146<H>  4.4   |  22  |  1.1    Ca    9.1      10 Aug 2023 20:09  Phos  3.9     08-10  Mg     1.9     08-10            Urinalysis Basic - ( 10 Aug 2023 20:09 )    Color: x / Appearance: x / SG: x / pH: x  Gluc: 146 mg/dL / Ketone: x  / Bili: x / Urobili: x   Blood: x / Protein: x / Nitrite: x   Leuk Esterase: x / RBC: x / WBC x   Sq Epi: x / Non Sq Epi: x / Bacteria: x      CARDIAC MARKERS ( 10 Aug 2023 11:03 )  x     / <0.01 ng/mL / 173 U/L / x     / 1.4 ng/mL                IMAGING:   Xray Chest 1 View- PORTABLE-Routine (08.11.23 @ 07:04) >  No radiographic evidence of acute cardiopulmonary disease.    Grossly unchanged.

## 2023-08-11 NOTE — DISCHARGE NOTE PROVIDER - NSDCFUSCHEDAPPT_GEN_ALL_CORE_FT
[facility]  Scheduled Appointment: 08/28/2023    Jonas Rogel  [facility]  [facility] [address]  Scheduled Appointment: 08/28/2023

## 2023-08-11 NOTE — DISCHARGE NOTE PROVIDER - NSDCCPTREATMENT_GEN_ALL_CORE_FT
PRINCIPAL PROCEDURE  Procedure: Endovascular repair of infrarenal abdominal aortic aneurysm with extension prosthesis  Findings and Treatment:

## 2023-08-14 PROBLEM — I25.10 ATHEROSCLEROTIC HEART DISEASE OF NATIVE CORONARY ARTERY WITHOUT ANGINA PECTORIS: Chronic | Status: ACTIVE | Noted: 2023-07-27

## 2023-08-16 DIAGNOSIS — E78.5 HYPERLIPIDEMIA, UNSPECIFIED: ICD-10-CM

## 2023-08-16 DIAGNOSIS — G47.00 INSOMNIA, UNSPECIFIED: ICD-10-CM

## 2023-08-16 DIAGNOSIS — Z79.02 LONG TERM (CURRENT) USE OF ANTITHROMBOTICS/ANTIPLATELETS: ICD-10-CM

## 2023-08-16 DIAGNOSIS — I25.10 ATHEROSCLEROTIC HEART DISEASE OF NATIVE CORONARY ARTERY WITHOUT ANGINA PECTORIS: ICD-10-CM

## 2023-08-16 DIAGNOSIS — I71.43 INFRARENAL ABDOMINAL AORTIC ANEURYSM, WITHOUT RUPTURE: ICD-10-CM

## 2023-08-16 DIAGNOSIS — Z88.0 ALLERGY STATUS TO PENICILLIN: ICD-10-CM

## 2023-08-16 DIAGNOSIS — I16.0 HYPERTENSIVE URGENCY: ICD-10-CM

## 2023-08-16 DIAGNOSIS — E03.9 HYPOTHYROIDISM, UNSPECIFIED: ICD-10-CM

## 2023-08-16 DIAGNOSIS — I10 ESSENTIAL (PRIMARY) HYPERTENSION: ICD-10-CM

## 2023-08-16 DIAGNOSIS — F41.9 ANXIETY DISORDER, UNSPECIFIED: ICD-10-CM

## 2023-08-16 DIAGNOSIS — E11.9 TYPE 2 DIABETES MELLITUS WITHOUT COMPLICATIONS: ICD-10-CM

## 2023-08-16 DIAGNOSIS — Z95.5 PRESENCE OF CORONARY ANGIOPLASTY IMPLANT AND GRAFT: ICD-10-CM

## 2023-08-16 DIAGNOSIS — Z86.73 PERSONAL HISTORY OF TRANSIENT ISCHEMIC ATTACK (TIA), AND CEREBRAL INFARCTION WITHOUT RESIDUAL DEFICITS: ICD-10-CM

## 2023-08-16 DIAGNOSIS — Z79.84 LONG TERM (CURRENT) USE OF ORAL HYPOGLYCEMIC DRUGS: ICD-10-CM

## 2023-08-25 ENCOUNTER — APPOINTMENT (OUTPATIENT)
Dept: VASCULAR SURGERY | Facility: CLINIC | Age: 63
End: 2023-08-25
Payer: MEDICARE

## 2023-08-25 VITALS
BODY MASS INDEX: 37.35 KG/M2 | WEIGHT: 291 LBS | SYSTOLIC BLOOD PRESSURE: 138 MMHG | HEIGHT: 74 IN | DIASTOLIC BLOOD PRESSURE: 83 MMHG

## 2023-08-25 PROCEDURE — 99024 POSTOP FOLLOW-UP VISIT: CPT

## 2023-08-25 NOTE — REASON FOR VISIT
[de-identified] : sp EVAR [de-identified] : 8/6/23 [de-identified] : The patient is a 63-year-old male who presents for his initial postoperative visit status post endovascular abdominal arctic aneurysm repair.  The patient tolerated the procedure well.  The patient states he feels some fatigue and not quite back to himself yet

## 2023-08-25 NOTE — DISCUSSION/SUMMARY
[FreeTextEntry1] : The patient is 2 weeks status post endovascular abdominal arctic aneurysm repair.  The patient tolerated the procedure well.  I reassured the patient he should get his strength back in the next 2 to 3 weeks.  I would like to see him back in my office in 6 months' time at that time I will obtain a CT scan of his abdomen and pelvis to evaluate his endovascular repair.  My ultrasound technician attempted to perform an aortic duplex today however it is a very limited study secondary to abdominal girth.

## 2023-08-25 NOTE — PHYSICAL EXAM
[Abdominal Aorta] : Normal abdominal aorta [2+] : left 2+ [FreeTextEntry1] : Bilateral groin punctures no evidence of ecchymosis or hematoma present

## 2023-08-28 ENCOUNTER — APPOINTMENT (OUTPATIENT)
Dept: HEMATOLOGY ONCOLOGY | Facility: CLINIC | Age: 63
End: 2023-08-28
Payer: MEDICARE

## 2023-08-28 ENCOUNTER — OUTPATIENT (OUTPATIENT)
Dept: OUTPATIENT SERVICES | Facility: HOSPITAL | Age: 63
LOS: 1 days | End: 2023-08-28
Payer: MEDICARE

## 2023-08-28 VITALS
WEIGHT: 290 LBS | TEMPERATURE: 97.3 F | RESPIRATION RATE: 14 BRPM | SYSTOLIC BLOOD PRESSURE: 129 MMHG | HEART RATE: 82 BPM | BODY MASS INDEX: 37.22 KG/M2 | DIASTOLIC BLOOD PRESSURE: 80 MMHG | HEIGHT: 74 IN

## 2023-08-28 DIAGNOSIS — Z98.890 OTHER SPECIFIED POSTPROCEDURAL STATES: Chronic | ICD-10-CM

## 2023-08-28 DIAGNOSIS — M89.9 DISORDER OF BONE, UNSPECIFIED: ICD-10-CM

## 2023-08-28 DIAGNOSIS — Z90.49 ACQUIRED ABSENCE OF OTHER SPECIFIED PARTS OF DIGESTIVE TRACT: Chronic | ICD-10-CM

## 2023-08-28 DIAGNOSIS — Z80.7 FAMILY HISTORY OF OTHER MALIGNANT NEOPLASMS OF LYMPHOID, HEMATOPOIETIC AND RELATED TISSUES: ICD-10-CM

## 2023-08-28 DIAGNOSIS — Z95.5 PRESENCE OF CORONARY ANGIOPLASTY IMPLANT AND GRAFT: Chronic | ICD-10-CM

## 2023-08-28 DIAGNOSIS — N28.9 DISORDER OF KIDNEY AND URETER, UNSPECIFIED: ICD-10-CM

## 2023-08-28 PROCEDURE — 99204 OFFICE O/P NEW MOD 45 MIN: CPT

## 2023-08-28 NOTE — END OF VISIT
[] : Fellow [FreeTextEntry3] :  left lower renal pole soft tissue density lesion and R iliac sclerotic bone lesion measuring 2.1 cm, previously 1.3 cm in 2/20/2018. will check MR abdomen and MR pelvis bone and will call wit resutls and procede from there

## 2023-08-28 NOTE — ASSESSMENT
[FreeTextEntry1] : Mr. Conway is an ex-smoker (quitted in 2018) with PMH of stroke on plavix, AAA s/p repair, CAD s/p stent, HLD, lumbar radiculopathy, DM, and HTN presented to the oncology clinic after he had a CT AP incidentally showed a 2.6 cm L lower renal pole lesion and a 2.1 cm R iliac sclerotic bone lesion.   # Stable 2.6 cm indeterminate lobular left lower renal pole soft tissue density lesion  - pt was previously seen by urologist Dr. Marvin and recommended MR AP w/wo contrast (was not done)  # R iliac sclerotic bone lesion measuring 2.1 cm, previously 1.3 cm in 2/20/2018 - doubt this is malignancy given the slow growth  - blood work showed normal CBC, normal protein gap, albumin and kidney function - PSA done in 2019 was normal   Plan: - will order MR Abdomen w/wo IV contrast (Renal protocol) to characterize renal lesion - will order MR pelvis bone w/wo IV contrast for R iliac bone lesion - will notify pt of the results once imaging are completed  - encourage age-appropriate cancer screening including colonoscopy - f/u with Dr. Muro as scheduled  RTC if findings on MR suggest further workup; otherwise pt can follow up with his PCP and specialists.  no

## 2023-08-28 NOTE — CONSULT LETTER
[Dear  ___] : Dear  [unfilled], [Consult Letter:] : I had the pleasure of evaluating your patient, [unfilled]. [Please see my note below.] : Please see my note below. [Consult Closing:] : Thank you very much for allowing me to participate in the care of this patient.  If you have any questions, please do not hesitate to contact me. [Sincerely,] : Sincerely, [FreeTextEntry3] : Dr. Rogel

## 2023-08-28 NOTE — REVIEW OF SYSTEMS
[Abdominal Pain] : abdominal pain [Negative] : Heme/Lymph [FreeTextEntry7] : Abdominal pain due to recent AAA surgery.

## 2023-08-28 NOTE — HISTORY OF PRESENT ILLNESS
[de-identified] : 63 year old M ex-smoker (quitted in 2018) with PMH of stroke on plavix, AAA s/p repair, CAD s/p stent, HLD, lumbar radiculopathy, DM, and HTN presented to the oncology clinic after he had a CT AP showing L renal and R iliac bone lesion.   He was following with his vascular surgeon for his AAA. He had a routine CT AP done on 6/29/23 showed 4.7 x 4.6 x 6 cm infrarenal abdominal aortic aneurysm, essentially stable in size. In addition to the AAA finding, it incidentally showed unchanged lobular 2.6 cm cm left lower renal pole soft tissue density lesion and slowly increasing in size is a right iliac sclerotic bone lesion measuring 2.1 cm and measured 1.3 cm on CT scan dated 2/20/2018. Due to the finding, he was referred to oncology for further workup. Pt had saw urology Dr. Marvin in 3/2021 for his renal lesion. At that time, he recommended MR Abdomen w/wo contrast to further characterize the renal lesion, but he never had it done. He was not aware of the bone lesion in 2018.   Today, pt is doing well. He had AAA repair two weeks ago with Dr. Muro. He has mild abdominal pain secondary to the surgery, otherwise he has no new complaint. He denies fever, chill, SOB, night sweat, weight changes, new lumps or bumps.

## 2023-08-28 NOTE — REASON FOR VISIT
[Initial Consultation] : an initial consultation [FreeTextEntry2] : L renal lesion and R iliac bone lesion

## 2023-08-29 DIAGNOSIS — M89.9 DISORDER OF BONE, UNSPECIFIED: ICD-10-CM

## 2023-09-20 ENCOUNTER — RX RENEWAL (OUTPATIENT)
Age: 63
End: 2023-09-20

## 2023-10-06 ENCOUNTER — RESULT REVIEW (OUTPATIENT)
Age: 63
End: 2023-10-06

## 2023-10-06 ENCOUNTER — OUTPATIENT (OUTPATIENT)
Dept: OUTPATIENT SERVICES | Facility: HOSPITAL | Age: 63
LOS: 1 days | End: 2023-10-06
Payer: MEDICARE

## 2023-10-06 DIAGNOSIS — N28.9 DISORDER OF KIDNEY AND URETER, UNSPECIFIED: ICD-10-CM

## 2023-10-06 DIAGNOSIS — Z90.49 ACQUIRED ABSENCE OF OTHER SPECIFIED PARTS OF DIGESTIVE TRACT: Chronic | ICD-10-CM

## 2023-10-06 DIAGNOSIS — Z98.890 OTHER SPECIFIED POSTPROCEDURAL STATES: Chronic | ICD-10-CM

## 2023-10-06 DIAGNOSIS — Z95.5 PRESENCE OF CORONARY ANGIOPLASTY IMPLANT AND GRAFT: Chronic | ICD-10-CM

## 2023-10-06 DIAGNOSIS — M89.9 DISORDER OF BONE, UNSPECIFIED: ICD-10-CM

## 2023-10-06 PROCEDURE — 72196 MRI PELVIS W/DYE: CPT

## 2023-10-06 PROCEDURE — 74183 MRI ABD W/O CNTR FLWD CNTR: CPT

## 2023-10-06 PROCEDURE — 72196 MRI PELVIS W/DYE: CPT | Mod: 26

## 2023-10-06 PROCEDURE — 74183 MRI ABD W/O CNTR FLWD CNTR: CPT | Mod: 26

## 2023-10-06 PROCEDURE — A9579: CPT

## 2023-10-07 DIAGNOSIS — N28.9 DISORDER OF KIDNEY AND URETER, UNSPECIFIED: ICD-10-CM

## 2023-10-07 DIAGNOSIS — M89.9 DISORDER OF BONE, UNSPECIFIED: ICD-10-CM

## 2023-10-31 ENCOUNTER — OUTPATIENT (OUTPATIENT)
Dept: OUTPATIENT SERVICES | Facility: HOSPITAL | Age: 63
LOS: 1 days | End: 2023-10-31
Payer: MEDICARE

## 2023-10-31 VITALS
HEART RATE: 70 BPM | TEMPERATURE: 98 F | RESPIRATION RATE: 18 BRPM | WEIGHT: 294.98 LBS | OXYGEN SATURATION: 96 % | SYSTOLIC BLOOD PRESSURE: 154 MMHG | DIASTOLIC BLOOD PRESSURE: 80 MMHG | HEIGHT: 73 IN

## 2023-10-31 DIAGNOSIS — Z95.828 PRESENCE OF OTHER VASCULAR IMPLANTS AND GRAFTS: Chronic | ICD-10-CM

## 2023-10-31 DIAGNOSIS — Z98.890 OTHER SPECIFIED POSTPROCEDURAL STATES: Chronic | ICD-10-CM

## 2023-10-31 DIAGNOSIS — M89.9 DISORDER OF BONE, UNSPECIFIED: ICD-10-CM

## 2023-10-31 DIAGNOSIS — Z95.5 PRESENCE OF CORONARY ANGIOPLASTY IMPLANT AND GRAFT: Chronic | ICD-10-CM

## 2023-10-31 DIAGNOSIS — Z90.49 ACQUIRED ABSENCE OF OTHER SPECIFIED PARTS OF DIGESTIVE TRACT: Chronic | ICD-10-CM

## 2023-10-31 DIAGNOSIS — Z01.818 ENCOUNTER FOR OTHER PREPROCEDURAL EXAMINATION: ICD-10-CM

## 2023-10-31 LAB
ALBUMIN SERPL ELPH-MCNC: 4.3 G/DL — SIGNIFICANT CHANGE UP (ref 3.5–5.2)
ALBUMIN SERPL ELPH-MCNC: 4.3 G/DL — SIGNIFICANT CHANGE UP (ref 3.5–5.2)
ALP SERPL-CCNC: 108 U/L — SIGNIFICANT CHANGE UP (ref 30–115)
ALP SERPL-CCNC: 108 U/L — SIGNIFICANT CHANGE UP (ref 30–115)
ALT FLD-CCNC: 21 U/L — SIGNIFICANT CHANGE UP (ref 0–41)
ALT FLD-CCNC: 21 U/L — SIGNIFICANT CHANGE UP (ref 0–41)
ANION GAP SERPL CALC-SCNC: 12 MMOL/L — SIGNIFICANT CHANGE UP (ref 7–14)
ANION GAP SERPL CALC-SCNC: 12 MMOL/L — SIGNIFICANT CHANGE UP (ref 7–14)
APTT BLD: 30.2 SEC — SIGNIFICANT CHANGE UP (ref 27–39.2)
APTT BLD: 30.2 SEC — SIGNIFICANT CHANGE UP (ref 27–39.2)
AST SERPL-CCNC: 17 U/L — SIGNIFICANT CHANGE UP (ref 0–41)
AST SERPL-CCNC: 17 U/L — SIGNIFICANT CHANGE UP (ref 0–41)
BASOPHILS # BLD AUTO: 0.06 K/UL — SIGNIFICANT CHANGE UP (ref 0–0.2)
BASOPHILS # BLD AUTO: 0.06 K/UL — SIGNIFICANT CHANGE UP (ref 0–0.2)
BASOPHILS NFR BLD AUTO: 0.7 % — SIGNIFICANT CHANGE UP (ref 0–1)
BASOPHILS NFR BLD AUTO: 0.7 % — SIGNIFICANT CHANGE UP (ref 0–1)
BILIRUB SERPL-MCNC: 0.3 MG/DL — SIGNIFICANT CHANGE UP (ref 0.2–1.2)
BILIRUB SERPL-MCNC: 0.3 MG/DL — SIGNIFICANT CHANGE UP (ref 0.2–1.2)
BUN SERPL-MCNC: 20 MG/DL — SIGNIFICANT CHANGE UP (ref 10–20)
BUN SERPL-MCNC: 20 MG/DL — SIGNIFICANT CHANGE UP (ref 10–20)
CALCIUM SERPL-MCNC: 9.7 MG/DL — SIGNIFICANT CHANGE UP (ref 8.4–10.5)
CALCIUM SERPL-MCNC: 9.7 MG/DL — SIGNIFICANT CHANGE UP (ref 8.4–10.5)
CHLORIDE SERPL-SCNC: 101 MMOL/L — SIGNIFICANT CHANGE UP (ref 98–110)
CHLORIDE SERPL-SCNC: 101 MMOL/L — SIGNIFICANT CHANGE UP (ref 98–110)
CO2 SERPL-SCNC: 23 MMOL/L — SIGNIFICANT CHANGE UP (ref 17–32)
CO2 SERPL-SCNC: 23 MMOL/L — SIGNIFICANT CHANGE UP (ref 17–32)
CREAT SERPL-MCNC: 1.5 MG/DL — SIGNIFICANT CHANGE UP (ref 0.7–1.5)
CREAT SERPL-MCNC: 1.5 MG/DL — SIGNIFICANT CHANGE UP (ref 0.7–1.5)
EGFR: 53 ML/MIN/1.73M2 — LOW
EGFR: 53 ML/MIN/1.73M2 — LOW
EOSINOPHIL # BLD AUTO: 0.31 K/UL — SIGNIFICANT CHANGE UP (ref 0–0.7)
EOSINOPHIL # BLD AUTO: 0.31 K/UL — SIGNIFICANT CHANGE UP (ref 0–0.7)
EOSINOPHIL NFR BLD AUTO: 3.7 % — SIGNIFICANT CHANGE UP (ref 0–8)
EOSINOPHIL NFR BLD AUTO: 3.7 % — SIGNIFICANT CHANGE UP (ref 0–8)
GLUCOSE SERPL-MCNC: 158 MG/DL — HIGH (ref 70–99)
GLUCOSE SERPL-MCNC: 158 MG/DL — HIGH (ref 70–99)
HCT VFR BLD CALC: 44.4 % — SIGNIFICANT CHANGE UP (ref 42–52)
HCT VFR BLD CALC: 44.4 % — SIGNIFICANT CHANGE UP (ref 42–52)
HGB BLD-MCNC: 14.4 G/DL — SIGNIFICANT CHANGE UP (ref 14–18)
HGB BLD-MCNC: 14.4 G/DL — SIGNIFICANT CHANGE UP (ref 14–18)
IMM GRANULOCYTES NFR BLD AUTO: 0.4 % — HIGH (ref 0.1–0.3)
IMM GRANULOCYTES NFR BLD AUTO: 0.4 % — HIGH (ref 0.1–0.3)
INR BLD: 0.94 RATIO — SIGNIFICANT CHANGE UP (ref 0.65–1.3)
INR BLD: 0.94 RATIO — SIGNIFICANT CHANGE UP (ref 0.65–1.3)
LYMPHOCYTES # BLD AUTO: 1.98 K/UL — SIGNIFICANT CHANGE UP (ref 1.2–3.4)
LYMPHOCYTES # BLD AUTO: 1.98 K/UL — SIGNIFICANT CHANGE UP (ref 1.2–3.4)
LYMPHOCYTES # BLD AUTO: 23.5 % — SIGNIFICANT CHANGE UP (ref 20.5–51.1)
LYMPHOCYTES # BLD AUTO: 23.5 % — SIGNIFICANT CHANGE UP (ref 20.5–51.1)
MCHC RBC-ENTMCNC: 27.9 PG — SIGNIFICANT CHANGE UP (ref 27–31)
MCHC RBC-ENTMCNC: 27.9 PG — SIGNIFICANT CHANGE UP (ref 27–31)
MCHC RBC-ENTMCNC: 32.4 G/DL — SIGNIFICANT CHANGE UP (ref 32–37)
MCHC RBC-ENTMCNC: 32.4 G/DL — SIGNIFICANT CHANGE UP (ref 32–37)
MCV RBC AUTO: 86 FL — SIGNIFICANT CHANGE UP (ref 80–94)
MCV RBC AUTO: 86 FL — SIGNIFICANT CHANGE UP (ref 80–94)
MONOCYTES # BLD AUTO: 0.81 K/UL — HIGH (ref 0.1–0.6)
MONOCYTES # BLD AUTO: 0.81 K/UL — HIGH (ref 0.1–0.6)
MONOCYTES NFR BLD AUTO: 9.6 % — HIGH (ref 1.7–9.3)
MONOCYTES NFR BLD AUTO: 9.6 % — HIGH (ref 1.7–9.3)
NEUTROPHILS # BLD AUTO: 5.25 K/UL — SIGNIFICANT CHANGE UP (ref 1.4–6.5)
NEUTROPHILS # BLD AUTO: 5.25 K/UL — SIGNIFICANT CHANGE UP (ref 1.4–6.5)
NEUTROPHILS NFR BLD AUTO: 62.1 % — SIGNIFICANT CHANGE UP (ref 42.2–75.2)
NEUTROPHILS NFR BLD AUTO: 62.1 % — SIGNIFICANT CHANGE UP (ref 42.2–75.2)
NRBC # BLD: 0 /100 WBCS — SIGNIFICANT CHANGE UP (ref 0–0)
NRBC # BLD: 0 /100 WBCS — SIGNIFICANT CHANGE UP (ref 0–0)
PLATELET # BLD AUTO: 331 K/UL — SIGNIFICANT CHANGE UP (ref 130–400)
PLATELET # BLD AUTO: 331 K/UL — SIGNIFICANT CHANGE UP (ref 130–400)
PMV BLD: 9.2 FL — SIGNIFICANT CHANGE UP (ref 7.4–10.4)
PMV BLD: 9.2 FL — SIGNIFICANT CHANGE UP (ref 7.4–10.4)
POTASSIUM SERPL-MCNC: 4.7 MMOL/L — SIGNIFICANT CHANGE UP (ref 3.5–5)
POTASSIUM SERPL-MCNC: 4.7 MMOL/L — SIGNIFICANT CHANGE UP (ref 3.5–5)
POTASSIUM SERPL-SCNC: 4.7 MMOL/L — SIGNIFICANT CHANGE UP (ref 3.5–5)
POTASSIUM SERPL-SCNC: 4.7 MMOL/L — SIGNIFICANT CHANGE UP (ref 3.5–5)
PROT SERPL-MCNC: 6.9 G/DL — SIGNIFICANT CHANGE UP (ref 6–8)
PROT SERPL-MCNC: 6.9 G/DL — SIGNIFICANT CHANGE UP (ref 6–8)
PROTHROM AB SERPL-ACNC: 10.7 SEC — SIGNIFICANT CHANGE UP (ref 9.95–12.87)
PROTHROM AB SERPL-ACNC: 10.7 SEC — SIGNIFICANT CHANGE UP (ref 9.95–12.87)
RBC # BLD: 5.16 M/UL — SIGNIFICANT CHANGE UP (ref 4.7–6.1)
RBC # BLD: 5.16 M/UL — SIGNIFICANT CHANGE UP (ref 4.7–6.1)
RBC # FLD: 13.6 % — SIGNIFICANT CHANGE UP (ref 11.5–14.5)
RBC # FLD: 13.6 % — SIGNIFICANT CHANGE UP (ref 11.5–14.5)
SODIUM SERPL-SCNC: 136 MMOL/L — SIGNIFICANT CHANGE UP (ref 135–146)
SODIUM SERPL-SCNC: 136 MMOL/L — SIGNIFICANT CHANGE UP (ref 135–146)
WBC # BLD: 8.44 K/UL — SIGNIFICANT CHANGE UP (ref 4.8–10.8)
WBC # BLD: 8.44 K/UL — SIGNIFICANT CHANGE UP (ref 4.8–10.8)
WBC # FLD AUTO: 8.44 K/UL — SIGNIFICANT CHANGE UP (ref 4.8–10.8)
WBC # FLD AUTO: 8.44 K/UL — SIGNIFICANT CHANGE UP (ref 4.8–10.8)

## 2023-10-31 PROCEDURE — 99214 OFFICE O/P EST MOD 30 MIN: CPT | Mod: 25

## 2023-10-31 PROCEDURE — 93005 ELECTROCARDIOGRAM TRACING: CPT

## 2023-10-31 PROCEDURE — 80053 COMPREHEN METABOLIC PANEL: CPT

## 2023-10-31 PROCEDURE — 85730 THROMBOPLASTIN TIME PARTIAL: CPT

## 2023-10-31 PROCEDURE — 85025 COMPLETE CBC W/AUTO DIFF WBC: CPT

## 2023-10-31 PROCEDURE — 93010 ELECTROCARDIOGRAM REPORT: CPT

## 2023-10-31 PROCEDURE — 36415 COLL VENOUS BLD VENIPUNCTURE: CPT

## 2023-10-31 PROCEDURE — 85610 PROTHROMBIN TIME: CPT

## 2023-10-31 RX ORDER — TIRZEPATIDE 15 MG/.5ML
10 INJECTION, SOLUTION SUBCUTANEOUS
Refills: 0 | DISCHARGE

## 2023-10-31 NOTE — H&P PST ADULT - REASON FOR ADMISSION
Suite: Interventional RadiologyProceduralist: Saeid Butcher  Confirmed Surgery DateTime: 11- - 0:00PAST DateTime: 10- - 0:00Procedure: Iliac Bx  ERP?: UnavailableLaterality: N/ALength of Procedure: 60 Minutes  Anesthesia Type: Local Standby

## 2023-10-31 NOTE — H&P PST ADULT - NSICDXPASTSURGICALHX_GEN_ALL_CORE_FT
PAST SURGICAL HISTORY:  H/O heart artery stent     History of appendectomy     History of endovascular stent graft for abdominal aortic aneurysm (AAA)     History of hand surgery     S/P cardiac cath

## 2023-10-31 NOTE — H&P PST ADULT - HISTORY OF PRESENT ILLNESS
"I have a lesion on my right hip bone - I've had it for awhile but now it's gotten bigger"    PATIENT CURRENTLY DENIES CHEST PAIN    PALPITATIONS,  DYSURIA, OR URI WITHIN THE IN PAST 2 WEEKS    -Denies travel outside the [place] in the past 30 days  -Patient denies any signs or symptoms of COVID 19 and denies contact with known positive individuals.    -Patient was instructed to quarantine pre-procedure, practice exposure control measures, continue to self-monitor and report any concerns to their proceduralist.  -Pt advised self quarantine till day of procedure    ANESTHESIA ALERT  yes--Difficult Airway  short thick neck  NO--History of neck surgery or radiation  NO--Limited ROM of neck  NO--History of Malignant hyperthermia  NO--No personal or family history of Pseudocholinesterase deficiency.  NO--Prior Anesthesia Complication  NO--Latex Allergy  NO--Loose teeth  NO--History of Rheumatoid Arthritis  NO--Bleeding risk  NO--BARRERA  NO--Implants   vascular stents  AAA graft  NO--Other_____    -PT DENIES ANY RASHES, ABRASION, OR OPEN WOUNDS     -Pt denies any suicidal ideation or thoughts.  Pt states not a threat to self or others.  DENIES DOMESTIC VIOLENCE    RCRI 0     CLASS RISK I     3.9    Duke Activity Status Index (DASI) from [employer]  on 10/31/2023  ** All calculations should be rechecked by clinician prior to use **    RESULT SUMMARY:  38.2 points  The higher the score (maximum 58.2), the higher the functional status.    7.44 METs        INPUTS:  Take care of self —> 2.75 = Yes  Walk indoors —> 1.75 = Yes  Walk 1&ndash;2 blocks on level ground —> 2.75 = Yes  Climb a flight of stairs or walk up a hill —> 5.5 = Yes  Run a short distance —> 8 = Yes  Do light work around the house —> 2.7 = Yes  Do moderate work around the house —> 3.5 = Yes  Do heavy work around the house —> 0 = No  Do yardwork —> 0 = No  Have sexual relations —> 5.25 = Yes  Participate in moderate recreational activities —> 6 = Yes  Participate in strenuous sports —> 0 = No    AS PER THE PT, THIS IS HIS/HER COMPLETE MEDICAL AND SURGICAL HX, INCLUDING MEDICATIONS PRESCRIBED AND OVER THE COUNTER    Patient verbalized understanding of instructions and was given the opportunity to ask questions and have them answered.   "I have a lesion on my right hip bone - I've had it for awhile but now it's gotten bigger"    PATIENT CURRENTLY DENIES CHEST PAIN    PALPITATIONS,  DYSURIA, OR URI WITHIN THE IN PAST 2 WEEKS    -Den[place] travel outside the [place] in the past 30 days  -Patient denies any signs or symptoms of COVID 19 and denies contact with known positive individuals.    -Patient was instructed to quarantine pre-procedure, practice exposure control measures, continue to self-monitor and report any concerns to their proceduralist.  -Pt advised self quarantine till day of procedure    ANESTHESIA ALERT  yes--Difficult Airway  short thick neck  NO--History of neck surgery or radiation  NO--Limited ROM of neck  NO--History of Malignant hyperthermia  NO--No personal or family history of Pseudocholinesterase deficiency.  NO--Prior Anesthesia Complication  NO--Latex Allergy  NO--Loose teeth  NO--History of Rheumatoid Arthritis  NO--Bleeding risk  NO--BARRERA  NO--Implants   vascular stents  AAA graft  NO--Other_____    -PT DENIES ANY RASHES, ABRASION, OR OPEN WOUNDS     -Pt denies any suicidal ideation or thoughts.  Pt states not a threat to self or others.  DENIES DOMESTIC VIOLENCE    RCRI 0     CLASS RISK I     3.9    Duke Activity Status Index (DASI) from [employer]  on 10/31/2023  ** All calculations should be rechecked by clinician prior to use **    RESULT SUMMARY:  38.2 points  The higher the score (maximum 58.2), the higher the functional status.    7.44 METs        INPUTS:  Take care of self —> 2.75 = Yes  Walk indoors —> 1.75 = Yes  Walk 1&ndash;2 blocks on level ground —> 2.75 = Yes  Climb a flight of stairs or walk up a hill —> 5.5 = Yes  Run a short distance —> 8 = Yes  Do light work around the house —> 2.7 = Yes  Do moderate work around the house —> 3.5 = Yes  Do heavy work around the house —> 0 = No  Do yardwork —> 0 = No  Have sexual relations —> 5.25 = Yes  Participate in moderate recreational activities —> 6 = Yes  Participate in strenuous sports —> 0 = No    AS PER THE PT, THIS IS HIS/HER COMPLETE MEDICAL AND SURGICAL HX, INCLUDING MEDICATIONS PRESCRIBED AND OVER THE COUNTER    Patient verbalized understanding of instructions and was given the opportunity to ask questions and have them answered.  PT WAS ADVISED TO CONTACT CARDIO RE PLAVIX AND VESBECCAA

## 2023-10-31 NOTE — H&P PST ADULT - NSICDXPASTMEDICALHX_GEN_ALL_CORE_FT
PAST MEDICAL HISTORY:  Aortic aneurysm     Basilar artery stenosis     CAD (coronary artery disease)     CVA (cerebral vascular accident)     Diabetes     High triglycerides     HTN (hypertension)     Lumbar radiculopathy     Skull fracture     Thyroid disease

## 2023-11-01 DIAGNOSIS — M89.9 DISORDER OF BONE, UNSPECIFIED: ICD-10-CM

## 2023-11-01 DIAGNOSIS — Z01.818 ENCOUNTER FOR OTHER PREPROCEDURAL EXAMINATION: ICD-10-CM

## 2023-11-02 ENCOUNTER — APPOINTMENT (OUTPATIENT)
Dept: CARDIOLOGY | Facility: CLINIC | Age: 63
End: 2023-11-02
Payer: MEDICARE

## 2023-11-02 PROCEDURE — 99441: CPT

## 2023-11-29 ENCOUNTER — RX RENEWAL (OUTPATIENT)
Age: 63
End: 2023-11-29

## 2023-11-29 RX ORDER — CLOPIDOGREL BISULFATE 75 MG/1
75 TABLET, FILM COATED ORAL DAILY
Qty: 90 | Refills: 3 | Status: ACTIVE | COMMUNITY
Start: 2022-09-30 | End: 1900-01-01

## 2023-12-01 PROBLEM — I65.1 OCCLUSION AND STENOSIS OF BASILAR ARTERY: Chronic | Status: ACTIVE | Noted: 2023-10-31

## 2023-12-01 PROBLEM — E78.1 PURE HYPERGLYCERIDEMIA: Chronic | Status: ACTIVE | Noted: 2023-10-31

## 2023-12-01 PROBLEM — M54.16 RADICULOPATHY, LUMBAR REGION: Chronic | Status: ACTIVE | Noted: 2023-10-31

## 2023-12-06 ENCOUNTER — OUTPATIENT (OUTPATIENT)
Dept: OUTPATIENT SERVICES | Facility: HOSPITAL | Age: 63
LOS: 1 days | End: 2023-12-06
Payer: MEDICARE

## 2023-12-06 VITALS
DIASTOLIC BLOOD PRESSURE: 93 MMHG | RESPIRATION RATE: 16 BRPM | HEART RATE: 90 BPM | WEIGHT: 298.06 LBS | SYSTOLIC BLOOD PRESSURE: 147 MMHG | TEMPERATURE: 98 F | HEIGHT: 74 IN | OXYGEN SATURATION: 99 %

## 2023-12-06 DIAGNOSIS — Z98.890 OTHER SPECIFIED POSTPROCEDURAL STATES: Chronic | ICD-10-CM

## 2023-12-06 DIAGNOSIS — M89.9 DISORDER OF BONE, UNSPECIFIED: ICD-10-CM

## 2023-12-06 DIAGNOSIS — Z95.828 PRESENCE OF OTHER VASCULAR IMPLANTS AND GRAFTS: Chronic | ICD-10-CM

## 2023-12-06 DIAGNOSIS — Z01.818 ENCOUNTER FOR OTHER PREPROCEDURAL EXAMINATION: ICD-10-CM

## 2023-12-06 DIAGNOSIS — Z95.5 PRESENCE OF CORONARY ANGIOPLASTY IMPLANT AND GRAFT: Chronic | ICD-10-CM

## 2023-12-06 DIAGNOSIS — Z90.49 ACQUIRED ABSENCE OF OTHER SPECIFIED PARTS OF DIGESTIVE TRACT: Chronic | ICD-10-CM

## 2023-12-06 PROCEDURE — 99214 OFFICE O/P EST MOD 30 MIN: CPT | Mod: 25

## 2023-12-06 NOTE — H&P PST ADULT - HISTORY OF PRESENT ILLNESS
Patient is a 63  year old  male presenting to PAST in preparation for Iliac biopsy   on 12/12  under anesthesia by   .  "I have a lesion on my right hip bone - I've had it for awhile but now it's gotten bigger"    PATIENT CURRENTLY DENIES CHEST PAIN    PALPITATIONS,  DYSURIA, OR URI WITHIN THE IN PAST 2 WEEKS    -Den[place] travel outside the [place] in the past 30 days  -Patient denies any signs or symptoms of COVID 19 and denies contact with known positive individuals.    -Patient was instructed to quarantine pre-procedure, practice exposure control measures, continue to self-monitor and report any concerns to their proceduralist.  -Pt advised self quarantine till day of procedure    ANESTHESIA ALERT  yes--Difficult Airway  short thick neck  NO--History of neck surgery or radiation  NO--Limited ROM of neck  NO--History of Malignant hyperthermia  NO--No personal or family history of Pseudocholinesterase deficiency.  NO--Prior Anesthesia Complication  NO--Latex Allergy  NO--Loose teeth  NO--History of Rheumatoid Arthritis  NO--Bleeding risk  NO--BARRERA  NO--Implants   vascular stents  AAA graft  NO--Other_____    -PT DENIES ANY RASHES, ABRASION, OR OPEN WOUNDS     -Pt denies any suicidal ideation or thoughts.  Pt states not a threat to self or others.  DENIES DOMESTIC VIOLENCE    RCRI 0     CLASS RISK I     3.9    Duke Activity Status Index (DASI) from [employer]  on 12/6/2023  ** All calculations should be rechecked by clinician prior to use **    RESULT SUMMARY:  58.2 points  The higher the score (maximum 58.2), the higher the functional status.    9.89 METs        INPUTS:  Take care of self —> 2.75 = Yes  Walk indoors —> 1.75 = Yes  Walk 1&ndash;2 blocks on level ground —> 2.75 = Yes  Climb a flight of stairs or walk up a hill —> 5.5 = Yes  Run a short distance —> 8 = Yes  Do light work around the house —> 2.7 = Yes  Do moderate work around the house —> 3.5 = Yes  Do heavy work around the house —> 8 = Yes  Do yardwork —> 4.5 = Yes  Have sexual relations —> 5.25 = Yes  Participate in moderate recreational activities —> 6 = Yes  Participate in strenuous sports —> 7.5 = Yes      AS PER THE PT, THIS IS HIS/HER COMPLETE MEDICAL AND SURGICAL HX, INCLUDING MEDICATIONS PRESCRIBED AND OVER THE COUNTER    Patient verbalized understanding of instructions and was given the opportunity to ask questions and have them answered.      Patient is a 63  year old  male presenting to PAST in preparation for Iliac biopsy   on 12/12  under anesthesia by   .  "I have a lesion on my right hip bone - I've had it for awhile but now it's gotten bigger"    PATIENT CURRENTLY DENIES CHEST PAIN    PALPITATIONS,  DYSURIA, OR URI WITHIN THE IN PAST 2 WEEKS    -Denies travel outside the [place] in the past 30 days  -Patient denies any signs or symptoms of COVID 19 and denies contact with known positive individuals.    -Patient was instructed to quarantine pre-procedure, practice exposure control measures, continue to self-monitor and report any concerns to their proceduralist.  -Pt advised self quarantine till day of procedure    ANESTHESIA ALERT  yes--Difficult Airway  short thick neck  NO--History of neck surgery or radiation  NO--Limited ROM of neck  NO--History of Malignant hyperthermia  NO--No personal or family history of Pseudocholinesterase deficiency.  NO--Prior Anesthesia Complication  NO--Latex Allergy  NO--Loose teeth  NO--History of Rheumatoid Arthritis  NO--Bleeding risk  NO--BARRERA  NO--Implants   vascular stents  AAA graft  NO--Other_____    -PT DENIES ANY RASHES, ABRASION, OR OPEN WOUNDS     -Pt denies any suicidal ideation or thoughts.  Pt states not a threat to self or others.  DENIES DOMESTIC VIOLENCE    RCRI 0     CLASS RISK I     3.9    Duke Activity Status Index (DASI) from [employer]  on 12/6/2023  ** All calculations should be rechecked by clinician prior to use **    RESULT SUMMARY:  58.2 points  The higher the score (maximum 58.2), the higher the functional status.    9.89 METs        INPUTS:  Take care of self —> 2.75 = Yes  Walk indoors —> 1.75 = Yes  Walk 1&ndash;2 blocks on level ground —> 2.75 = Yes  Climb a flight of stairs or walk up a hill —> 5.5 = Yes  Run a short distance —> 8 = Yes  Do light work around the house —> 2.7 = Yes  Do moderate work around the house —> 3.5 = Yes  Do heavy work around the house —> 8 = Yes  Do yardwork —> 4.5 = Yes  Have sexual relations —> 5.25 = Yes  Participate in moderate recreational activities —> 6 = Yes  Participate in strenuous sports —> 7.5 = Yes      AS PER THE PT, THIS IS HIS/HER COMPLETE MEDICAL AND SURGICAL HX, INCLUDING MEDICATIONS PRESCRIBED AND OVER THE COUNTER    Patient verbalized understanding of instructions and was given the opportunity to ask questions and have them answered.

## 2023-12-06 NOTE — H&P PST ADULT - NSANTHOSAYNRD_GEN_A_CORE
No. BARRERA screening performed.  STOP BANG Legend: 0-2 = LOW Risk; 3-4 = INTERMEDIATE Risk; 5-8 = HIGH Risk Topical Clindamycin Counseling: Patient counseled that this medication may cause skin irritation or allergic reactions.  In the event of skin irritation, the patient was advised to reduce the amount of the drug applied or use it less frequently.   The patient verbalized understanding of the proper use and possible adverse effects of clindamycin.  All of the patient's questions and concerns were addressed.

## 2023-12-06 NOTE — H&P PST ADULT - NSICDXPASTMEDICALHX_GEN_ALL_CORE_FT
PAST MEDICAL HISTORY:  Aortic aneurysm     Basilar artery stenosis     CAD (coronary artery disease)     CVA (cerebral vascular accident)     Diabetes     High triglycerides     HTN (hypertension)     Lumbar radiculopathy     Obesity (BMI 30-39.9)     Skull fracture     Thyroid disease

## 2023-12-07 ENCOUNTER — APPOINTMENT (OUTPATIENT)
Dept: ENDOCRINOLOGY | Facility: CLINIC | Age: 63
End: 2023-12-07
Payer: MEDICARE

## 2023-12-07 VITALS
OXYGEN SATURATION: 98 % | BODY MASS INDEX: 38.12 KG/M2 | SYSTOLIC BLOOD PRESSURE: 132 MMHG | HEART RATE: 80 BPM | HEIGHT: 74 IN | DIASTOLIC BLOOD PRESSURE: 88 MMHG | WEIGHT: 297 LBS

## 2023-12-07 DIAGNOSIS — E78.5 HYPERLIPIDEMIA, UNSPECIFIED: ICD-10-CM

## 2023-12-07 DIAGNOSIS — M89.9 DISORDER OF BONE, UNSPECIFIED: ICD-10-CM

## 2023-12-07 DIAGNOSIS — Z01.818 ENCOUNTER FOR OTHER PREPROCEDURAL EXAMINATION: ICD-10-CM

## 2023-12-07 PROCEDURE — 99213 OFFICE O/P EST LOW 20 MIN: CPT

## 2023-12-07 RX ORDER — TIRZEPATIDE 10 MG/.5ML
10 INJECTION, SOLUTION SUBCUTANEOUS
Qty: 3 | Refills: 1 | Status: DISCONTINUED | COMMUNITY
Start: 2023-06-05 | End: 2023-12-07

## 2023-12-14 ENCOUNTER — APPOINTMENT (OUTPATIENT)
Dept: CARDIOLOGY | Facility: CLINIC | Age: 63
End: 2023-12-14
Payer: MEDICARE

## 2023-12-14 VITALS
HEIGHT: 74 IN | WEIGHT: 298 LBS | BODY MASS INDEX: 38.24 KG/M2 | DIASTOLIC BLOOD PRESSURE: 84 MMHG | SYSTOLIC BLOOD PRESSURE: 126 MMHG | HEART RATE: 85 BPM | OXYGEN SATURATION: 98 %

## 2023-12-14 DIAGNOSIS — I10 ESSENTIAL (PRIMARY) HYPERTENSION: ICD-10-CM

## 2023-12-14 DIAGNOSIS — I25.10 ATHEROSCLEROTIC HEART DISEASE OF NATIVE CORONARY ARTERY W/OUT ANGINA PECTORIS: ICD-10-CM

## 2023-12-14 PROBLEM — E66.9 OBESITY, UNSPECIFIED: Chronic | Status: ACTIVE | Noted: 2023-12-06

## 2023-12-14 PROCEDURE — 93000 ELECTROCARDIOGRAM COMPLETE: CPT

## 2023-12-14 PROCEDURE — 99214 OFFICE O/P EST MOD 30 MIN: CPT

## 2023-12-14 RX ORDER — GLIMEPIRIDE 2 MG/1
2 TABLET ORAL DAILY
Refills: 0 | Status: ACTIVE | COMMUNITY

## 2023-12-14 RX ORDER — ICOSAPENT ETHYL 1 G/1
1 CAPSULE ORAL
Qty: 360 | Refills: 0 | Status: DISCONTINUED | COMMUNITY
Start: 2022-08-30 | End: 2023-12-14

## 2023-12-14 RX ORDER — BLOOD-GLUCOSE TRANSMITTER
EACH MISCELLANEOUS
Qty: 1 | Refills: 3 | Status: DISCONTINUED | COMMUNITY
Start: 2022-11-17 | End: 2023-12-14

## 2023-12-14 RX ORDER — ICOSAPENT ETHYL 1 G/1
1 CAPSULE ORAL
Qty: 360 | Refills: 3 | Status: ACTIVE | COMMUNITY
Start: 2023-12-14 | End: 1900-01-01

## 2023-12-14 NOTE — HISTORY OF PRESENT ILLNESS
[FreeTextEntry1] : 63M with CAD s/p RCA PCI, AAA s/p endovascular repair , HTN, T2DM, CVA, DLD, basilar artery stenosis here for followup of CAD, HTN, DLD.   23 Pt is seen for f.u.  Pt will have R hip biopsy 23 for a bone lesion.   Pt denies chest pain, no SOB, no palpitations, no dizziness.  Followed by Dr Muro for S/p AAA endovascular repair.  Pt is active all day.   6/15/23 Pt is seen for f/u.  Denies SOB, no chest pain, no dizziness, no BLEs edema.  Pt tolerates Plavix well, no s.s of bleeding.  Pt c.o knee pain with walking.  Ozempic was changed to Mounjaro inj.  Pt did not start exercise program.  Can walk >4 blocks and climb> 2 flights of stairs with no CP or SOB.   CTA next week for AAA  22 Pt is seen for f.u.  Denies SOB, no chest pain, no dizziness, no edema.  Brilinta/ ASA was D/brisa by Dr Esqueda pt was started on plavix tolerating it well.  Pt is mostly sedentary  22- PVR R  anterior Tibial artery disease  L distal sup Femoral artery and /or popiteal and L ant tibial arterial disease.   Blood work done today in am  22  Abdominal Aorta AAA 4.5 cm   22 No further episodes of chest pain. No shortness of breath, palpitations, lightheadedness/dizziness, pre-syncope/syncope, or lower extremity edema.   Rhinorrhea 3-4 times per day. Sneezing. No congestion.   Continues to struggle with eating. Starting to make lifestyle changes.   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  22 Working to lose weight. Lost 17 lbs. Reading labels. Avoiding added sugar. Weight loss has slowed because of recent sciatica flare.   Had recent chest pain episode after touring [facility] with his daughter. They had walked most of the day and at the end of the day they were going to the bookstore when we had chest pain and diaphoresis. No associated SOB, dizziness. Scared him. Improved after 7-8 minutes of rest. No recurrence.   22  No chest pain, SOB, palpitations, LE edema.   AAA incidentally found 20 years ago. Previously following with Dr. Raygoza.   Joined [facility] last month. Trying to lose weight. Resistance training (>150 lbs) yesterday.   Quit smoking   No known family hxo aortic aneurysm, however grandfather  suddenly at 63 yo. Diagnosis unknown  Single dad to 16 yo and 15 yo  Increased stress raising kids by himself. States he is high functioning, but depressed.

## 2023-12-14 NOTE — ASSESSMENT
[FreeTextEntry1] : Assessment: #CAD s/p RCA PCI 12/2018 - asymptomatic  #AAA s/p EVAR 8/6/23  #Basilar artery stenosis and PCA stenosis - Medical management recommended #Hxo carotid stenosis #HTN - Goal SBP<110 with AAA, however his goal is SBP 120s given BA and PCA stenosis #DLD - 7/22/22 , , HDL 37, LDL 70, AST 17, ALT 21 on atorva 80 - TG not at goal #BMI 37 #T2DM, uncontrolled - 7/22/22 Hgb A1c 8.5% #PAD  07/14/23 TTE EF 55% Fibrocalcific AV Mildly dilatated prox ascending Aorta 3.7 cm   6/9/23  , , HDL 31, LDL 50 K 4.7, Cr 1.5 A1c 8.7%  Plan: - Cont Plavix - Cont Vascepa for hypertriglyceridemia - Cont atorvastatin 80 mg qHS - Continue ramipril 10 mg BID and diltiazem 360 mg daily - Encouraged plant-based and Mediterranean diets, along with increased fruit, nut, vegetable, legume, and lean vegetable or animal protein (preferably fish) consumption - Engage in at least 150 minutes per week of accumulated moderate-intensity aerobic physical activity or 75 minutes per week of vigorous-intensity aerobic physical activity - Labs planned with endo before next visit - RTC 6 months

## 2023-12-14 NOTE — CARDIOLOGY SUMMARY
[de-identified] : EKG 8/29/22 Normal sinus rhythm 94 bpm EKG 5/9/22 Normal sinus rhythm 87 bpm EKG 6/15/23 Normal sinus rhythm 93 bpm  EKG 12/14/23 NSR 85 bpm  [de-identified] : TTE 7/12/23 normal LV sys function, EF 55%, normal RV size/function, no HD significant valve disease ascending aorta 3.7 cm [de-identified] : Pharmacologic nuclear stress test 7/5/22 negative, no perfusion defects [de-identified] : 09/28/22- PVR R  anterior Tibial artery disease  L distal sup Femoral artery and /or popiteal and L ant tibial arterial disease.   Blood \par  LE ART US 9/28/22 mild to moder diffuse athero b/l, no pop art aneurysm\par  11/23/22 US Abdominal Aorta AAA 4.5 cm

## 2023-12-20 ENCOUNTER — OUTPATIENT (OUTPATIENT)
Dept: OUTPATIENT SERVICES | Facility: HOSPITAL | Age: 63
LOS: 1 days | Discharge: HOME | End: 2023-12-20
Payer: MEDICARE

## 2023-12-20 ENCOUNTER — RESULT REVIEW (OUTPATIENT)
Age: 63
End: 2023-12-20

## 2023-12-20 ENCOUNTER — TRANSCRIPTION ENCOUNTER (OUTPATIENT)
Age: 63
End: 2023-12-20

## 2023-12-20 VITALS
SYSTOLIC BLOOD PRESSURE: 151 MMHG | HEART RATE: 85 BPM | TEMPERATURE: 97 F | WEIGHT: 298.06 LBS | DIASTOLIC BLOOD PRESSURE: 99 MMHG | HEIGHT: 74 IN | RESPIRATION RATE: 18 BRPM | OXYGEN SATURATION: 97 %

## 2023-12-20 VITALS
DIASTOLIC BLOOD PRESSURE: 90 MMHG | OXYGEN SATURATION: 96 % | HEART RATE: 82 BPM | RESPIRATION RATE: 20 BRPM | SYSTOLIC BLOOD PRESSURE: 149 MMHG

## 2023-12-20 DIAGNOSIS — Z95.5 PRESENCE OF CORONARY ANGIOPLASTY IMPLANT AND GRAFT: Chronic | ICD-10-CM

## 2023-12-20 DIAGNOSIS — Z90.49 ACQUIRED ABSENCE OF OTHER SPECIFIED PARTS OF DIGESTIVE TRACT: Chronic | ICD-10-CM

## 2023-12-20 DIAGNOSIS — M89.9 DISORDER OF BONE, UNSPECIFIED: ICD-10-CM

## 2023-12-20 DIAGNOSIS — Z95.828 PRESENCE OF OTHER VASCULAR IMPLANTS AND GRAFTS: Chronic | ICD-10-CM

## 2023-12-20 DIAGNOSIS — Z98.890 OTHER SPECIFIED POSTPROCEDURAL STATES: Chronic | ICD-10-CM

## 2023-12-20 LAB
BASE EXCESS BLDV CALC-SCNC: -2 MMOL/L — SIGNIFICANT CHANGE UP (ref -2–3)
BASE EXCESS BLDV CALC-SCNC: -2 MMOL/L — SIGNIFICANT CHANGE UP (ref -2–3)
CA-I SERPL-SCNC: 1.16 MMOL/L — SIGNIFICANT CHANGE UP (ref 1.15–1.33)
CA-I SERPL-SCNC: 1.16 MMOL/L — SIGNIFICANT CHANGE UP (ref 1.15–1.33)
GAS PNL BLDV: 132 MMOL/L — LOW (ref 136–145)
GAS PNL BLDV: 132 MMOL/L — LOW (ref 136–145)
GAS PNL BLDV: SIGNIFICANT CHANGE UP
GAS PNL BLDV: SIGNIFICANT CHANGE UP
HCO3 BLDV-SCNC: 23 MMOL/L — SIGNIFICANT CHANGE UP (ref 22–29)
HCO3 BLDV-SCNC: 23 MMOL/L — SIGNIFICANT CHANGE UP (ref 22–29)
HCT VFR BLDA CALC: 54 % — HIGH (ref 39–51)
HCT VFR BLDA CALC: 54 % — HIGH (ref 39–51)
HGB BLD CALC-MCNC: 18.1 G/DL — HIGH (ref 12.6–17.4)
HGB BLD CALC-MCNC: 18.1 G/DL — HIGH (ref 12.6–17.4)
LACTATE BLDV-MCNC: 2.4 MMOL/L — HIGH (ref 0.5–2)
LACTATE BLDV-MCNC: 2.4 MMOL/L — HIGH (ref 0.5–2)
PCO2 BLDV: 40 MMHG — LOW (ref 42–55)
PCO2 BLDV: 40 MMHG — LOW (ref 42–55)
PH BLDV: 7.37 — SIGNIFICANT CHANGE UP (ref 7.32–7.43)
PH BLDV: 7.37 — SIGNIFICANT CHANGE UP (ref 7.32–7.43)
PO2 BLDV: 62 MMHG — HIGH (ref 25–45)
PO2 BLDV: 62 MMHG — HIGH (ref 25–45)
POTASSIUM BLDV-SCNC: 4.1 MMOL/L — SIGNIFICANT CHANGE UP (ref 3.5–5.1)
POTASSIUM BLDV-SCNC: 4.1 MMOL/L — SIGNIFICANT CHANGE UP (ref 3.5–5.1)
SAO2 % BLDV: 92 % — HIGH (ref 67–88)
SAO2 % BLDV: 92 % — HIGH (ref 67–88)

## 2023-12-20 PROCEDURE — 88313 SPECIAL STAINS GROUP 2: CPT

## 2023-12-20 PROCEDURE — 88173 CYTOPATH EVAL FNA REPORT: CPT

## 2023-12-20 PROCEDURE — 88305 TISSUE EXAM BY PATHOLOGIST: CPT | Mod: 26

## 2023-12-20 PROCEDURE — 88172 CYTP DX EVAL FNA 1ST EA SITE: CPT

## 2023-12-20 PROCEDURE — 88342 IMHCHEM/IMCYTCHM 1ST ANTB: CPT | Mod: 26

## 2023-12-20 PROCEDURE — 88342 IMHCHEM/IMCYTCHM 1ST ANTB: CPT

## 2023-12-20 PROCEDURE — 77012 CT SCAN FOR NEEDLE BIOPSY: CPT | Mod: 26

## 2023-12-20 PROCEDURE — 82803 BLOOD GASES ANY COMBINATION: CPT

## 2023-12-20 PROCEDURE — 88173 CYTOPATH EVAL FNA REPORT: CPT | Mod: 26

## 2023-12-20 PROCEDURE — 88177 CYTP FNA EVAL EA ADDL: CPT

## 2023-12-20 PROCEDURE — 84295 ASSAY OF SERUM SODIUM: CPT

## 2023-12-20 PROCEDURE — 88177 CYTP FNA EVAL EA ADDL: CPT | Mod: 26

## 2023-12-20 PROCEDURE — 88313 SPECIAL STAINS GROUP 2: CPT | Mod: 26

## 2023-12-20 PROCEDURE — 84132 ASSAY OF SERUM POTASSIUM: CPT

## 2023-12-20 PROCEDURE — 85014 HEMATOCRIT: CPT

## 2023-12-20 PROCEDURE — 77012 CT SCAN FOR NEEDLE BIOPSY: CPT

## 2023-12-20 PROCEDURE — 88305 TISSUE EXAM BY PATHOLOGIST: CPT

## 2023-12-20 PROCEDURE — 85018 HEMOGLOBIN: CPT

## 2023-12-20 PROCEDURE — 82330 ASSAY OF CALCIUM: CPT

## 2023-12-20 PROCEDURE — 20220 BONE BIOPSY TROCAR/NDL SUPFC: CPT

## 2023-12-20 PROCEDURE — 88172 CYTP DX EVAL FNA 1ST EA SITE: CPT | Mod: 26

## 2023-12-20 PROCEDURE — 83605 ASSAY OF LACTIC ACID: CPT

## 2023-12-20 RX ORDER — TIRZEPATIDE 15 MG/.5ML
12.5 INJECTION, SOLUTION SUBCUTANEOUS
Refills: 0 | DISCHARGE

## 2023-12-20 RX ORDER — RAMIPRIL 5 MG
1 CAPSULE ORAL
Refills: 0 | DISCHARGE

## 2023-12-20 RX ORDER — GLIMEPIRIDE 1 MG
1 TABLET ORAL
Refills: 0 | DISCHARGE

## 2023-12-20 RX ORDER — DILTIAZEM HCL 120 MG
1 CAPSULE, EXT RELEASE 24 HR ORAL
Qty: 0 | Refills: 0 | DISCHARGE

## 2023-12-20 RX ORDER — METFORMIN HYDROCHLORIDE 850 MG/1
1 TABLET ORAL
Refills: 0 | DISCHARGE

## 2023-12-20 RX ORDER — ACETAMINOPHEN 500 MG
650 TABLET ORAL ONCE
Refills: 0 | Status: COMPLETED | OUTPATIENT
Start: 2023-12-20 | End: 2023-12-20

## 2023-12-20 RX ORDER — CLOPIDOGREL BISULFATE 75 MG/1
1 TABLET, FILM COATED ORAL
Refills: 0 | DISCHARGE

## 2023-12-20 RX ORDER — LEVOTHYROXINE SODIUM 125 MCG
1 TABLET ORAL
Refills: 0 | DISCHARGE

## 2023-12-20 RX ORDER — ICOSAPENT ETHYL 500 MG/1
2 CAPSULE, LIQUID FILLED ORAL
Refills: 0 | DISCHARGE

## 2023-12-20 RX ADMIN — Medication 650 MILLIGRAM(S): at 13:49

## 2023-12-20 NOTE — PRE-ANESTHESIA EVALUATION ADULT - NSDENTALSD_ENT_ALL_CORE
front upper teeth - caps/implant, missing multiple teeth/appears normal and intact/caps/bridge/implants/missing teeth

## 2023-12-20 NOTE — ASU DISCHARGE PLAN (ADULT/PEDIATRIC) - NS MD DC FALL RISK RISK
For information on Fall & Injury Prevention, visit: [email or web address] OR  [email or web address] OR  https://www.cdc.gov/steadi/patient.html For information on Fall & Injury Prevention, visit: [email or web address]://[email or web address] OR  [email or web address]-[email or web address]-injury OR  https://www.cdc.gov/steadi/patient.html

## 2023-12-20 NOTE — PRE-ANESTHESIA EVALUATION ADULT - SPO2 (%)
EXAM DESCRIPTION:  CHEST PA/LAT



COMPLETED DATE/TIME:  11/11/2017 2:31 am



REASON FOR STUDY:  cough



COMPARISON:  None.



EXAM PARAMETERS:  NUMBER OF VIEWS: two views

TECHNIQUE: Digital Frontal and Lateral radiographic views of the chest acquired.

RADIATION DOSE: NA

LIMITATIONS: none



FINDINGS:  LUNGS AND PLEURA: Moderate peribronchial cuffing and mild hyperinflation.

MEDIASTINUM AND HILAR STRUCTURES: No masses or contour abnormalities.

HEART AND VASCULAR STRUCTURES: Heart normal size.  No evidence for failure.

BONES: No acute findings.

HARDWARE: None in the chest.

OTHER: No other significant finding.



IMPRESSION:  With moderate viral bronchiolitis with possible reactive airway disease.



TECHNICAL DOCUMENTATION:  JOB ID:  [id number]

 2011 [employer]- All Rights Reserved
97

## 2023-12-20 NOTE — PRE-ANESTHESIA EVALUATION ADULT - NSANTHPMHFT_GEN_ALL_CORE
CVA in 2018 2/2 basilar artery stenosis, no residuals.  h/o coronary stent in 2018. >4 METS, no CP/SOB  s/p EVAR for AAA in 8/2023  former smoker  unknown childhood reaction to PCN

## 2023-12-20 NOTE — PROGRESS NOTE ADULT - SUBJECTIVE AND OBJECTIVE BOX
INTERVENTIONAL RADIOLOGY BRIEF-OPERATIVE NOTE    Procedure:  Percutaneous, CT-directed core needle biopsy of sclerotic right iliac bone lesion    Pre-Op Diagnosis:  Sclerotic right iliac bone lesion    Post-Op Diagnosis:  Same    Attending:  Hadley (IR) / Fred (Anaesthesia)  Resident:  None    Anesthesia (type):  [ ] General Anesthesia  [X] Sedation-- see anaesthesia record  [ ] Spinal Anesthesia  [X] Local/Regional-- 1% Lidocaine, SQ    Total Face-to-Face Sedation Time:  56 minutes    Contrast:  None    Estimated Blood Loss:  10 cc    Condition:   [ ] Critical  [ ] Serious  [ ] Fair   [X] Good    Findings/Follow up Plan of Care:  2.1 x 1.3 x 2.0 cm right iliac bone sclerotic lesion biopsied with 10/12G, 15/19 cm QUIQ bone biopsy system using CT-guidance.  Multiple 12G tissue cores obtained from the sclerotic portion; aspiration was attempted but not possible.  Cores also obtained subjacent to the lesion, in addition to 4 cc of marrow aspirate.  Patient tolerated very well, without incident.     Specimens Removed:  As above    Implants:  None    Complications:  None immediate    Disposition:  Discharge home and f/u with Dr. Rogel.      Please call Interventional Radiology [phone] with any questions, concerns, or issues.         INTERVENTIONAL RADIOLOGY BRIEF-OPERATIVE NOTE    Procedure:  Percutaneous, CT-directed core needle biopsy of sclerotic right iliac bone lesion    Pre-Op Diagnosis:  Sclerotic right iliac bone lesion    Post-Op Diagnosis:  Same    Attending:  Hadley (IR) / Fred (Anaesthesia)  Resident:  None    Anesthesia (type):  [ ] General Anesthesia  [X] Sedation-- see anaesthesia record  [ ] Spinal Anesthesia  [X] Local/Regional-- 1% Lidocaine, SQ    Total Face-to-Face Sedation Time:  56 minutes    Contrast:  None    Estimated Blood Loss:  10 cc    Condition:   [ ] Critical  [ ] Serious  [ ] Fair   [X] Good    Findings/Follow up Plan of Care:  2.1 x 1.3 x 2.0 cm right iliac bone sclerotic lesion biopsied with 10/12G, 15/19 cm [employer] bone biopsy system using CT-guidance.  Multiple 12G tissue cores obtained from the sclerotic portion; aspiration was attempted but not possible.  Cores also obtained subjacent to the lesion, in addition to 4 cc of marrow aspirate.  Patient tolerated very well, without incident.     Specimens Removed:  As above    Implants:  None    Complications:  None immediate    Disposition:  Discharge home and f/u with Dr. Rogel.      Please call Interventional Radiology [phone] with any questions, concerns, or issues.

## 2023-12-20 NOTE — PROGRESS NOTE ADULT - SUBJECTIVE AND OBJECTIVE BOX
PREOPERATIVE DAY OF PROCEDURE EVALUATION:     I have personally seen and examined this patient. I agree with the history and physical which I have reviewed and noted any changes below:     Plan is for image guided right iliac wing lesion biopsy with conscious sedation / anesthesia today 12/20  H+P from PST 12/6 - no changes  Plavix 75mg - stopped 5 days ago    Procedure/ risks/ benefits/ goals/ alternatives were explained. All questions answered. Informed content obtained from patient. Consent placed in chart.

## 2023-12-20 NOTE — ASU PATIENT PROFILE, ADULT - FALL HARM RISK - UNIVERSAL INTERVENTIONS
Bed in lowest position, wheels locked, appropriate side rails in place/Call bell, personal items and telephone in reach/Instruct patient to call for assistance before getting out of bed or chair/Non-slip footwear when patient is out of bed/[place] to call system/Physically safe environment - no spills, clutter or unnecessary equipment/Purposeful Proactive Rounding/Room/bathroom lighting operational, light cord in reach Bed in lowest position, wheels locked, appropriate side rails in place/Call bell, personal items and telephone in reach/Instruct patient to call for assistance before getting out of bed or chair/Non-slip footwear when patient is out of bed/[place] to call system/Physically safe environment - no spills, clutter or unnecessary equipment/Purposeful Proactive Rounding/Room/bathroom lighting operational, light cord in reach

## 2023-12-22 DIAGNOSIS — M89.9 DISORDER OF BONE, UNSPECIFIED: ICD-10-CM

## 2023-12-27 LAB
NON-GYNECOLOGICAL CYTOLOGY STUDY: SIGNIFICANT CHANGE UP
NON-GYNECOLOGICAL CYTOLOGY STUDY: SIGNIFICANT CHANGE UP

## 2024-01-23 ENCOUNTER — RX RENEWAL (OUTPATIENT)
Age: 64
End: 2024-01-23

## 2024-02-23 ENCOUNTER — APPOINTMENT (OUTPATIENT)
Dept: VASCULAR SURGERY | Facility: CLINIC | Age: 64
End: 2024-02-23
Payer: MEDICARE

## 2024-02-23 VITALS — WEIGHT: 305 LBS | HEIGHT: 74 IN | BODY MASS INDEX: 39.14 KG/M2

## 2024-02-23 PROCEDURE — 99213 OFFICE O/P EST LOW 20 MIN: CPT

## 2024-02-23 PROCEDURE — 93978 VASCULAR STUDY: CPT

## 2024-02-23 NOTE — HISTORY OF PRESENT ILLNESS
[FreeTextEntry1] : 63 y/o M with 4.8 cm saccular AAA underwent EVAR on 8/10/23, presents for follow up. Denies any complaints.

## 2024-02-23 NOTE — ASSESSMENT
[FreeTextEntry1] : 65 y/o M with 4.8 cm saccular AAA underwent EVAR on 8/10/23, presents for follow up.  Aortic duplex showed patent endograft, endoleak not visualized, limited study due to abdominal girth, sac measured 6.2 cm.  I would like to obtain a CTA for further evaluation and to rule out endoleak. I will see him back after the CTA.  I spent 25 minutes with patient performing physical exam, reviewing duplex results and CTA findings, discussing treatment plan and follow up.   I, Dr. Muro, personally performed the evaluation and management (E/M) services for this established patient who presents today with (a) new problem(s)/exacerbation of (an) existing condition(s). That E/M includes conducting the clinically appropriate interval history &/or exam, assessing all new/exacerbated conditions, and establishing a new plan of care. Today, my DEE, Lena], was here to observe my evaluation and management service for this new problem/exacerbated condition and follow the plan of care established by me going forward.  Thank you for allowing me to participate in the care of your patient.   Sincerely,   Robin Muro MD, RPVI, FACS Associate Professor of Surgery , Vascular Fellowship Director of Limb Salvage Surgery [facility] at [facility]

## 2024-02-23 NOTE — DATA REVIEWED
[FreeTextEntry1] : I performed an aortic duplex which was medically necessary to evaluate for endoleak. It showed patent endograft, endoleak not visualized, limited study due to abdominal girth, sac measured 6.2 cm.

## 2024-02-26 ENCOUNTER — APPOINTMENT (OUTPATIENT)
Dept: ORTHOPEDIC SURGERY | Facility: CLINIC | Age: 64
End: 2024-02-26
Payer: MEDICARE

## 2024-02-26 ENCOUNTER — NON-APPOINTMENT (OUTPATIENT)
Age: 64
End: 2024-02-26

## 2024-02-26 VITALS — BODY MASS INDEX: 38.5 KG/M2 | HEIGHT: 74 IN | WEIGHT: 300 LBS

## 2024-02-26 DIAGNOSIS — M25.571 PAIN IN RIGHT ANKLE AND JOINTS OF RIGHT FOOT: ICD-10-CM

## 2024-02-26 DIAGNOSIS — M54.9 DORSALGIA, UNSPECIFIED: ICD-10-CM

## 2024-02-26 LAB
BUN SERPL-MCNC: 23 MG/DL
CREAT SERPL-MCNC: 1.4 MG/DL
EGFR: 57 ML/MIN/1.73M2

## 2024-02-26 PROCEDURE — 73610 X-RAY EXAM OF ANKLE: CPT | Mod: RT

## 2024-02-26 PROCEDURE — 99213 OFFICE O/P EST LOW 20 MIN: CPT

## 2024-02-26 PROCEDURE — 73630 X-RAY EXAM OF FOOT: CPT | Mod: RT

## 2024-02-26 NOTE — DATA REVIEWED
[FreeTextEntry1] : X-ray right foot/ankle: No acute fractures, subluxations, dislocations.  Noted degenerative changes.  Noted possible chronic fracture healed medial malleolus

## 2024-02-26 NOTE — HISTORY OF PRESENT ILLNESS
[de-identified] : Patient is a 64-year-old male here for evaluation of right foot/ankle pain.  Patient states 2 days ago he woke up with pain and swelling to his right foot/ankle with no injury or trauma.  Denies numbness/tingling.  Patient has pain with ambulation.  Patient is a diabetic and has history of gout to his left foot.  Patient has not had gout in years.

## 2024-02-26 NOTE — IMAGING
[de-identified] : Right foot/ankle exam: Noted mild diffuse swelling of foot and ankle with no erythema or sign of infection.  Tenderness to palpation to medial and lateral ankle joint, deltoid ligaments, lateral ligaments, diffuse tenderness to lateral aspect of foot.  Good range of motion of foot and ankle with pain, negative Dwyer's, neurovascular intact

## 2024-02-26 NOTE — DISCUSSION/SUMMARY
[de-identified] : Blood work ordered for further evaluation to rule out possible gout flareup.  Otherwise discussed with patient no acute fractures, subluxations, dislocations.  Discussed with patient that he can also be having a flareup of osteoarthritis.  Advised patient follow-up with primary physician ASAP for further evaluation and treatment of possible gout.  Patient is a diabetic states his A1c is in the high sevens.  Patient also has history of heart problems., advised to speak with his primary physician if he can take prescribed corticosteroids or NSAIDs.  Patient understood and agrees with plan.  Patient will call if symptoms worsen or change.

## 2024-03-21 ENCOUNTER — OUTPATIENT (OUTPATIENT)
Dept: OUTPATIENT SERVICES | Facility: HOSPITAL | Age: 64
LOS: 1 days | End: 2024-03-21
Payer: MEDICARE

## 2024-03-21 ENCOUNTER — RESULT REVIEW (OUTPATIENT)
Age: 64
End: 2024-03-21

## 2024-03-21 DIAGNOSIS — Z98.890 OTHER SPECIFIED POSTPROCEDURAL STATES: Chronic | ICD-10-CM

## 2024-03-21 DIAGNOSIS — Z95.5 PRESENCE OF CORONARY ANGIOPLASTY IMPLANT AND GRAFT: Chronic | ICD-10-CM

## 2024-03-21 DIAGNOSIS — Z95.828 PRESENCE OF OTHER VASCULAR IMPLANTS AND GRAFTS: Chronic | ICD-10-CM

## 2024-03-21 DIAGNOSIS — Z90.49 ACQUIRED ABSENCE OF OTHER SPECIFIED PARTS OF DIGESTIVE TRACT: Chronic | ICD-10-CM

## 2024-03-21 DIAGNOSIS — Z00.8 ENCOUNTER FOR OTHER GENERAL EXAMINATION: ICD-10-CM

## 2024-03-21 DIAGNOSIS — I71.40 ABDOMINAL AORTIC ANEURYSM, WITHOUT RUPTURE, UNSPECIFIED: ICD-10-CM

## 2024-03-21 PROCEDURE — 74174 CTA ABD&PLVS W/CONTRAST: CPT | Mod: 26

## 2024-03-21 PROCEDURE — 74174 CTA ABD&PLVS W/CONTRAST: CPT

## 2024-03-22 DIAGNOSIS — I71.40 ABDOMINAL AORTIC ANEURYSM, WITHOUT RUPTURE, UNSPECIFIED: ICD-10-CM

## 2024-04-03 ENCOUNTER — APPOINTMENT (OUTPATIENT)
Dept: VASCULAR SURGERY | Facility: CLINIC | Age: 64
End: 2024-04-03

## 2024-04-17 ENCOUNTER — APPOINTMENT (OUTPATIENT)
Dept: VASCULAR SURGERY | Facility: CLINIC | Age: 64
End: 2024-04-17
Payer: MEDICARE

## 2024-04-17 VITALS
HEIGHT: 74 IN | WEIGHT: 300 LBS | DIASTOLIC BLOOD PRESSURE: 91 MMHG | BODY MASS INDEX: 38.5 KG/M2 | HEART RATE: 111 BPM | SYSTOLIC BLOOD PRESSURE: 141 MMHG

## 2024-04-17 DIAGNOSIS — I71.40 ABDOMINAL AORTIC ANEURYSM, WITHOUT RUPTURE, UNSPECIFIED: ICD-10-CM

## 2024-04-17 PROCEDURE — 99213 OFFICE O/P EST LOW 20 MIN: CPT

## 2024-04-17 NOTE — ASSESSMENT
[FreeTextEntry1] : 65 y/o M with 4.8 cm saccular AAA underwent EVAR on 8/10/23, presents for follow up. Aortic duplex showed patent endograft, endoleak not visualized, limited study due to abdominal girth, sac measured 6.2 cm.  I had sent the patient for a CTA in which I reviewed that showed a 4.4 cm infrarenal abdominal attic aneurysm sac size with no evidence of endoleak.  I discussed these results with the patient and recommend routine follow-up in 6 months time or sooner if any new symptoms develop.  I will see him back in my office in 6 months for repeat aortic duplex exam.  I spent 25 minutes with patient performing physical exam, reviewing duplex results and CTA findings, discussing treatment plan and follow up.    I, Dr. Muro, personally performed the evaluation and management (E/M) services for this established patient who presents today with (a) new problem(s)/exacerbation of (an) existing condition(s). That E/M includes conducting the clinically appropriate interval history &/or exam, assessing all new/exacerbated conditions, and establishing a new plan of care. Today, my DEE, Shaniqua Lloyd was here to observe my evaluation and management service for this new problem/exacerbated condition and follow the plan of care established by me going forward.  Thank you for allowing me to participate in the care of your patient.   Sincerely,   Robin Muro MD, RPVI, FACS Associate Professor of Surgery , Vascular Fellowship Director of Limb Salvage Surgery [facility] at [facility]

## 2024-04-17 NOTE — HISTORY OF PRESENT ILLNESS
[FreeTextEntry1] : 63 y/o M with 4.8 cm saccular AAA underwent EVAR on 8/10/23, presents for follow up. Denies any complaints.  The patient recently had an aortic duplex in my office that shows an increase in sac size to 6.2 cm.  I sent the patient for a CTA of his abdomen and pelvis to evaluate his endovascular repair and sac size of endoleak.

## 2024-04-29 RX ORDER — SEMAGLUTIDE 2.68 MG/ML
8 INJECTION, SOLUTION SUBCUTANEOUS
Qty: 3 | Refills: 1 | Status: ACTIVE | COMMUNITY
Start: 2024-04-29 | End: 1900-01-01

## 2024-05-14 ENCOUNTER — APPOINTMENT (OUTPATIENT)
Dept: OTOLARYNGOLOGY | Facility: CLINIC | Age: 64
End: 2024-05-14
Payer: MEDICARE

## 2024-05-14 DIAGNOSIS — H93.13 TINNITUS, BILATERAL: ICD-10-CM

## 2024-05-14 DIAGNOSIS — H93.A1 PULSATILE TINNITUS, RIGHT EAR: ICD-10-CM

## 2024-05-14 PROCEDURE — 99204 OFFICE O/P NEW MOD 45 MIN: CPT

## 2024-05-14 PROCEDURE — 92550 TYMPANOMETRY & REFLEX THRESH: CPT | Mod: 52

## 2024-05-14 PROCEDURE — 92557 COMPREHENSIVE HEARING TEST: CPT

## 2024-05-14 NOTE — DATA REVIEWED
[de-identified] :  reviewed and interpreted by me. [facility] [de-identified] : Test was reviewed  and interpreted by me. See official report below.    EXAM:  MR ANGIO NECK ACC: [id number]     EXAM:  MR ANGIO BRAIN  PROCEDURE DATE:  05/10/2022    INTERPRETATION:  Clinical History / Reason for exam: 62-year-old male. History of recurrent transient ischemic attacks. Basilar artery stenosis. Referred for imaging  TECHNIQUE: MRI brain: Sagittal T1 and axial T1, FLAIR, T2 and gradient echo sequencing.  MRA BRAIN: 3-D TOF; MIP reformatted images in multiple planes  MRA NECK: 2-D and 3-D time-of-flight; MIP reformatted images  NOVA intravascular flow quantification: 2-D phase contrast MR images perpendicular to the vessel axis, flow and mL/min  COMPARISON STUDY: CTA brain and neck December 6, 2020  FINDINGS:  The ventricles and sulci are appropriate for the age.  Extensive patchy areas of T2 signal change are noted within the periventricular and subcortical white matter. Nonspecific but suggests moderate severe microvascular ischemia for patient of this age.  There is no evidence for restricted diffusion to suggest recent infarction. There is no evidence for hemorrhage.  There is opacification with T2 signal changes in the frontal sinuses, ethmoid air cells and left maxillary sinus.  There is normal flow-void demonstrated in the major vascular structures.  MRA BRAIN: The right left internal carotid arteries are within normal signal and caliber within the petrous, cavernous and supraclinoid portions. The anterior and middle cerebral arteries are unremarkable.  Vertebral basilar: The right vertebral artery is diminutive and may terminate in a PICA. The left vertebral artery is patent. The basilar artery demonstrates moderate stenosis proximal to the anterior inferior cerebellar arteries. The posterior cerebral arteries are patent. There is prominent supply from the anterior circulation via the posterior communicating artery to the posterior cerebral artery. The common carotid arteries and bifurcations are normally patent in the neck.  NOVA:  Right side:  CCA: 475 PATY A1: 88, A2: 70 MCA: 97 P-comm: 45  Left side:  CCA: 472  ICA: 197  PATY A1 51, A2: 87  MCA: 156  Vertebral basilar:  RVA: 40 LVA: 95 Basilar artery: 92 Right PCA: 45 Left PCA: 60  IMPRESSION:  1.  Moderate basilar artery stenosis  2.  NOVA flow quantification (mL/min) indicates the basilar artery stenosis is not flow-limiting. There is satisfactory flow to the posterior cerebral arteries contributed to by a prominent right P-comm (not fetal origin)  3.  Right vertebral artery terminates in a PICA  4.  Normal anterior circulation flow. Slight variation in flow to the right MCA probably due to the prominent P-comm giving supply to the posterior circulation  5.  Patent carotid bifurcations  6.  Moderate to severe microvascular ischemic change in the brain h

## 2024-05-14 NOTE — HISTORY OF PRESENT ILLNESS
[Tinnitus] : tinnitus [de-identified] : Patient presents today for c/o tinnitus.  He states he has ringing in both ears that never goes away. It has been getting louder randomly.  Sound has become pulsatile in the right ear.  Symptoms have been ongoing for 6 months to a year.   Pt ahs hx of basilar artery stenosis.  No further complaints.  [Ear Fullness] : no ear fullness [Vertigo] : no vertigo

## 2024-05-24 ENCOUNTER — APPOINTMENT (OUTPATIENT)
Dept: NEUROLOGY | Facility: CLINIC | Age: 64
End: 2024-05-24
Payer: MEDICARE

## 2024-05-24 VITALS
WEIGHT: 276 LBS | BODY MASS INDEX: 36.58 KG/M2 | HEART RATE: 91 BPM | HEIGHT: 73 IN | SYSTOLIC BLOOD PRESSURE: 102 MMHG | DIASTOLIC BLOOD PRESSURE: 71 MMHG

## 2024-05-24 DIAGNOSIS — I63.9 CEREBRAL INFARCTION, UNSPECIFIED: ICD-10-CM

## 2024-05-24 DIAGNOSIS — I65.1 OCCLUSION AND STENOSIS OF BASILAR ARTERY: ICD-10-CM

## 2024-05-24 PROCEDURE — G2211 COMPLEX E/M VISIT ADD ON: CPT

## 2024-05-24 PROCEDURE — 99214 OFFICE O/P EST MOD 30 MIN: CPT

## 2024-05-24 RX ORDER — METFORMIN HYDROCHLORIDE 1000 MG/1
1000 TABLET, COATED ORAL
Refills: 0 | Status: DISCONTINUED | COMMUNITY
Start: 2022-07-23 | End: 2024-05-24

## 2024-05-24 RX ORDER — METFORMIN HYDROCHLORIDE 1000 MG/1
1000 TABLET, COATED ORAL DAILY
Refills: 0 | Status: ACTIVE | COMMUNITY

## 2024-05-24 RX ORDER — BLOOD-GLUCOSE SENSOR
EACH MISCELLANEOUS
Qty: 2 | Refills: 0 | Status: DISCONTINUED | COMMUNITY
Start: 2023-02-27 | End: 2024-05-24

## 2024-05-24 RX ORDER — DILTIAZEM HYDROCHLORIDE 360 MG/1
360 CAPSULE, EXTENDED RELEASE ORAL DAILY
Refills: 0 | Status: DISCONTINUED | COMMUNITY
End: 2024-05-24

## 2024-05-24 RX ORDER — TIRZEPATIDE 7.5 MG/.5ML
7.5 INJECTION, SOLUTION SUBCUTANEOUS
Qty: 1 | Refills: 3 | Status: DISCONTINUED | COMMUNITY
Start: 2023-02-27 | End: 2024-05-24

## 2024-05-24 RX ORDER — TIRZEPATIDE 12.5 MG/.5ML
12.5 INJECTION, SOLUTION SUBCUTANEOUS
Qty: 3 | Refills: 1 | Status: DISCONTINUED | COMMUNITY
Start: 2023-10-03 | End: 2024-05-24

## 2024-05-28 NOTE — HISTORY OF PRESENT ILLNESS
[FreeTextEntry1] : Mr. BOYKIN 63 year old male presents today as a follow up on stroke and basilar artery stenosis. H/O left pontine infarct in 2018, was on ASA and Brilinta from cardiac stand point for 3 eyars, 2022 switched to Plavix. PRU: 87  He reports whooshing sound in  right ear for past few months, saw ENT found to have age related hearing loss, but also was recommended to follow up with neurology.  He denies any headache, loss of balance, double vision or weakness/paresthesia. Has been compliant with medication. still struggling somewhat with DM control. His PCP and endocrinologist managing that

## 2024-05-28 NOTE — ASSESSMENT
[FreeTextEntry1] : Mr Perez is here for follow up for basilar artery stenosis and h/o stroke. Recent pulsatile stenosis right ear is concerning for vessel pathology,Exam: stable.   Plan: - Obtain CT angio head with contrast - Neuroendovascular consult. - Continue Plavix 75 mg daily. - Lipid and DM management by PCP.

## 2024-06-12 ENCOUNTER — APPOINTMENT (OUTPATIENT)
Dept: ENDOCRINOLOGY | Facility: CLINIC | Age: 64
End: 2024-06-12
Payer: MEDICARE

## 2024-06-12 VITALS
OXYGEN SATURATION: 98 % | BODY MASS INDEX: 36.45 KG/M2 | HEART RATE: 94 BPM | DIASTOLIC BLOOD PRESSURE: 70 MMHG | SYSTOLIC BLOOD PRESSURE: 110 MMHG | WEIGHT: 275 LBS | HEIGHT: 73 IN

## 2024-06-12 DIAGNOSIS — E11.65 TYPE 2 DIABETES MELLITUS WITH HYPERGLYCEMIA: ICD-10-CM

## 2024-06-12 DIAGNOSIS — E89.0 POSTPROCEDURAL HYPOTHYROIDISM: ICD-10-CM

## 2024-06-12 DIAGNOSIS — E78.1 PURE HYPERGLYCERIDEMIA: ICD-10-CM

## 2024-06-12 DIAGNOSIS — E66.9 OBESITY, UNSPECIFIED: ICD-10-CM

## 2024-06-12 PROCEDURE — 99214 OFFICE O/P EST MOD 30 MIN: CPT

## 2024-06-12 RX ORDER — BLOOD-GLUCOSE SENSOR
EACH MISCELLANEOUS
Qty: 6 | Refills: 3 | Status: ACTIVE | COMMUNITY
Start: 2023-06-05 | End: 1900-01-01

## 2024-06-12 RX ORDER — GLIMEPIRIDE 2 MG/1
2 TABLET ORAL
Qty: 60 | Refills: 0 | Status: ACTIVE | COMMUNITY
Start: 2024-06-12 | End: 1900-01-01

## 2024-06-12 NOTE — REVIEW OF SYSTEMS
[As Noted in HPI] : as noted in HPI [Fatigue] : no fatigue [Recent Weight Gain (___ Lbs)] : no recent weight gain [Recent Weight Loss (___ Lbs)] : recent weight loss: [unfilled] lbs [Visual Field Defect] : no visual field defect [Blurred Vision] : blurred vision [Dysphagia] : no dysphagia [Neck Pain] : no neck pain [Dysphonia] : no dysphonia [Chest Pain] : no chest pain [Palpitations] : no palpitations [Lower Ext Edema] : no lower extremity edema [Shortness Of Breath] : no shortness of breath [SOB on Exertion] : no shortness of breath on exertion [Nausea] : no nausea [Constipation] : no constipation [Vomiting] : no vomiting [Diarrhea] : no diarrhea [FreeTextEntry4] : tinnitus  [de-identified] : tinnitus

## 2024-06-12 NOTE — DATA REVIEWED
[FreeTextEntry1] : 7/2022: A1c 8.5 %TSH 0.46 glucose 189  crea 1.57  GFR 50  Tg 232  LDL 70  12/2022:  A1c 7.2%  GFR 58  glucose 161 crea 1.4  LDL 57 TSH 0.43 6/2023: LDL 50 GFR 53  glucose 287 TSh 1.02 ft4 2.9  10/31/23:A1c 8.7%  glucose 158   GFR 53 crea 1.5  6/2024: A1c 12.1%  GFR 57  TSh 0.49 LDL ok Tg 190

## 2024-06-12 NOTE — PHYSICAL EXAM
[Alert] : alert [Healthy Appearance] : healthy appearance [Obese] : obese [No Acute Distress] : no acute distress [Thyroid Not Enlarged] : the thyroid was not enlarged [No Respiratory Distress] : no respiratory distress [No Accessory Muscle Use] : no accessory muscle use [Clear to Auscultation] : lungs were clear to auscultation bilaterally [Normal S1, S2] : normal S1 and S2 [No Murmurs] : no murmurs [Regular Rhythm] : with a regular rhythm [No Edema] : no peripheral edema [Soft] : abdomen soft [No CVA Tenderness] : no ~M costovertebral angle tenderness [No Stigmata of Cushings Syndrome] : no stigmata of Cushings Syndrome [Right foot was examined, including] : right foot ~C was examined, including visual inspection with sensory and pulse exams [Left foot was examined, including] : left foot ~C was examined, including visual inspection with sensory and pulse exams [2+] : 2+ in the dorsalis pedis [No Tremors] : no tremors [Oriented x3] : oriented to person, place, and time [Abdominal Striae] : no abdominal striae [Acanthosis Nigricans] : no acanthosis nigricans [de-identified] : has AAA

## 2024-06-12 NOTE — ASSESSMENT
[Diabetes Foot Care] : diabetes foot care [Long Term Vascular Complications] : long term vascular complications of diabetes [Carbohydrate Consistent Diet] : carbohydrate consistent diet [Exercise/Effect on Glucose] : exercise/effect on glucose [Hypoglycemia Management] : hypoglycemia management [Self Monitoring of Blood Glucose] : self monitoring of blood glucose [Retinopathy Screening] : Patient was referred to ophthalmology for retinopathy screening [Weight Loss] : weight loss [FreeTextEntry1] : Mr. JORI BOYKIN Is a 64 year old male with CAD s/p stent, stroke and basilar artery stenosis ,AAA now s/p surgery ,  post ablative hypothyroidism for Grave's disease who presented for follow up evaluation of type 2 Diabetes:    # type 2 DM / DL/ Obesity - A1c 12.1% missed doses on mounjaro and also skipped glimepiride  - restart Mounjaro 15 mg Q week  - restart glimpeirde 2 mg BID ,  - still refuse insulin  - continue metformin 1000 mg daily ( GFR 57 )  -will highly benefir from CGm , has yenni 3 sample will use and call in 2-3 weeks if remain uncontrolled  - continue Statin and fish oil, ldl ok  - saw ophthalmology  - will benefit from CGM for Bg monitoring     # post ablative hypothyroidism - tft's ok on lt4 175 mcg daily - continue same no hypo/hyperthyroid symptoms check labs  # high alk phos

## 2024-06-14 RX ORDER — TIRZEPATIDE 15 MG/.5ML
15 INJECTION, SOLUTION SUBCUTANEOUS
Qty: 1 | Refills: 0 | Status: ACTIVE | COMMUNITY
Start: 2024-06-12 | End: 1900-01-01

## 2024-07-23 ENCOUNTER — RESULT REVIEW (OUTPATIENT)
Age: 64
End: 2024-07-23

## 2024-07-25 ENCOUNTER — APPOINTMENT (OUTPATIENT)
Dept: CARDIOLOGY | Facility: CLINIC | Age: 64
End: 2024-07-25
Payer: MEDICARE

## 2024-07-25 VITALS
HEART RATE: 90 BPM | SYSTOLIC BLOOD PRESSURE: 120 MMHG | DIASTOLIC BLOOD PRESSURE: 84 MMHG | WEIGHT: 273 LBS | OXYGEN SATURATION: 97 % | BODY MASS INDEX: 36.18 KG/M2 | HEIGHT: 73 IN

## 2024-07-25 DIAGNOSIS — E78.1 PURE HYPERGLYCERIDEMIA: ICD-10-CM

## 2024-07-25 DIAGNOSIS — I25.10 ATHEROSCLEROTIC HEART DISEASE OF NATIVE CORONARY ARTERY W/OUT ANGINA PECTORIS: ICD-10-CM

## 2024-07-25 DIAGNOSIS — E78.5 HYPERLIPIDEMIA, UNSPECIFIED: ICD-10-CM

## 2024-07-25 DIAGNOSIS — I10 ESSENTIAL (PRIMARY) HYPERTENSION: ICD-10-CM

## 2024-07-25 DIAGNOSIS — E11.65 TYPE 2 DIABETES MELLITUS WITH HYPERGLYCEMIA: ICD-10-CM

## 2024-07-25 PROCEDURE — 93000 ELECTROCARDIOGRAM COMPLETE: CPT

## 2024-07-25 PROCEDURE — 99214 OFFICE O/P EST MOD 30 MIN: CPT

## 2024-07-25 RX ORDER — TIRZEPATIDE 12.5 MG/.5ML
12.5 INJECTION, SOLUTION SUBCUTANEOUS WEEKLY
Refills: 0 | Status: ACTIVE | COMMUNITY

## 2024-07-25 RX ORDER — DILTIAZEM HYDROCHLORIDE 360 MG/1
360 TABLET, EXTENDED RELEASE ORAL DAILY
Refills: 0 | Status: ACTIVE | COMMUNITY

## 2024-07-25 NOTE — CARDIOLOGY SUMMARY
[de-identified] : EKG 8/29/22 Normal sinus rhythm 94 bpm EKG 5/9/22 Normal sinus rhythm 87 bpm EKG 6/15/23 Normal sinus rhythm 93 bpm  EKG 12/14/23 NSR 85 bpm  EKG 7/25/2024 Normal sinus rhythm 90 bpm  [de-identified] : TTE 7/12/23 normal LV sys function, EF 55%, normal RV size/function, no HD significant valve disease ascending aorta 3.7 cm [de-identified] : Pharmacologic nuclear stress test 7/5/22 negative, no perfusion defects [de-identified] : 09/28/22- PVR R  anterior Tibial artery disease  L distal sup Femoral artery and /or popiteal and L ant tibial arterial disease.   Blood \par  LE ART US 9/28/22 mild to moder diffuse athero b/l, no pop art aneurysm\par  11/23/22 US Abdominal Aorta AAA 4.5 cm

## 2024-07-25 NOTE — HISTORY OF PRESENT ILLNESS
[FreeTextEntry1] : 64 M with CAD s/p RCA PCI, AAA s/p endovascular repair , HTN, T2DM, CVA, DLD, basilar artery stenosis here for follow-up of CAD, HTN, DLD.   24: Presents for f/u  No chest pain, shortness of breath, palpitations, lightheadedness/dizziness, pre-syncope/syncope, or lower extremity edema.  23 Pt is seen for f.u.  Pt will have R hip biopsy 23 for a bone lesion.   Pt denies chest pain, no SOB, no palpitations, no dizziness.  Followed by Dr Muro for S/p AAA endovascular repair.  Pt is active all day.   6/15/23 Pt is seen for f/u.  Denies SOB, no chest pain, no dizziness, no BLEs edema.  Pt tolerates Plavix well, no s.s of bleeding.  Pt c.o knee pain with walking.  Ozempic was changed to Mounjaro inj.  Pt did not start exercise program.  Can walk >4 blocks and climb> 2 flights of stairs with no CP or SOB.   CTA next week for AAA  22 Pt is seen for f.u.  Denies SOB, no chest pain, no dizziness, no edema.  Brilinta/ ASA was D/brisa by Dr Esqueda pt was started on plavix tolerating it well.  Pt is mostly sedentary  22- PVR R  anterior Tibial artery disease  L distal sup Femoral artery and /or popiteal and L ant tibial arterial disease.   Blood work done today in am  22  Abdominal Aorta AAA 4.5 cm   22 No further episodes of chest pain. No shortness of breath, palpitations, lightheadedness/dizziness, pre-syncope/syncope, or lower extremity edema.   Rhinorrhea 3-4 times per day. Sneezing. No congestion.   Continues to struggle with eating. Starting to make lifestyle changes.   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  22 Working to lose weight. Lost 17 lbs. Reading labels. Avoiding added sugar. Weight loss has slowed because of recent sciatica flare.   Had recent chest pain episode after touring [facility] with his daughter. They had walked most of the day and at the end of the day they were going to the bookstore when we had chest pain and diaphoresis. No associated SOB, dizziness. Scared him. Improved after 7-8 minutes of rest. No recurrence.   22  No chest pain, SOB, palpitations, LE edema.   AAA incidentally found 20 years ago. Previously following with Dr. Raygoza.   Joined [employer] last month. Trying to lose weight. Resistance training (>150 lbs) yesterday.   Quit smoking   No known family hxo aortic aneurysm, however grandfather  suddenly at 63 yo. Diagnosis unknown  Single dad to 16 yo and 15 yo  Increased stress raising kids by himself. States he is high functioning, but depressed.

## 2024-07-25 NOTE — ASSESSMENT
[FreeTextEntry1] : Assessment: #CAD s/p RCA PCI 12/2018 - asymptomatic  #AAA s/p EVAR 8/6/23  #Basilar artery stenosis and PCA stenosis - Medical management recommended #Hxo carotid stenosis #HTN - Goal SBP<110 with AAA, however his goal is SBP 120s given BA and PCA stenosis #DLD - LDL at goal <70, TG not at goal #T2DM, uncontrolled #PAD  07/14/23 TTE EF 55% Fibrocalcific AV Mildly dilatated prox ascending Aorta 3.7 cm   6/24 GFR 57 LDL 62,  A1C 12.4% TSH 0.43  Plan: - Cont Plavix - Cont Vascepa for hypertriglyceridemia - Cont atorvastatin 80 mg qHS - Continue ramipril 10 mg BID and diltiazem 360 mg daily - Encouraged plant-based and Mediterranean diets, along with increased fruit, nut, vegetable, legume, and lean vegetable or animal protein (preferably fish) consumption - Engage in at least 150 minutes per week of accumulated moderate-intensity aerobic physical activity or 75 minutes per week of vigorous-intensity aerobic physical activity - Labs planned with endo before next visit - RTC 6 months  I, Dr. Corrigan, personally performed the evaluation and management (E/M) services for this established patient who presents today with (a) new problem(s)/exacerbation of (an) existing condition(s). That E/M includes conducting the clinically appropriate interval history &/or exam, assessing all new/exacerbated conditions, and establishing a new plan of care. Today, IFEANYI Mckeon was here to observe &/or participate in the visit & follow plan of care established by me.

## 2024-08-01 ENCOUNTER — NON-APPOINTMENT (OUTPATIENT)
Age: 64
End: 2024-08-01

## 2024-08-01 ENCOUNTER — APPOINTMENT (OUTPATIENT)
Dept: NEUROLOGY | Facility: CLINIC | Age: 64
End: 2024-08-01

## 2024-08-02 ENCOUNTER — APPOINTMENT (OUTPATIENT)
Dept: NEUROLOGY | Facility: CLINIC | Age: 64
End: 2024-08-02

## 2024-08-02 ENCOUNTER — NON-APPOINTMENT (OUTPATIENT)
Age: 64
End: 2024-08-02

## 2024-08-02 VITALS
RESPIRATION RATE: 18 BRPM | WEIGHT: 276 LBS | OXYGEN SATURATION: 99 % | SYSTOLIC BLOOD PRESSURE: 132 MMHG | BODY MASS INDEX: 36.58 KG/M2 | DIASTOLIC BLOOD PRESSURE: 80 MMHG | HEIGHT: 73 IN | HEART RATE: 88 BPM

## 2024-08-02 DIAGNOSIS — I65.1 OCCLUSION AND STENOSIS OF BASILAR ARTERY: ICD-10-CM

## 2024-08-02 PROCEDURE — G2211 COMPLEX E/M VISIT ADD ON: CPT

## 2024-08-02 PROCEDURE — 99215 OFFICE O/P EST HI 40 MIN: CPT

## 2024-08-03 NOTE — PHYSICAL EXAM
[FreeTextEntry1] : Alert & Attentive, Oriented to person, place, time, and situation CN II-XII normal EOMi, Visual fields full No aphasia or dysarthria Strength 5/5 throughout Normal sensation Normal bulk/tone Narrow-based gait.

## 2024-08-03 NOTE — DATA REVIEWED
[de-identified] : Reviewed MRA Head/Neck from 5/10/22 which showed moderate basilar artery stenosis. NOVA flow quantification indicates the basilar artery stenosis is not flow-limiting. Reviewed CTA head from 7/23/23 which showed short-segment moderate stenosis of the mid-basilar artery

## 2024-08-03 NOTE — ASSESSMENT
[FreeTextEntry1] : Mr. Perez is a 64-year-old gentleman with stable basilar artery stenosis. Will conservatively manage/monitor as patient has no current complaints or deficits on exam related to stenosis.     -Repeat CTA H/N in 1 year to assess degree of stenosis -Patient advised to call back or RTO if any concerning symptoms -RTC post-follow-up of CTA head/neck.

## 2024-08-03 NOTE — HISTORY OF PRESENT ILLNESS
[FreeTextEntry1] : Mr. Perez is a 64-year-old gentleman with PMH of CAD s/p stent, DM2, HTN, HLD, AAA, RA, Hypothyroidism, former smoker, left pontine infarct (2018), and age-related hearing loss, who was referred by Dr. Esqueda for further evaluation/management of stable basilar artery stenosis on imaging. Previously followed with Dr. Munguia. Reports recent bilateral vision changes in which he saw ophthalmologist and was related to his uncontrolled diabetes. States diabetes is now better control and his vision has improved. Denies dizziness, headache, weakness, numbness, or balance issues.

## 2024-08-15 ENCOUNTER — RX RENEWAL (OUTPATIENT)
Age: 64
End: 2024-08-15

## 2024-08-27 NOTE — REASON FOR VISIT
Pt arrives with c/o right/upper abd pain x tonight. Pt states has h/o gallstones and is suppose to have gallbladder removed. +nausea, - vomiting.    [Follow-Up: _____] : a [unfilled] follow-up visit

## 2024-08-30 PROBLEM — I67.2 INTRACRANIAL ATHEROSCLEROSIS: Status: ACTIVE | Noted: 2024-08-30

## 2024-08-30 PROBLEM — I66.23 OCCLUSION AND STENOSIS OF BILATERAL POSTERIOR CEREBRAL ARTERIES: Status: ACTIVE | Noted: 2024-08-30

## 2024-09-10 ENCOUNTER — APPOINTMENT (OUTPATIENT)
Dept: ENDOCRINOLOGY | Facility: CLINIC | Age: 64
End: 2024-09-10
Payer: MEDICARE

## 2024-09-10 VITALS
DIASTOLIC BLOOD PRESSURE: 70 MMHG | WEIGHT: 273 LBS | HEIGHT: 73 IN | OXYGEN SATURATION: 97 % | BODY MASS INDEX: 36.18 KG/M2 | SYSTOLIC BLOOD PRESSURE: 122 MMHG | HEART RATE: 76 BPM

## 2024-09-10 DIAGNOSIS — E78.1 PURE HYPERGLYCERIDEMIA: ICD-10-CM

## 2024-09-10 DIAGNOSIS — E89.0 POSTPROCEDURAL HYPOTHYROIDISM: ICD-10-CM

## 2024-09-10 DIAGNOSIS — E66.9 OBESITY, UNSPECIFIED: ICD-10-CM

## 2024-09-10 DIAGNOSIS — E11.65 TYPE 2 DIABETES MELLITUS WITH HYPERGLYCEMIA: ICD-10-CM

## 2024-09-10 DIAGNOSIS — E78.5 HYPERLIPIDEMIA, UNSPECIFIED: ICD-10-CM

## 2024-09-10 PROCEDURE — G2211 COMPLEX E/M VISIT ADD ON: CPT

## 2024-09-10 PROCEDURE — 99214 OFFICE O/P EST MOD 30 MIN: CPT

## 2024-09-10 NOTE — HISTORY OF PRESENT ILLNESS
[FreeTextEntry1] : Mr. JORI BOYKIN  Is  a 64 year  old male  with CAD s/p stent, stroke and  basilar artery stenosis , post ablative hypothyroidism for Grave's disease, AAA s/p surgery (8/2023)   who presented for follow up  evaluation of type 2 Diabetes:   Diagnosis: around 6 years ago  Current Regimen: Mounjaro 10mg /12.5  mg Q week ,  metformin 1000 mg OD, glimepiride 2mg 1/2 tablet in am and 1/2 tablet in pm  Previous regimens: Ozempic  Compliance: ok  SMBG/CGM :  using CGM GMI 6.4%   Hypoglycemia: no  Polyuria/polydipsia : no  Weight change/BMI: lost with mounjaro  Diet: doesn't watch diet , carb heavy  Exercise: no  HBa1c trend:   8.5%( 11/2022) ...7.2%( 12/2022).. 6/2023 A1c 8.7% ....6/2024: A1c 12.1% ....9/2024 6.9%    prevention   Statin: Lipitor 80 mg daily , fish oil 2 g BID  ACE/ARB :  ramipril 10 mg BID  Eye examination: 5/2023  Neuropathy: has back problems too , no pins and needles       # post ablative  hypothyroidism - at the age of 16 had Grave's disease and hyperthyroidism treated with meds - at the age of 23 was able to get NAJERA and since that time hypothyroid - now on Lt4 175 mcg daily , good pill technique  , clinically euthyroid

## 2024-09-10 NOTE — PHYSICAL EXAM
[Alert] : alert [Healthy Appearance] : healthy appearance [Obese] : obese [No Acute Distress] : no acute distress [Thyroid Not Enlarged] : the thyroid was not enlarged [No Respiratory Distress] : no respiratory distress [No Accessory Muscle Use] : no accessory muscle use [Clear to Auscultation] : lungs were clear to auscultation bilaterally [Normal S1, S2] : normal S1 and S2 [No Murmurs] : no murmurs [Regular Rhythm] : with a regular rhythm [No Edema] : no peripheral edema [Soft] : abdomen soft [No CVA Tenderness] : no ~M costovertebral angle tenderness [No Stigmata of Cushings Syndrome] : no stigmata of Cushings Syndrome [Right foot was examined, including] : right foot ~C was examined, including visual inspection with sensory and pulse exams [Left foot was examined, including] : left foot ~C was examined, including visual inspection with sensory and pulse exams [2+] : 2+ in the dorsalis pedis [No Tremors] : no tremors [Oriented x3] : oriented to person, place, and time [Abdominal Striae] : no abdominal striae [Acanthosis Nigricans] : no acanthosis nigricans [de-identified] : has AAA

## 2024-09-10 NOTE — REVIEW OF SYSTEMS
[Recent Weight Loss (___ Lbs)] : recent weight loss: [unfilled] lbs [Blurred Vision] : blurred vision [As Noted in HPI] : as noted in HPI [Fatigue] : no fatigue [Recent Weight Gain (___ Lbs)] : no recent weight gain [Visual Field Defect] : no visual field defect [Dysphagia] : no dysphagia [Neck Pain] : no neck pain [Dysphonia] : no dysphonia [Chest Pain] : no chest pain [Palpitations] : no palpitations [Lower Ext Edema] : no lower extremity edema [Shortness Of Breath] : no shortness of breath [SOB on Exertion] : no shortness of breath on exertion [Nausea] : no nausea [Constipation] : no constipation [Vomiting] : no vomiting [Diarrhea] : no diarrhea [FreeTextEntry4] : tinnitus  [de-identified] : tinnitus

## 2024-09-10 NOTE — ASSESSMENT
[Diabetes Foot Care] : diabetes foot care [Long Term Vascular Complications] : long term vascular complications of diabetes [Carbohydrate Consistent Diet] : carbohydrate consistent diet [Exercise/Effect on Glucose] : exercise/effect on glucose [Hypoglycemia Management] : hypoglycemia management [Self Monitoring of Blood Glucose] : self monitoring of blood glucose [Retinopathy Screening] : Patient was referred to ophthalmology for retinopathy screening [Weight Loss] : weight loss [FreeTextEntry1] : Mr. JORI BOYKIN Is a 64 year old male with CAD s/p stent, stroke and basilar artery stenosis ,AAA now s/p surgery ,  post ablative hypothyroidism for Grave's disease who presented for follow up evaluation of type 2 Diabetes:    # type 2 DM / DL/ Obesity - A1c down to 6.9% ( 9/2024) , now back on Mounjaro 12.5 mg Q week , metformin 1000 mg daily , and glimpeiride 1 mg BID  - continue Mounjaro 12.5 mg Q week  -will try to lower glimepiride to 1 mg in am only  - continue metformin 1000 mg daily  -will highly benefit from CGm, has yenni 3 helping a lot to control BG  - continue Statin and fish oil, ldl ok  - saw ophthalmology , due now      # post ablative hypothyroidism - tft's ok on lt4 175 mcg daily - continue same no hypo/hyperthyroid symptoms

## 2024-10-25 ENCOUNTER — APPOINTMENT (OUTPATIENT)
Dept: VASCULAR SURGERY | Facility: CLINIC | Age: 64
End: 2024-10-25

## 2024-10-30 ENCOUNTER — RX RENEWAL (OUTPATIENT)
Age: 64
End: 2024-10-30

## 2024-10-30 RX ORDER — TIRZEPATIDE 12.5 MG/.5ML
12.5 INJECTION, SOLUTION SUBCUTANEOUS
Qty: 1 | Refills: 3 | Status: ACTIVE | COMMUNITY
Start: 2024-10-30 | End: 1900-01-01

## 2024-11-08 ENCOUNTER — APPOINTMENT (OUTPATIENT)
Dept: OPHTHALMOLOGY | Facility: CLINIC | Age: 64
End: 2024-11-08

## 2024-11-19 NOTE — HISTORY OF PRESENT ILLNESS
[FreeTextEntry1] : Mr. JORI BOYKIN  Is  a 64 year  old male  with CAD s/p stent, stroke and  basilar artery stenosis , post ablative hypothyroidism for Grave's disease, AAA s/p surgery (8/2023)   who presented for follow up  evaluation of type 2 Diabetes:   Diagnosis: around 6 years ago  Current Regimen: Mounjaro 12.5 mg Q week ,  metformin 1000 mg OD, glimepiride 2 mg OD , he was on ozempic 2 mg for few weeks as mounjaro was on back order and also missed 3-4 weeks from back order also was not using glimepiride  Previous regimens: Ozempic  Compliance: ok  SMBG/CGM :  not checking  Hypoglycemia: no  Polyuria/polydipsia : no  Weight change/BMI: lost with mounjaro  Diet: doesn't watch diet , carb heavy  Exercise: no  HBa1c trend:   8.5%( 11/2022) ...7.2%( 12/2022).. 6/2023 A1c 8.7% ....6/2024: A1c 12.1%    prevention   Statin: Lipitor 80 mg daily , fish oil 2 g BID  ACE/ARB :  ramipril 10 mg BID  Eye examination: 5/2023  Neuropathy: has back problems too , no pins and needles       # post ablative  hypothyroidism - at the age of 16 had Grave's disease and hyperthyroidism treated with meds - at the age of 23 was able to get NAJERA and since that time hypothyroid - now on Lt4 175 mcg daily , good pill technique  , clinically euthyroid 
Inadequate Oral Intake

## 2024-12-02 ENCOUNTER — RX RENEWAL (OUTPATIENT)
Age: 64
End: 2024-12-02

## 2024-12-06 ENCOUNTER — APPOINTMENT (OUTPATIENT)
Dept: VASCULAR SURGERY | Facility: CLINIC | Age: 64
End: 2024-12-06
Payer: MEDICARE

## 2024-12-06 VITALS — DIASTOLIC BLOOD PRESSURE: 95 MMHG | SYSTOLIC BLOOD PRESSURE: 147 MMHG | HEART RATE: 87 BPM

## 2024-12-06 VITALS — BODY MASS INDEX: 35.25 KG/M2 | WEIGHT: 266 LBS | HEIGHT: 73 IN

## 2024-12-06 DIAGNOSIS — I71.40 ABDOMINAL AORTIC ANEURYSM, WITHOUT RUPTURE, UNSPECIFIED: ICD-10-CM

## 2024-12-06 PROCEDURE — 99213 OFFICE O/P EST LOW 20 MIN: CPT

## 2024-12-06 PROCEDURE — 93978 VASCULAR STUDY: CPT

## 2024-12-14 NOTE — DATA REVIEWED
[facility] [FreeTextEntry1] : 7/2022: A1c 8.5 %TSH 0.46 glucose 189  crea 1.57  GFR 50  Tg 232  LDL 70  12/2022:  A1c 7.2%  GFR 58  glucose 161 crea 1.4  LDL 57 TSH 0.43 6/2023: LDL 50 GFR 53  glucose 287 TSh 1.02 ft4 2.9  10/31/23:A1c 8.7%  glucose 158   GFR 53 crea 1.5  6/2024: A1c 12.1%  GFR 57  TSh 0.49 LDL ok Tg 190  9/2024: A1c 6.9% TSH 0.85   Ft4 2.2 glucose 127  crea 1.3  GFR 62  alk phos 124  LDL 89   ACR 42

## 2025-01-08 ENCOUNTER — RX RENEWAL (OUTPATIENT)
Age: 65
End: 2025-01-08

## 2025-01-23 ENCOUNTER — APPOINTMENT (OUTPATIENT)
Dept: CARDIOLOGY | Facility: CLINIC | Age: 65
End: 2025-01-23
Payer: MEDICARE

## 2025-01-23 ENCOUNTER — NON-APPOINTMENT (OUTPATIENT)
Age: 65
End: 2025-01-23

## 2025-01-23 VITALS
BODY MASS INDEX: 34.85 KG/M2 | HEART RATE: 72 BPM | SYSTOLIC BLOOD PRESSURE: 120 MMHG | WEIGHT: 263 LBS | DIASTOLIC BLOOD PRESSURE: 80 MMHG | HEIGHT: 73 IN

## 2025-01-23 DIAGNOSIS — E78.5 HYPERLIPIDEMIA, UNSPECIFIED: ICD-10-CM

## 2025-01-23 DIAGNOSIS — E11.9 TYPE 2 DIABETES MELLITUS W/OUT COMPLICATIONS: ICD-10-CM

## 2025-01-23 DIAGNOSIS — I10 ESSENTIAL (PRIMARY) HYPERTENSION: ICD-10-CM

## 2025-01-23 DIAGNOSIS — I25.10 ATHEROSCLEROTIC HEART DISEASE OF NATIVE CORONARY ARTERY W/OUT ANGINA PECTORIS: ICD-10-CM

## 2025-01-23 PROCEDURE — 99214 OFFICE O/P EST MOD 30 MIN: CPT

## 2025-01-23 PROCEDURE — 93000 ELECTROCARDIOGRAM COMPLETE: CPT

## 2025-01-23 PROCEDURE — G2211 COMPLEX E/M VISIT ADD ON: CPT

## 2025-03-11 ENCOUNTER — APPOINTMENT (OUTPATIENT)
Dept: ENDOCRINOLOGY | Facility: CLINIC | Age: 65
End: 2025-03-11

## 2025-03-20 ENCOUNTER — RX RENEWAL (OUTPATIENT)
Age: 65
End: 2025-03-20

## 2025-05-29 ENCOUNTER — RX RENEWAL (OUTPATIENT)
Age: 65
End: 2025-05-29

## 2025-07-24 ENCOUNTER — APPOINTMENT (OUTPATIENT)
Dept: CARDIOLOGY | Facility: CLINIC | Age: 65
End: 2025-07-24
Payer: MEDICARE

## 2025-07-24 VITALS
SYSTOLIC BLOOD PRESSURE: 120 MMHG | WEIGHT: 258 LBS | BODY MASS INDEX: 34.19 KG/M2 | DIASTOLIC BLOOD PRESSURE: 80 MMHG | HEIGHT: 73 IN | HEART RATE: 83 BPM

## 2025-07-24 DIAGNOSIS — E11.9 TYPE 2 DIABETES MELLITUS W/OUT COMPLICATIONS: ICD-10-CM

## 2025-07-24 DIAGNOSIS — E78.5 HYPERLIPIDEMIA, UNSPECIFIED: ICD-10-CM

## 2025-07-24 DIAGNOSIS — I10 ESSENTIAL (PRIMARY) HYPERTENSION: ICD-10-CM

## 2025-07-24 PROCEDURE — G2211 COMPLEX E/M VISIT ADD ON: CPT

## 2025-07-24 PROCEDURE — 93000 ELECTROCARDIOGRAM COMPLETE: CPT

## 2025-07-24 PROCEDURE — 99214 OFFICE O/P EST MOD 30 MIN: CPT

## 2025-07-24 RX ORDER — EZETIMIBE 10 MG/1
10 TABLET ORAL
Qty: 90 | Refills: 3 | Status: ACTIVE | COMMUNITY
Start: 2025-07-24 | End: 1900-01-01

## 2025-07-27 ENCOUNTER — OUTPATIENT (OUTPATIENT)
Dept: OUTPATIENT SERVICES | Facility: HOSPITAL | Age: 65
LOS: 1 days | End: 2025-07-27
Payer: MEDICARE

## 2025-07-27 DIAGNOSIS — Z98.890 OTHER SPECIFIED POSTPROCEDURAL STATES: Chronic | ICD-10-CM

## 2025-07-27 DIAGNOSIS — Z95.5 PRESENCE OF CORONARY ANGIOPLASTY IMPLANT AND GRAFT: Chronic | ICD-10-CM

## 2025-07-27 DIAGNOSIS — Z90.49 ACQUIRED ABSENCE OF OTHER SPECIFIED PARTS OF DIGESTIVE TRACT: Chronic | ICD-10-CM

## 2025-07-27 DIAGNOSIS — Z95.828 PRESENCE OF OTHER VASCULAR IMPLANTS AND GRAFTS: Chronic | ICD-10-CM

## 2025-07-27 DIAGNOSIS — I65.1 OCCLUSION AND STENOSIS OF BASILAR ARTERY: ICD-10-CM

## 2025-07-27 PROCEDURE — 70496 CT ANGIOGRAPHY HEAD: CPT | Mod: 26

## 2025-07-27 PROCEDURE — 70498 CT ANGIOGRAPHY NECK: CPT | Mod: 26

## 2025-07-27 PROCEDURE — 70498 CT ANGIOGRAPHY NECK: CPT

## 2025-07-27 PROCEDURE — 70496 CT ANGIOGRAPHY HEAD: CPT

## 2025-07-28 DIAGNOSIS — I65.1 OCCLUSION AND STENOSIS OF BASILAR ARTERY: ICD-10-CM

## 2025-08-06 ENCOUNTER — APPOINTMENT (OUTPATIENT)
Dept: NEUROLOGY | Facility: CLINIC | Age: 65
End: 2025-08-06
Payer: MEDICARE

## 2025-08-06 VITALS
OXYGEN SATURATION: 96 % | HEIGHT: 74 IN | WEIGHT: 258 LBS | HEART RATE: 80 BPM | BODY MASS INDEX: 33.11 KG/M2 | SYSTOLIC BLOOD PRESSURE: 163 MMHG | RESPIRATION RATE: 16 BRPM | DIASTOLIC BLOOD PRESSURE: 106 MMHG

## 2025-08-06 VITALS — SYSTOLIC BLOOD PRESSURE: 132 MMHG | DIASTOLIC BLOOD PRESSURE: 86 MMHG

## 2025-08-06 DIAGNOSIS — I67.2 CEREBRAL ATHEROSCLEROSIS: ICD-10-CM

## 2025-08-06 DIAGNOSIS — I65.1 OCCLUSION AND STENOSIS OF BASILAR ARTERY: ICD-10-CM

## 2025-08-06 PROCEDURE — 99214 OFFICE O/P EST MOD 30 MIN: CPT

## 2025-08-06 PROCEDURE — G2211 COMPLEX E/M VISIT ADD ON: CPT

## 2025-08-07 ENCOUNTER — RX RENEWAL (OUTPATIENT)
Age: 65
End: 2025-08-07

## 2025-08-15 ENCOUNTER — APPOINTMENT (OUTPATIENT)
Dept: VASCULAR SURGERY | Facility: CLINIC | Age: 65
End: 2025-08-15

## 2025-08-15 VITALS
SYSTOLIC BLOOD PRESSURE: 131 MMHG | DIASTOLIC BLOOD PRESSURE: 78 MMHG | BODY MASS INDEX: 33.11 KG/M2 | HEIGHT: 74 IN | WEIGHT: 258 LBS

## 2025-08-15 DIAGNOSIS — I71.40 ABDOMINAL AORTIC ANEURYSM, WITHOUT RUPTURE, UNSPECIFIED: ICD-10-CM

## 2025-08-15 PROCEDURE — 93978 VASCULAR STUDY: CPT

## 2025-08-15 PROCEDURE — 99212 OFFICE O/P EST SF 10 MIN: CPT
